# Patient Record
Sex: FEMALE | Race: WHITE | NOT HISPANIC OR LATINO | Employment: OTHER
[De-identification: names, ages, dates, MRNs, and addresses within clinical notes are randomized per-mention and may not be internally consistent; named-entity substitution may affect disease eponyms.]

---

## 2017-05-26 ENCOUNTER — RECORDS - HEALTHEAST (OUTPATIENT)
Dept: ADMINISTRATIVE | Facility: OTHER | Age: 67
End: 2017-05-26

## 2017-05-30 ENCOUNTER — COMMUNICATION - HEALTHEAST (OUTPATIENT)
Dept: SCHEDULING | Facility: CLINIC | Age: 67
End: 2017-05-30

## 2017-05-30 ENCOUNTER — AMBULATORY - HEALTHEAST (OUTPATIENT)
Dept: CARDIOLOGY | Facility: CLINIC | Age: 67
End: 2017-05-30

## 2017-05-30 DIAGNOSIS — I48.91 A-FIB (H): ICD-10-CM

## 2017-05-31 ENCOUNTER — HOSPITAL ENCOUNTER (OUTPATIENT)
Dept: CARDIOLOGY | Facility: CLINIC | Age: 67
Discharge: HOME | End: 2017-05-31
Attending: INTERNAL MEDICINE

## 2017-05-31 DIAGNOSIS — I48.91 A-FIB (H): ICD-10-CM

## 2017-06-06 ENCOUNTER — COMMUNICATION - HEALTHEAST (OUTPATIENT)
Dept: CARDIOLOGY | Facility: CLINIC | Age: 67
End: 2017-06-06

## 2017-06-06 DIAGNOSIS — I48.91 ATRIAL FIBRILLATION WITH RVR (H): ICD-10-CM

## 2017-06-06 DIAGNOSIS — I48.91 ATRIAL FIBRILLATION (H): ICD-10-CM

## 2017-06-08 ENCOUNTER — RECORDS - HEALTHEAST (OUTPATIENT)
Dept: ADMINISTRATIVE | Facility: OTHER | Age: 67
End: 2017-06-08

## 2017-06-16 ENCOUNTER — RECORDS - HEALTHEAST (OUTPATIENT)
Dept: LAB | Facility: CLINIC | Age: 67
End: 2017-06-16

## 2017-06-16 LAB
CHOLEST SERPL-MCNC: 205 MG/DL
FASTING STATUS PATIENT QL REPORTED: ABNORMAL
HDLC SERPL-MCNC: 54 MG/DL
LDLC SERPL CALC-MCNC: 134 MG/DL
TRIGL SERPL-MCNC: 86 MG/DL

## 2017-06-19 ENCOUNTER — OFFICE VISIT - HEALTHEAST (OUTPATIENT)
Dept: CARDIOLOGY | Facility: CLINIC | Age: 67
End: 2017-06-19

## 2017-06-19 DIAGNOSIS — I26.99 OTHER ACUTE PULMONARY EMBOLISM WITHOUT ACUTE COR PULMONALE (H): ICD-10-CM

## 2017-06-19 DIAGNOSIS — I48.19 PERSISTENT ATRIAL FIBRILLATION (H): ICD-10-CM

## 2017-06-19 DIAGNOSIS — I10 ESSENTIAL HYPERTENSION WITH GOAL BLOOD PRESSURE LESS THAN 140/90: ICD-10-CM

## 2017-06-19 DIAGNOSIS — I82.441 ACUTE DVT OF RIGHT TIBIAL VEIN (H): ICD-10-CM

## 2017-06-19 ASSESSMENT — MIFFLIN-ST. JEOR: SCORE: 1115.96

## 2017-06-20 ENCOUNTER — AMBULATORY - HEALTHEAST (OUTPATIENT)
Dept: CARDIOLOGY | Facility: CLINIC | Age: 67
End: 2017-06-20

## 2017-06-22 ENCOUNTER — AMBULATORY - HEALTHEAST (OUTPATIENT)
Dept: CARDIOLOGY | Facility: CLINIC | Age: 67
End: 2017-06-22

## 2017-06-26 ENCOUNTER — ANESTHESIA - HEALTHEAST (OUTPATIENT)
Dept: CARDIOLOGY | Facility: CLINIC | Age: 67
End: 2017-06-26

## 2017-06-28 ENCOUNTER — AMBULATORY - HEALTHEAST (OUTPATIENT)
Dept: CARDIOLOGY | Facility: CLINIC | Age: 67
End: 2017-06-28

## 2017-06-28 DIAGNOSIS — Z79.899 LONG TERM USE OF DRUG: ICD-10-CM

## 2017-06-29 ENCOUNTER — AMBULATORY - HEALTHEAST (OUTPATIENT)
Dept: CARDIOLOGY | Facility: CLINIC | Age: 67
End: 2017-06-29

## 2017-07-07 ENCOUNTER — HOSPITAL ENCOUNTER (OUTPATIENT)
Dept: MAMMOGRAPHY | Facility: CLINIC | Age: 67
Discharge: HOME | End: 2017-07-07
Attending: FAMILY MEDICINE

## 2017-07-07 ENCOUNTER — AMBULATORY - HEALTHEAST (OUTPATIENT)
Dept: CARDIOLOGY | Facility: CLINIC | Age: 67
End: 2017-07-07

## 2017-07-07 DIAGNOSIS — Z12.31 VISIT FOR SCREENING MAMMOGRAM: ICD-10-CM

## 2017-07-10 ENCOUNTER — OFFICE VISIT - HEALTHEAST (OUTPATIENT)
Dept: CARDIOLOGY | Facility: CLINIC | Age: 67
End: 2017-07-10

## 2017-07-10 ENCOUNTER — AMBULATORY - HEALTHEAST (OUTPATIENT)
Dept: CARDIOLOGY | Facility: CLINIC | Age: 67
End: 2017-07-10

## 2017-07-10 DIAGNOSIS — I48.19 PERSISTENT ATRIAL FIBRILLATION (H): ICD-10-CM

## 2017-07-10 DIAGNOSIS — I10 ESSENTIAL HYPERTENSION WITH GOAL BLOOD PRESSURE LESS THAN 140/90: ICD-10-CM

## 2017-07-10 ASSESSMENT — MIFFLIN-ST. JEOR: SCORE: 1093.28

## 2017-07-12 ENCOUNTER — AMBULATORY - HEALTHEAST (OUTPATIENT)
Dept: CARDIOLOGY | Facility: CLINIC | Age: 67
End: 2017-07-12

## 2017-07-21 ENCOUNTER — RECORDS - HEALTHEAST (OUTPATIENT)
Dept: ADMINISTRATIVE | Facility: OTHER | Age: 67
End: 2017-07-21

## 2017-07-31 ENCOUNTER — HOSPITAL ENCOUNTER (OUTPATIENT)
Dept: CARDIOLOGY | Facility: CLINIC | Age: 67
Discharge: HOME | End: 2017-07-31
Attending: INTERNAL MEDICINE | Admitting: INTERNAL MEDICINE

## 2017-07-31 ENCOUNTER — ANESTHESIA - HEALTHEAST (OUTPATIENT)
Dept: CARDIOLOGY | Facility: CLINIC | Age: 67
End: 2017-07-31

## 2017-07-31 DIAGNOSIS — I48.19 PERSISTENT ATRIAL FIBRILLATION (H): ICD-10-CM

## 2017-07-31 LAB
ALT SERPL W P-5'-P-CCNC: 20 U/L (ref 0–45)
ATRIAL RATE - MUSE: 51 BPM
DIASTOLIC BLOOD PRESSURE - MUSE: NORMAL MMHG
INTERPRETATION ECG - MUSE: NORMAL
P AXIS - MUSE: 68 DEGREES
PR INTERVAL - MUSE: 124 MS
QRS DURATION - MUSE: 80 MS
QT - MUSE: 510 MS
QTC - MUSE: 470 MS
R AXIS - MUSE: 89 DEGREES
SYSTOLIC BLOOD PRESSURE - MUSE: NORMAL MMHG
T AXIS - MUSE: 37 DEGREES
VENTRICULAR RATE- MUSE: 51 BPM

## 2017-07-31 ASSESSMENT — MIFFLIN-ST. JEOR: SCORE: 1079.68

## 2017-08-08 ENCOUNTER — AMBULATORY - HEALTHEAST (OUTPATIENT)
Dept: CARDIOLOGY | Facility: CLINIC | Age: 67
End: 2017-08-08

## 2017-08-08 DIAGNOSIS — I48.19 PERSISTENT ATRIAL FIBRILLATION (H): ICD-10-CM

## 2017-08-12 ENCOUNTER — COMMUNICATION - HEALTHEAST (OUTPATIENT)
Dept: CARDIOLOGY | Facility: CLINIC | Age: 67
End: 2017-08-12

## 2017-08-16 ENCOUNTER — AMBULATORY - HEALTHEAST (OUTPATIENT)
Dept: CARDIOLOGY | Facility: CLINIC | Age: 67
End: 2017-08-16

## 2017-08-16 ENCOUNTER — RECORDS - HEALTHEAST (OUTPATIENT)
Dept: ADMINISTRATIVE | Facility: OTHER | Age: 67
End: 2017-08-16

## 2017-08-18 ENCOUNTER — OFFICE VISIT - HEALTHEAST (OUTPATIENT)
Dept: CARDIOLOGY | Facility: CLINIC | Age: 67
End: 2017-08-18

## 2017-08-18 DIAGNOSIS — I26.99 OTHER ACUTE PULMONARY EMBOLISM WITHOUT ACUTE COR PULMONALE (H): ICD-10-CM

## 2017-08-18 DIAGNOSIS — I48.91 ATRIAL FIBRILLATION (H): ICD-10-CM

## 2017-08-18 LAB
ATRIAL RATE - MUSE: 57 BPM
DIASTOLIC BLOOD PRESSURE - MUSE: NORMAL MMHG
INTERPRETATION ECG - MUSE: NORMAL
P AXIS - MUSE: 67 DEGREES
PR INTERVAL - MUSE: 116 MS
QRS DURATION - MUSE: 78 MS
QT - MUSE: 468 MS
QTC - MUSE: 455 MS
R AXIS - MUSE: 79 DEGREES
SYSTOLIC BLOOD PRESSURE - MUSE: NORMAL MMHG
T AXIS - MUSE: 34 DEGREES
VENTRICULAR RATE- MUSE: 57 BPM

## 2017-08-18 ASSESSMENT — MIFFLIN-ST. JEOR: SCORE: 1093.28

## 2017-08-30 ENCOUNTER — HOSPITAL ENCOUNTER (OUTPATIENT)
Dept: CARDIOLOGY | Facility: CLINIC | Age: 67
Discharge: HOME | End: 2017-08-30
Attending: INTERNAL MEDICINE

## 2017-08-30 DIAGNOSIS — I48.91 ATRIAL FIBRILLATION (H): ICD-10-CM

## 2017-08-30 LAB
AORTIC ROOT: 2.9 CM
AORTIC VALVE MEAN VELOCITY: 64.9 CM/S
AV DIMENSIONLESS INDEX VTI: 1
AV MEAN GRADIENT: 2 MMHG
AV PEAK GRADIENT: 4.2 MMHG
AV VALVE AREA: 2.2 CM2
AV VELOCITY RATIO: 0.9
BSA FOR ECHO PROCEDURE: 1.65 M2
CV BLOOD PRESSURE: NORMAL MMHG
CV ECHO HEIGHT: 64 IN
CV ECHO WEIGHT: 133 LBS
DOP CALC AO PEAK VEL: 103 CM/S
DOP CALC AO VTI: 22.8 CM
DOP CALC LVOT AREA: 2.27 CM2
DOP CALC LVOT DIAMETER: 1.7 CM
DOP CALC LVOT PEAK VEL: 90.2 CM/S
DOP CALC LVOT STROKE VOLUME: 49.5 CM3
DOP CALCLVOT PEAK VEL VTI: 21.8 CM
EJECTION FRACTION: 61 % (ref 55–75)
FRACTIONAL SHORTENING: 31.9 % (ref 28–44)
INTERVENTRICULAR SEPTUM IN END DIASTOLE: 1.06 CM (ref 0.6–0.9)
IVS/PW RATIO: 0.8
LA AREA 1: 24.2 CM2
LA AREA 2: 23 CM2
LEFT ATRIUM LENGTH: 5.46 CM
LEFT ATRIUM SIZE: 4.4 CM
LEFT ATRIUM TO AORTIC ROOT RATIO: 1.52 NO UNITS
LEFT ATRIUM VOLUME INDEX: 52.5 ML/M2
LEFT ATRIUM VOLUME: 86.7 CM3
LEFT VENTRICLE CARDIAC INDEX: 1.8 L/MIN/M2
LEFT VENTRICLE CARDIAC OUTPUT: 2.9 L/MIN
LEFT VENTRICLE DIASTOLIC VOLUME INDEX: 35.8 CM3/M2 (ref 34–74)
LEFT VENTRICLE DIASTOLIC VOLUME: 59 CM3 (ref 46–106)
LEFT VENTRICLE HEART RATE: 59 BPM
LEFT VENTRICLE MASS INDEX: 93 G/M2
LEFT VENTRICLE SYSTOLIC VOLUME INDEX: 13.9 CM3/M2 (ref 11–31)
LEFT VENTRICLE SYSTOLIC VOLUME: 23 CM3 (ref 14–42)
LEFT VENTRICULAR INTERNAL DIMENSION IN DIASTOLE: 3.95 CM (ref 3.8–5.2)
LEFT VENTRICULAR INTERNAL DIMENSION IN SYSTOLE: 2.69 CM (ref 2.2–3.5)
LEFT VENTRICULAR MASS: 153.4 G
LEFT VENTRICULAR OUTFLOW TRACT MEAN GRADIENT: 2 MMHG
LEFT VENTRICULAR OUTFLOW TRACT MEAN VELOCITY: 58.6 CM/S
LEFT VENTRICULAR OUTFLOW TRACT PEAK GRADIENT: 3 MMHG
LEFT VENTRICULAR POSTERIOR WALL IN END DIASTOLE: 1.25 CM (ref 0.6–0.9)
LV STROKE VOLUME INDEX: 30 ML/M2
MITRAL VALVE E/A RATIO: 1.4
MV AVERAGE E/E' RATIO: 8.2 CM/S
MV DECELERATION TIME: 113 MS
MV E'TISSUE VEL-LAT: 8.38 CM/S
MV E'TISSUE VEL-MED: 7.99 CM/S
MV LATERAL E/E' RATIO: 8
MV MEDIAL E/E' RATIO: 8.4
MV PEAK A VELOCITY: 46.4 CM/S
MV PEAK E VELOCITY: 67.1 CM/S
NUC REST DIASTOLIC VOLUME INDEX: 2128 LBS
NUC REST SYSTOLIC VOLUME INDEX: 64 IN
RIGHT VENTRICULAR INTERNAL DIMENSION IN DYSTOLE: 2.3 CM
TRICUSPID VALVE ANULAR PLANE SYSTOLIC EXCURSION: 2.4 CM

## 2017-08-30 ASSESSMENT — MIFFLIN-ST. JEOR: SCORE: 1093.28

## 2017-09-05 ENCOUNTER — COMMUNICATION - HEALTHEAST (OUTPATIENT)
Dept: CARDIOLOGY | Facility: CLINIC | Age: 67
End: 2017-09-05

## 2017-09-05 DIAGNOSIS — I48.19 PERSISTENT ATRIAL FIBRILLATION (H): ICD-10-CM

## 2017-09-07 ENCOUNTER — AMBULATORY - HEALTHEAST (OUTPATIENT)
Dept: CARDIOLOGY | Facility: CLINIC | Age: 67
End: 2017-09-07

## 2017-09-15 ENCOUNTER — COMMUNICATION - HEALTHEAST (OUTPATIENT)
Dept: CARDIOLOGY | Facility: CLINIC | Age: 67
End: 2017-09-15

## 2017-09-27 ENCOUNTER — AMBULATORY - HEALTHEAST (OUTPATIENT)
Dept: CARDIOLOGY | Facility: CLINIC | Age: 67
End: 2017-09-27

## 2017-09-27 ENCOUNTER — OFFICE VISIT - HEALTHEAST (OUTPATIENT)
Dept: CARDIOLOGY | Facility: CLINIC | Age: 67
End: 2017-09-27

## 2017-09-27 DIAGNOSIS — I48.19 PERSISTENT ATRIAL FIBRILLATION (H): ICD-10-CM

## 2017-09-27 DIAGNOSIS — I48.91 ATRIAL FIBRILLATION (H): ICD-10-CM

## 2017-09-27 LAB
ALT SERPL W P-5'-P-CCNC: 22 U/L (ref 0–45)
AST SERPL W P-5'-P-CCNC: 30 U/L (ref 0–40)

## 2017-09-27 ASSESSMENT — MIFFLIN-ST. JEOR: SCORE: 1119.14

## 2017-09-29 ENCOUNTER — COMMUNICATION - HEALTHEAST (OUTPATIENT)
Dept: CARDIOLOGY | Facility: CLINIC | Age: 67
End: 2017-09-29

## 2017-10-02 ENCOUNTER — AMBULATORY - HEALTHEAST (OUTPATIENT)
Dept: CARDIOLOGY | Facility: CLINIC | Age: 67
End: 2017-10-02

## 2017-10-02 ENCOUNTER — SURGERY - HEALTHEAST (OUTPATIENT)
Dept: CARDIOLOGY | Facility: CLINIC | Age: 67
End: 2017-10-02

## 2017-10-02 DIAGNOSIS — I48.19 PERSISTENT ATRIAL FIBRILLATION (H): ICD-10-CM

## 2017-10-31 ENCOUNTER — AMBULATORY - HEALTHEAST (OUTPATIENT)
Dept: CARDIOLOGY | Facility: CLINIC | Age: 67
End: 2017-10-31

## 2017-12-04 ENCOUNTER — COMMUNICATION - HEALTHEAST (OUTPATIENT)
Dept: CARDIOLOGY | Facility: CLINIC | Age: 67
End: 2017-12-04

## 2017-12-05 ENCOUNTER — AMBULATORY - HEALTHEAST (OUTPATIENT)
Dept: CARDIOLOGY | Facility: CLINIC | Age: 67
End: 2017-12-05

## 2017-12-05 DIAGNOSIS — Z79.899 LONG TERM USE OF DRUG: ICD-10-CM

## 2017-12-15 ENCOUNTER — RECORDS - HEALTHEAST (OUTPATIENT)
Dept: ADMINISTRATIVE | Facility: OTHER | Age: 67
End: 2017-12-15

## 2018-01-02 ENCOUNTER — AMBULATORY - HEALTHEAST (OUTPATIENT)
Dept: CARDIOLOGY | Facility: CLINIC | Age: 68
End: 2018-01-02

## 2018-01-02 ENCOUNTER — RECORDS - HEALTHEAST (OUTPATIENT)
Dept: ADMINISTRATIVE | Facility: OTHER | Age: 68
End: 2018-01-02

## 2018-01-03 ENCOUNTER — OFFICE VISIT - HEALTHEAST (OUTPATIENT)
Dept: CARDIOLOGY | Facility: CLINIC | Age: 68
End: 2018-01-03

## 2018-01-03 DIAGNOSIS — I10 ESSENTIAL HYPERTENSION: ICD-10-CM

## 2018-01-03 DIAGNOSIS — I48.19 PERSISTENT ATRIAL FIBRILLATION (H): ICD-10-CM

## 2018-01-03 DIAGNOSIS — Z79.899 LONG TERM USE OF DRUG: ICD-10-CM

## 2018-01-03 LAB
ALT SERPL W P-5'-P-CCNC: 23 U/L (ref 0–45)
ANION GAP SERPL CALCULATED.3IONS-SCNC: 7 MMOL/L (ref 5–18)
BUN SERPL-MCNC: 23 MG/DL (ref 8–22)
CALCIUM SERPL-MCNC: 9.5 MG/DL (ref 8.5–10.5)
CHLORIDE BLD-SCNC: 102 MMOL/L (ref 98–107)
CO2 SERPL-SCNC: 29 MMOL/L (ref 22–31)
CREAT SERPL-MCNC: 0.95 MG/DL (ref 0.6–1.1)
ERYTHROCYTE [DISTWIDTH] IN BLOOD BY AUTOMATED COUNT: 12.7 % (ref 11–14.5)
GFR SERPL CREATININE-BSD FRML MDRD: 58 ML/MIN/1.73M2
GLUCOSE BLD-MCNC: 87 MG/DL (ref 70–125)
HCT VFR BLD AUTO: 46.5 % (ref 35–47)
HGB BLD-MCNC: 15.5 G/DL (ref 12–16)
MCH RBC QN AUTO: 31.6 PG (ref 27–34)
MCHC RBC AUTO-ENTMCNC: 33.3 G/DL (ref 32–36)
MCV RBC AUTO: 95 FL (ref 80–100)
PLATELET # BLD AUTO: 264 THOU/UL (ref 140–440)
PMV BLD AUTO: 9.6 FL (ref 8.5–12.5)
POTASSIUM BLD-SCNC: 4.5 MMOL/L (ref 3.5–5)
RBC # BLD AUTO: 4.91 MILL/UL (ref 3.8–5.4)
SODIUM SERPL-SCNC: 138 MMOL/L (ref 136–145)
TSH SERPL DL<=0.005 MIU/L-ACNC: 2.76 UIU/ML (ref 0.3–5)
WBC: 5.7 THOU/UL (ref 4–11)

## 2018-01-03 ASSESSMENT — MIFFLIN-ST. JEOR: SCORE: 1145.9

## 2018-01-04 LAB
ATRIAL RATE - MUSE: 59 BPM
DIASTOLIC BLOOD PRESSURE - MUSE: NORMAL MMHG
INTERPRETATION ECG - MUSE: NORMAL
P AXIS - MUSE: 72 DEGREES
PR INTERVAL - MUSE: 124 MS
QRS DURATION - MUSE: 78 MS
QT - MUSE: 464 MS
QTC - MUSE: 459 MS
R AXIS - MUSE: 66 DEGREES
SYSTOLIC BLOOD PRESSURE - MUSE: NORMAL MMHG
T AXIS - MUSE: 46 DEGREES
VENTRICULAR RATE- MUSE: 59 BPM

## 2018-01-08 ENCOUNTER — AMBULATORY - HEALTHEAST (OUTPATIENT)
Dept: CARDIOLOGY | Facility: CLINIC | Age: 68
End: 2018-01-08

## 2018-01-08 ENCOUNTER — ANESTHESIA - HEALTHEAST (OUTPATIENT)
Dept: CARDIOLOGY | Facility: CLINIC | Age: 68
End: 2018-01-08

## 2018-01-09 ENCOUNTER — SURGERY - HEALTHEAST (OUTPATIENT)
Dept: CARDIOLOGY | Facility: CLINIC | Age: 68
End: 2018-01-09

## 2018-01-09 ASSESSMENT — MIFFLIN-ST. JEOR: SCORE: 1131.39

## 2018-01-10 ASSESSMENT — MIFFLIN-ST. JEOR: SCORE: 1130.48

## 2018-01-11 ENCOUNTER — RECORDS - HEALTHEAST (OUTPATIENT)
Dept: ADMINISTRATIVE | Facility: OTHER | Age: 68
End: 2018-01-11

## 2018-01-12 ENCOUNTER — AMBULATORY - HEALTHEAST (OUTPATIENT)
Dept: CARDIOLOGY | Facility: CLINIC | Age: 68
End: 2018-01-12

## 2018-01-12 DIAGNOSIS — Z98.890 STATUS POST CIRCUMFERENTIAL ABLATION OF PULMONARY VEIN: ICD-10-CM

## 2018-01-12 ASSESSMENT — MIFFLIN-ST. JEOR: SCORE: 1143.18

## 2018-01-18 ENCOUNTER — AMBULATORY - HEALTHEAST (OUTPATIENT)
Dept: CARDIOLOGY | Facility: CLINIC | Age: 68
End: 2018-01-18

## 2018-01-18 DIAGNOSIS — I48.91 ATRIAL FIBRILLATION (H): ICD-10-CM

## 2018-02-07 ENCOUNTER — OFFICE VISIT - HEALTHEAST (OUTPATIENT)
Dept: CARDIOLOGY | Facility: CLINIC | Age: 68
End: 2018-02-07

## 2018-02-07 DIAGNOSIS — I10 ESSENTIAL HYPERTENSION: ICD-10-CM

## 2018-02-07 DIAGNOSIS — I48.19 PERSISTENT ATRIAL FIBRILLATION (H): ICD-10-CM

## 2018-02-07 ASSESSMENT — MIFFLIN-ST. JEOR: SCORE: 1152.25

## 2018-03-06 ENCOUNTER — HOSPITAL ENCOUNTER (OUTPATIENT)
Dept: CARDIOLOGY | Facility: CLINIC | Age: 68
Discharge: HOME | End: 2018-03-06
Attending: NURSE PRACTITIONER

## 2018-03-06 DIAGNOSIS — I48.19 PERSISTENT ATRIAL FIBRILLATION (H): ICD-10-CM

## 2018-03-21 ENCOUNTER — HOSPITAL ENCOUNTER (OUTPATIENT)
Dept: CT IMAGING | Facility: CLINIC | Age: 68
Discharge: HOME | End: 2018-03-21
Attending: INTERNAL MEDICINE

## 2018-03-21 DIAGNOSIS — I48.91 ATRIAL FIBRILLATION (H): ICD-10-CM

## 2018-03-21 LAB
BSA FOR ECHO PROCEDURE: 1.73 M2
CREAT BLD-MCNC: 1 MG/DL
POC GFR AMER AF HE - HISTORICAL: >60 ML/MIN/1.73M2
POC GFR NON AMER AF HE - HISTORICAL: 55 ML/MIN/1.73M2

## 2018-03-21 ASSESSMENT — MIFFLIN-ST. JEOR: SCORE: 1152.25

## 2018-03-27 ENCOUNTER — OFFICE VISIT - HEALTHEAST (OUTPATIENT)
Dept: CARDIOLOGY | Facility: CLINIC | Age: 68
End: 2018-03-27

## 2018-03-27 DIAGNOSIS — I48.19 PERSISTENT ATRIAL FIBRILLATION (H): ICD-10-CM

## 2018-03-27 DIAGNOSIS — I49.9 ARRHYTHMIA: ICD-10-CM

## 2018-03-27 ASSESSMENT — MIFFLIN-ST. JEOR: SCORE: 1160.42

## 2018-03-28 LAB
ATRIAL RATE - MUSE: 68 BPM
DIASTOLIC BLOOD PRESSURE - MUSE: NORMAL MMHG
INTERPRETATION ECG - MUSE: NORMAL
P AXIS - MUSE: 53 DEGREES
PR INTERVAL - MUSE: 116 MS
QRS DURATION - MUSE: 88 MS
QT - MUSE: 428 MS
QTC - MUSE: 455 MS
R AXIS - MUSE: 51 DEGREES
SYSTOLIC BLOOD PRESSURE - MUSE: NORMAL MMHG
T AXIS - MUSE: 38 DEGREES
VENTRICULAR RATE- MUSE: 68 BPM

## 2018-04-17 ENCOUNTER — RECORDS - HEALTHEAST (OUTPATIENT)
Dept: LAB | Facility: CLINIC | Age: 68
End: 2018-04-17

## 2018-04-17 LAB — IRON SERPL-MCNC: 86 UG/DL (ref 42–175)

## 2018-04-18 LAB — HCV AB SERPL QL IA: NEGATIVE

## 2018-06-05 ENCOUNTER — COMMUNICATION - HEALTHEAST (OUTPATIENT)
Dept: ADMINISTRATIVE | Facility: CLINIC | Age: 68
End: 2018-06-05

## 2018-07-11 ENCOUNTER — HOSPITAL ENCOUNTER (OUTPATIENT)
Dept: MAMMOGRAPHY | Facility: CLINIC | Age: 68
Discharge: HOME | End: 2018-07-11
Attending: FAMILY MEDICINE

## 2018-07-11 DIAGNOSIS — Z12.31 VISIT FOR SCREENING MAMMOGRAM: ICD-10-CM

## 2018-07-11 LAB — MAMMOGRAM: NORMAL

## 2018-07-25 ENCOUNTER — OFFICE VISIT - HEALTHEAST (OUTPATIENT)
Dept: CARDIOLOGY | Facility: CLINIC | Age: 68
End: 2018-07-25

## 2018-07-25 DIAGNOSIS — I27.82 OTHER CHRONIC PULMONARY EMBOLISM WITHOUT ACUTE COR PULMONALE (H): ICD-10-CM

## 2018-07-25 DIAGNOSIS — I48.0 PAROXYSMAL ATRIAL FIBRILLATION (H): ICD-10-CM

## 2018-07-25 ASSESSMENT — MIFFLIN-ST. JEOR: SCORE: 1134.11

## 2018-08-09 ENCOUNTER — HOSPITAL ENCOUNTER (OUTPATIENT)
Dept: CARDIOLOGY | Facility: CLINIC | Age: 68
Discharge: HOME | End: 2018-08-09
Attending: INTERNAL MEDICINE

## 2018-08-09 DIAGNOSIS — I48.0 PAROXYSMAL ATRIAL FIBRILLATION (H): ICD-10-CM

## 2018-08-09 ASSESSMENT — MIFFLIN-ST. JEOR: SCORE: 1134.11

## 2018-08-10 LAB
AORTIC ROOT: 3 CM
BSA FOR ECHO PROCEDURE: 1.7 M2
CV BLOOD PRESSURE: NORMAL MMHG
CV ECHO HEIGHT: 64 IN
CV ECHO WEIGHT: 142 LBS
DOP CALC LVOT AREA: 2.83 CM2
DOP CALC LVOT DIAMETER: 1.9 CM
DOP CALC LVOT PEAK VEL: 88.7 CM/S
DOP CALC LVOT STROKE VOLUME: 58.1 CM3
DOP CALCLVOT PEAK VEL VTI: 20.5 CM
EJECTION FRACTION: 61 % (ref 55–75)
FRACTIONAL SHORTENING: 34.8 % (ref 28–44)
INTERVENTRICULAR SEPTUM IN END DIASTOLE: 0.7 CM (ref 0.6–0.9)
IVS/PW RATIO: 1
LA AREA 1: 18.4 CM2
LA AREA 2: 16.1 CM2
LEFT ATRIUM LENGTH: 4.87 CM
LEFT ATRIUM SIZE: 4.1 CM
LEFT ATRIUM TO AORTIC ROOT RATIO: 1.37 NO UNITS
LEFT ATRIUM VOLUME INDEX: 30.4 ML/M2
LEFT ATRIUM VOLUME: 51.7 ML
LEFT VENTRICLE CARDIAC INDEX: 2.2 L/MIN/M2
LEFT VENTRICLE CARDIAC OUTPUT: 3.7 L/MIN
LEFT VENTRICLE DIASTOLIC VOLUME INDEX: 25.9 CM3/M2 (ref 34–74)
LEFT VENTRICLE DIASTOLIC VOLUME: 44 CM3 (ref 46–106)
LEFT VENTRICLE HEART RATE: 63 BPM
LEFT VENTRICLE MASS INDEX: 58.8 G/M2
LEFT VENTRICLE SYSTOLIC VOLUME INDEX: 10 CM3/M2 (ref 11–31)
LEFT VENTRICLE SYSTOLIC VOLUME: 17 CM3 (ref 14–42)
LEFT VENTRICULAR INTERNAL DIMENSION IN DIASTOLE: 4.65 CM (ref 3.8–5.2)
LEFT VENTRICULAR INTERNAL DIMENSION IN SYSTOLE: 3.03 CM (ref 2.2–3.5)
LEFT VENTRICULAR MASS: 100 G
LEFT VENTRICULAR OUTFLOW TRACT MEAN GRADIENT: 2 MMHG
LEFT VENTRICULAR OUTFLOW TRACT MEAN VELOCITY: 64.9 CM/S
LEFT VENTRICULAR OUTFLOW TRACT PEAK GRADIENT: 3 MMHG
LEFT VENTRICULAR POSTERIOR WALL IN END DIASTOLE: 0.69 CM (ref 0.6–0.9)
LV STROKE VOLUME INDEX: 34.2 ML/M2
MITRAL VALVE DECELERATION SLOPE: 4410 MM/S2
MITRAL VALVE E/A RATIO: 1.6
MITRAL VALVE PRESSURE HALF-TIME: 68 MS
MV AVERAGE E/E' RATIO: 10.8 CM/S
MV DECELERATION TIME: 180 MS
MV E'TISSUE VEL-LAT: 9.16 CM/S
MV E'TISSUE VEL-MED: 7.41 CM/S
MV LATERAL E/E' RATIO: 9.7
MV MEDIAL E/E' RATIO: 12.1
MV PEAK A VELOCITY: 55.3 CM/S
MV PEAK E VELOCITY: 89.3 CM/S
MV VALVE AREA PRESSURE 1/2 METHOD: 3.2 CM2
NUC REST DIASTOLIC VOLUME INDEX: 2272 LBS
NUC REST SYSTOLIC VOLUME INDEX: 64 IN
TRICUSPID VALVE ANULAR PLANE SYSTOLIC EXCURSION: 2.1 CM

## 2018-08-12 ENCOUNTER — COMMUNICATION - HEALTHEAST (OUTPATIENT)
Dept: CARDIOLOGY | Facility: CLINIC | Age: 68
End: 2018-08-12

## 2018-09-12 ENCOUNTER — OFFICE VISIT - HEALTHEAST (OUTPATIENT)
Dept: CARDIOLOGY | Facility: CLINIC | Age: 68
End: 2018-09-12

## 2018-09-12 DIAGNOSIS — I10 ESSENTIAL HYPERTENSION: ICD-10-CM

## 2018-09-12 DIAGNOSIS — I48.19 PERSISTENT ATRIAL FIBRILLATION (H): ICD-10-CM

## 2018-09-12 LAB
ATRIAL RATE - MUSE: 61 BPM
DIASTOLIC BLOOD PRESSURE - MUSE: NORMAL MMHG
INTERPRETATION ECG - MUSE: NORMAL
P AXIS - MUSE: 70 DEGREES
PR INTERVAL - MUSE: 106 MS
QRS DURATION - MUSE: 78 MS
QT - MUSE: 432 MS
QTC - MUSE: 434 MS
R AXIS - MUSE: 71 DEGREES
SYSTOLIC BLOOD PRESSURE - MUSE: NORMAL MMHG
T AXIS - MUSE: 45 DEGREES
VENTRICULAR RATE- MUSE: 61 BPM

## 2018-09-12 ASSESSMENT — MIFFLIN-ST. JEOR: SCORE: 1133.2

## 2018-12-19 ENCOUNTER — COMMUNICATION - HEALTHEAST (OUTPATIENT)
Dept: CARDIOLOGY | Facility: CLINIC | Age: 68
End: 2018-12-19

## 2018-12-19 DIAGNOSIS — I48.19 PERSISTENT ATRIAL FIBRILLATION (H): ICD-10-CM

## 2018-12-27 ENCOUNTER — OFFICE VISIT - HEALTHEAST (OUTPATIENT)
Dept: CARDIOLOGY | Facility: CLINIC | Age: 68
End: 2018-12-27

## 2018-12-27 DIAGNOSIS — I10 ESSENTIAL HYPERTENSION: ICD-10-CM

## 2018-12-27 DIAGNOSIS — I48.19 PERSISTENT ATRIAL FIBRILLATION (H): ICD-10-CM

## 2018-12-27 ASSESSMENT — MIFFLIN-ST. JEOR: SCORE: 1141.82

## 2019-02-05 ENCOUNTER — COMMUNICATION - HEALTHEAST (OUTPATIENT)
Dept: CARDIOLOGY | Facility: CLINIC | Age: 69
End: 2019-02-05

## 2019-02-05 DIAGNOSIS — I48.19 PERSISTENT ATRIAL FIBRILLATION (H): ICD-10-CM

## 2019-02-06 ENCOUNTER — COMMUNICATION - HEALTHEAST (OUTPATIENT)
Dept: CARDIOLOGY | Facility: CLINIC | Age: 69
End: 2019-02-06

## 2019-02-06 DIAGNOSIS — I48.19 PERSISTENT ATRIAL FIBRILLATION (H): ICD-10-CM

## 2019-04-19 ENCOUNTER — RECORDS - HEALTHEAST (OUTPATIENT)
Dept: ADMINISTRATIVE | Facility: OTHER | Age: 69
End: 2019-04-19

## 2019-04-23 ENCOUNTER — RECORDS - HEALTHEAST (OUTPATIENT)
Dept: LAB | Facility: CLINIC | Age: 69
End: 2019-04-23

## 2019-04-23 LAB
ANION GAP SERPL CALCULATED.3IONS-SCNC: 7 MMOL/L (ref 5–18)
BUN SERPL-MCNC: 13 MG/DL (ref 8–28)
CALCIUM SERPL-MCNC: 9.6 MG/DL (ref 8.5–10.5)
CHLORIDE BLD-SCNC: 106 MMOL/L (ref 98–107)
CHOLEST SERPL-MCNC: 250 MG/DL
CO2 SERPL-SCNC: 29 MMOL/L (ref 22–31)
CREAT SERPL-MCNC: 0.78 MG/DL (ref 0.6–1.1)
FASTING STATUS PATIENT QL REPORTED: YES
GFR SERPL CREATININE-BSD FRML MDRD: >60 ML/MIN/1.73M2
GLUCOSE BLD-MCNC: 72 MG/DL (ref 70–125)
HDLC SERPL-MCNC: 64 MG/DL
LDLC SERPL CALC-MCNC: 172 MG/DL
POTASSIUM BLD-SCNC: 4.1 MMOL/L (ref 3.5–5)
SODIUM SERPL-SCNC: 142 MMOL/L (ref 136–145)
TRIGL SERPL-MCNC: 68 MG/DL

## 2019-04-24 LAB — 25(OH)D3 SERPL-MCNC: 38.6 NG/ML (ref 30–80)

## 2019-04-25 ENCOUNTER — AMBULATORY - HEALTHEAST (OUTPATIENT)
Dept: CARDIOLOGY | Facility: CLINIC | Age: 69
End: 2019-04-25

## 2019-05-02 ENCOUNTER — RECORDS - HEALTHEAST (OUTPATIENT)
Dept: ADMINISTRATIVE | Facility: OTHER | Age: 69
End: 2019-05-02

## 2019-05-06 ENCOUNTER — OFFICE VISIT - HEALTHEAST (OUTPATIENT)
Dept: CARDIOLOGY | Facility: CLINIC | Age: 69
End: 2019-05-06

## 2019-05-06 DIAGNOSIS — E78.00 ELEVATED LDL CHOLESTEROL LEVEL: ICD-10-CM

## 2019-05-06 DIAGNOSIS — I48.19 PERSISTENT ATRIAL FIBRILLATION (H): ICD-10-CM

## 2019-05-06 ASSESSMENT — MIFFLIN-ST. JEOR: SCORE: 1102.36

## 2019-05-08 NOTE — TELEPHONE ENCOUNTER
FUTURE VISIT INFORMATION      FUTURE VISIT INFORMATION:    Date: 8/6/19     Time: 10:30am    Location: [facility] ENT  REFERRAL INFORMATION:    Referring provider:  Dr. Edi Cyr    Referring providers clinic:  [facility]    Reason for visit/diagnosis:  Evaluation for botox injection in larynx.     RECORDS REQUESTED FROM:       Clinic name Comments Records Status Imaging Status   [facility] - ENT 4/30/19 - Referral - Dr. Edi Cyr  4/30/19, 4/26/19 - Speech Therapy - Office Visit - ROSS Hathaway  10/13/17 - Office Visit / Speech Therapy Consultation - Dr. Kaushik Garcia, Jenny Urberg-Mendez, SLP Care Everywhere     Speech Therapy Speech Therapy-7/18/19-4/30/19 Care Everywhere                              5/8/19 3:04pm - Called pt and LVM inquiring about outside recs.  PP  7/18/19-1:45 pm- Pt called back and said she has not been seen anywhere else. Can be reached at [phone].  PB

## 2019-05-31 ENCOUNTER — RECORDS - HEALTHEAST (OUTPATIENT)
Dept: ADMINISTRATIVE | Facility: OTHER | Age: 69
End: 2019-05-31

## 2019-06-18 ENCOUNTER — DOCUMENTATION ONLY (OUTPATIENT)
Dept: CARE COORDINATION | Facility: CLINIC | Age: 69
End: 2019-06-18

## 2019-08-04 ENCOUNTER — COMMUNICATION - HEALTHEAST (OUTPATIENT)
Dept: CARDIOLOGY | Facility: CLINIC | Age: 69
End: 2019-08-04

## 2019-08-04 DIAGNOSIS — I48.19 PERSISTENT ATRIAL FIBRILLATION (H): ICD-10-CM

## 2019-08-06 ENCOUNTER — OFFICE VISIT (OUTPATIENT)
Dept: OTOLARYNGOLOGY | Facility: CLINIC | Age: 69
End: 2019-08-06
Payer: MEDICARE

## 2019-08-06 ENCOUNTER — PRE VISIT (OUTPATIENT)
Dept: OTOLARYNGOLOGY | Facility: CLINIC | Age: 69
End: 2019-08-06

## 2019-08-06 VITALS — WEIGHT: 135 LBS | BODY MASS INDEX: 23.05 KG/M2 | HEIGHT: 64 IN

## 2019-08-06 DIAGNOSIS — R49.0 DYSPHONIA: Primary | ICD-10-CM

## 2019-08-06 DIAGNOSIS — J38.3 OTHER DISEASES OF VOCAL CORDS: ICD-10-CM

## 2019-08-06 DIAGNOSIS — R25.1 TREMOR: ICD-10-CM

## 2019-08-06 RX ORDER — METOPROLOL SUCCINATE 25 MG/1
TABLET, EXTENDED RELEASE ORAL
COMMUNITY
Start: 2019-08-05

## 2019-08-06 RX ORDER — CLONAZEPAM 0.5 MG/1
TABLET ORAL
COMMUNITY
Start: 2019-04-16 | End: 2021-02-15

## 2019-08-06 RX ORDER — APIXABAN 5 MG/1
5 TABLET, FILM COATED ORAL 2 TIMES DAILY
COMMUNITY
Start: 2019-07-17

## 2019-08-06 ASSESSMENT — MIFFLIN-ST. JEOR: SCORE: 1112.36

## 2019-08-06 NOTE — PROGRESS NOTES
[facility] VOICE [facility]INIC  Jose D Pugh Jr., M.D., F.A.C.S.  Lili Fraga M.D., M.P.H.  Radha Barragan, Ph.D., CCC/SLP  Aida Matthew M.M. (voice), M.A., CCC/SLP  Butch Saavedra M.M. (voice), M.A., CCC/SLP    Evaluation report    Clinician: Aida Matthew M.M. (voice), M.A., CCC/SLP  Seen in conjunction with: Dr. Pugh  Referring physician:  [place]  Patient: Florida Nava  Date of Visit: 8/6/2019    HISTORY  Chief complaint: [place] Nava is a 69 year old presenting today for evaluation of dysphonia.  Salient history: She has a history significant for benign essential tremor.  In 2017,  She was seen by Dr. Garcia for dysphonia in the context of a tremor and voice disturbance that has been present for approximately 40 years.  In 1996 she was evaluated by Dr. Pugh at [facility] [place] [place].  His reports note that her tremor was much more mild in 1996, and he felt that Botox would not be an effective treatment in order to improve her voice quality at that time.  She has since pursued courses of speech therapy with Mavis Oviedo (April-August 2019), and Jenny Rodríguez.  However, she returns for evaluation today to determine if she may be a candidate for a trial course of Botox treatment.    CURRENT SYMPTOMS INCLUDE  VOICE    She describes her voice as having voice breaks, and shaky/unsteady voice quality.    She reports that her tremor began in her 20s, and reports that her mother developed a tremor at an older age.    THROAT ISSUES    Denies issues with cough, throat clearing, globus sensation.    SWALLOWING    Denies any history of swallowing issues.    RESPIRATION    Denies any breathing issues.    OTHER PERTINENT HISTORY    Please also refer to Dr. Pugh's dictation.     No past medical history on file.  No past surgical history on file.    OBJECTIVE  PATIENT REPORTED MEASURES    Effort to talk: 7 / 10 (0-10 in which 10 represents maximal effort)    Effort to sing: n/a / 10 (0-10 in which 10 represents maximal  "effort)    Voice quality: 2 / 10 (0-10 in which 10 represents best possible voice)  Patient Supplied Answers To VHI Questionnaire  Voice Handicap Index (VHI-10) 8/5/2019   My voice makes it difficult for people to hear me 2   People have difficulty understanding me in a noisy room 3   My voice difficulties restrict my personal and social life.  3   I feel left out of conversations because of my voice 2   My voice problem causes me to lose income 0   I feel as though I have to strain to produce voice 2   The clarity of my voice is unpredictable 3   My voice problem upsets me 4   My voice makes me feel handicapped 4   People ask, \"What's wrong with your voice?\" 2   VHI-10 25       Patient Supplied Answers To CSI Questionnaire  No flowsheet data found.    Patient Supplied Answers To EAT Questionnaire  Eating Assessment Tool (EAT-10) 8/5/2019   My swallowing problem has caused me to lose weight 0   My swallowing problem interferes with my ability to go out for meals 0   Swallowing liquids takes extra effort 0   Swallowing solids takes extra effort 0   Swallowing pills takes extra effort 0   Swallowing is painful 0   The pleasure of eating is affected by my swallowing 0   When I swallow food sticks in my throat 0   I cough when I eat 0   Swallowing is stressful 0   EAT-10 0       PERCEPTUAL EVALUATION (CPT 75313)  POSTURE / TENSION:     upper body    BREATHING:     appears within normal limits and adequate     LARYNGEAL PALPATION: n/a    VOICE:    Roughness: Mild intermittent    Breathiness: minimal    Strain: WNL    3 to 5 Hz tremor     Intermittent glottal virk    loudness    Conversational speech:  Mild to moderately reduced    Projected speech:  WNL    Pitch:    Conversational speech:  WNL    Pitch glide: does not demonstrate any SLN weakness    Resonance:    Conversational speech:  laryngeal pharyngeal resonance    Singing vs. Speech:  n/a    CAPE-V Overall Severity:  55/100    SPEECH    Mild lisp noted especially " "with /s/    COUGH/THROAT CLEARING:    Not observed    LARYNGEAL FUNCTION STUDIES (CPT 56503)  Fundamental frequency Metrics     /a/ mean F0 = 176 Hz (SD = 14.13 Hz)     /i/ mean F0 = 168 Hz (SD = 20.57 Hz)     [place] Mean f0 = 168 Hz (SD 24.59 Hz)     [place]:         Min F0 = 149 Hz        Max F0 = 665 Hz        Range = 516 Hz        Notes = limited upper range    Cepstral Measures     CPPS /a/ = 20.75 dB     CPPS /i/ = 23.17 dB     CPPS \"all voiced\" = 19.61 dB     AVQI (v.3.01) = 3.19    Additional Measures     Harmonic to Noise Ratio /a/ = 19.19 dB     Harmonic to Noise Ratio: [place] passage = 15.03 dB     Jitter (local) /a/ = 0.890 %     Shimmer (local) /a/ = 2.634 %    Aerodynamic Measures     Protocol / Parameter Result Normative Mean (SD)   Vital Capacity     Expiratory Volume 1.53 Liters 2.24 (0.6) Liters   Comfortable Sustained Phonation     Mean SPL 74.66 dB 79.89 (5.47) dB   Mean Pitch 204.84 Hz 185.27 (25.93) Hz   Peak Expiratory Airflow 0.718 Lit/Sec 0.17 (0.09) Lit/Sec   Mean Expiratory Airflow 0.248 Lit/Sec 0.1 (0.06) Lit/Sec   Voicing Efficiency     Peak Air Pressure 7.58 cm H2O 9.7 (5.12) cm H2O   Mean Peak Air Pressure 6.56 cm H2O 7.95 (4.28) cm H2O   Peak Expiratory Airflow 0.429 Lit/Sec 0.25 (0.19) Lit/Sec   Mean Airflow During Voicing 0.187 Lit/Sec 0.13 (0.07) Lit/Sec   Running Speech: All Voiced Stimulus     Mean SPL 62.1 dB    Mean Pitch 165.82 Hz    Peak Expiratory Airflow 0.553 Lit/Sec    Mean Expiratory Airflow 0.197 Lit/Sec    Mean Airflow During Voicing 0.164 Lit/Sec    Running Speech: [place] Pass[place]     Mean SPL 61.36 dB    SPL Range 45.18 dB    Mean Pitch 175.94 Hz    Pitch Range 217.72 Hz    Peak Expiratory Airflow 1.147 Lit/Sec    Mean Expiratory Airflow 0.16 Lit/Sec    Expiratory Volume 3.55 Liters    Mean Airflow During Voicing 0.152 Lit/Sec    Peak Inspiratory Airflow -1.992 Lit/Sec    Inspiratory Volume -3.3 Liters        LARYNGEAL EXAMINATION  Procedure: Flexible " endoscopy with chip-tip technology without stroboscopy, right nostril; topical anesthesia with 3% Lidocaine and 0.25% phenylephrine was applied.   Performed by: Dr. Pugh   The laryngeal and pharyngeal structures were evaluated for gross appearance, mobility, function, and focal lesions / abnormalities of the associated mucosa.    All findings were within normal limits with the exception of the following salient features:     Mild collection of dry secretions throughout the pharynx    Moderate supraglottic hyperfunction during connected speech tasks    3 to 5 Hz tremor involving the vocal folds and a horizontal motion, especially observed during sustained phonation    THERAPY PROBES: mild improvement was elicited with use of forward resonant stimuli and use of inhalation phonation       Abducted view of the vocal folds    The laryngeal exam was reviewed with Ms. Nava, and I provided pertinent explanations, as well as written and oral information.    ASSESSMENT / PLAN  IMPRESSIONS: Florida Nava is a 69-year-old female, presenting today with R49.0 (Dysphonia) in the context of R25.1 (Tremor), as evidenced by evaluation, the results of the laryngeal function studies, as well as the laryngeal exam.  It was determined today that Ms. Nices tremor has progressed in severity, and Dr. Pugh has offered her a trial course of Botox.  He plans to provide a total of 0.5 units into each thyroarytenoid muscle, for a total of 1 unit of botulinum a toxin.    STIMULABILITY: mild improvement was elicited with use of forward resonant stimuli and use of inhalation phonation    RECOMMENDATIONS:     A course of speech therapy is recommended to optimize vocal technique, improve voice quality and optimize surgical results.    We recommended that she continue speech therapy with Ms. Mavis Oviedo.  However, Dr. Pugh would like for her to follow-up for one appointment with me approximately 4 weeks after her Botox treatment,  to assess her progress.  We also agreed to continue today with presurgical therapy.    DURATION / FREQUENCY: 2 speech therapy appointments in our clinic.  She will continue therapy with her regular speech-language pathologist. A total of 6-8 sessions may be necessary.    GOALS:  Patient goal:   1. To improve and maintain a healthy voice quality  2. To understand the problem and fix it as much as possible  3. To have a normal and acceptable voice quality    Short-term goal(s): Within the first 4 sessions, Ms. Nava:  1. learn about post-operative voice use   2.  Learn about tracking her progress following after receiving Botox treatment.  3.  Learn commons signs and symptoms of receiving an adequate dose of Botox treatment.    Long-term goal(s): In 6 months, Ms. Nava will:  1. Report a week of typical vocal activities, in which dysphonia that does not exceed a level of 3 out of 10, 80% of the time   2. Report resolution of dysphonia, and use of optimal voice quality to meet personal, social, and professional needs, 80% of the time during a typical week of vocal activities  Certification period:   Certification date from: 8/06/19  Certification date to: 11/4/19    This treatment plan was developed with the patient who agreed with the recommendations.  _______________________________________________________________________  THERAPY NOTE (CPT 74006)  Date of Service: 8/6/2019    SUBJECTIVE / OBJECTIVE:  Please refer to my evaluation report from today's encounter for full details regarding subjective data, patient reported measures, and diagnostic findings.    THERAPEUTIC ACTIVITIES  Counseling and Education    Asked many questions about the nature of her symptoms, and I answered all of these thoroughly.  o I provided education regarding tremor and resources to help manage and cope with her voice issues.  I highly recommended that she focus on all therapeutic activities that increase flow phonation, and increased  muscle tension could lead to post-treatment problems with voice and breathing.  o Ms. Nava learned about Botox treatment, and what to expect following her initial treatment.  o I highly recommended that she keep the daily journal to track her symptoms and progress.  I further noted that she should expect to experience common symptoms, such as difficulty swallowing liquids, a breathy voice quality, a weakend cough.      ASSESSMENT/PLAN  PROGRESS TOWARD LONG TERM GOALS:   Minimal at this point, as this is first session, but good learning today    IMPRESSIONS: R49.0 (Dysphonia) in the context of R25.1 (Tremor). Ms. Nava demonstrated good understanding of the perioperative care provided today.    PLAN: I will see Ms. Nava in 4-6 weeks following her Botox treatment to assess her progress    TOTAL SERVICE TIME: 60 minutes  EVALUATION OF VOICE AND RESONANCE (02787)  LARYNGEAL FUNCTION STUDIES (81668)  NO CHARGE FACILITY FEE (05782)    Aida Matthew M.M. (voice) M.A., CCC/SLP  Speech-Language Pathologist  [facility] Trained Vocologist  [facility]  [phone]  [email or web address]  Prounouns: she/her

## 2019-08-06 NOTE — LETTER
8/6/2019       RE: Florida Nava  [address] [place] [address]     Dear Colleague,    Thank you for referring your patient, [place] Nava, to the [facility] EAR NOSE AND THROAT at [facility]. Please see a copy of my visit note below.      HISTORY OF PRESENT ILLNESS: Florida Nava is a 69 year old female with a history of dysphonia.  She was seen by Dr. Garcia in 2017 for dysphonia she has had a tremor and voice disturbance for at least 40 years.  She was seen at [facility] by myself  in 1996.  At that time the tremor was much milder and it was felt that botulinum toxin injections would not improve her voice substantially to proceed without a trial of speech therapy first .   Recommendation was for holding off on Botox at that time.  In 1977 she noted she is having more problems on volume and projection.  She does not feel that her voice is breathy and did not have any heartburn.  It was felt at that time the symptoms were more related to tremor and less related to laryngeal dystonia.  Voice therapy was recommended.  She did proceed with therapy in April 2019.  This was primarily due to the distance that she had to drive.  She has a strong history of a vocal tremor on the paternal side of her family.    Currently she complains of poor vocal quality with voice breaks and a shaky and unsteady voice.  She has undergone speech therapy at [facility].  She notes that her mother did have similar voice problems but her mother developed it a much older age than she did.     Last 2 Scores for Patient-Answered VHI Questionnaire  VHI Total Score 8/5/2019   VHI Total Score 25       Last 2 Scores for Patient-Answered CSI Questionnaire  No flowsheet data found.      Last 2 Scores for Patient-Answered EAT Questionnaire  EAT Total Score 8/5/2019   EAT Total Score 0         Active Problems  Reconcile with Patient's Chart    Problem Noted Date   Dysphonia 06/20/2019    Postmenopausal status 2016   Basal cell carcinoma of skin 2016   Bunion 2016   Diverticulosis of large intestine 2016   Static tremor 2016   Overview:     Hand tremors;   Neurological Consult: Dr. Funes     Hyperlipidemia 2016   Overview:     mild; treated with life style intervention     Osteopenia 2016   Overview:     ?      Swelling of limb 2016   Overview:     knee high compression hose     Asymptomatic varicose veins 2016   Family history of colon cancer 2016   Hallux rigidus 2013   Hammertoe 2013         Surgical History      Surgery Date Site/Laterality Comments   FOOT SURGERY          DILATION AND CURETTAGE OF UTERUS          MOHS SURGERY          CARDIOVERSION 2017   sinus x 10 min, then back to afib     EP ABLATION PVI 2018 Left Procedure: EP Ablation PVI; Surgeon: Monica Miller MD; Location: [facility]; Service:          Medical History      Medical History Date Comments   Skin cancer       Atrial fibrillation (H)       Familial tremor       DVT (deep vein thrombosis) in pregnancy (H) 2017 rt leg from atrial fib   PE (pulmonary thromboembolism) (H) 2017 dvt rt leg and PE rt lung     Social History      Tobacco Use Types Packs/Day Years Used Date   Never Smoker           Smokeless Tobacco: Never Used           Alcohol Use Drinks/Week std. drinks/Week Comments   Yes 0 Standard drinks or equivalent   0.0      Family History      Medical History Relation Name Comments   Atrial fibrillation Brother       Pacemaker Brother       Prostate cancer Brother       No Medical Problems Father       Hypertension Mother       Colon cancer Sister       No Medical Problems Sister         Relation Name Status Comments   Brother   Alive     Brother    suicide   Brother   Alive     Father    (Age 90)     Mother    (Age 96)     Sister        Sister   Alive          SOCIAL  HISTORY:   Social History     Tobacco Use     Smoking status: Not on file   Substance Use Topics     Alcohol use: Not on file       REVIEW OF SYSTEMS: Ten point review of systems was performed and is negative except for: No flowsheet data found.     ALLERGIES: Patient has no allergy information on record.    MEDICATIONS:   No current outpatient medications on file.         PHYSICAL EXAMINATION:  She  is awake, alert and in no apparent distress.    Her tympanic membranes are clear and intact bilaterally. External auditory canals are clear.  Nasal exam shows a mild septal deviation without obstruction.  Examination of the oral cavity shows no suspicious lesions.  There is symmetric movement of the tongue and soft palate.    The oropharynx is clear.  With phonation there is a mild 3 to 5 Hz tremor.  Her neck is supple without significant adenopathy.  Pulse is regular.  Upper airway is clear.  Cranial nerves II-XII are grossly intact.   Minimal to no head tremor is seen minimal hand tremor is seen.    PROCEDURE: A flexible laryngoscopy  was performed.  Informed consent was obtained and a time out was performed. 3% lidocaine and 0.25% phenylephrine was sprayed into the nasal cavity and allowed 3 to 5 minutes for effect. The scope was passed through the right sided nostril. Examination showed the vocal folds to be mobile and meet in the midline.  No nodules, polyps or ulcerations are seen.  There is minimal to no  inflammation or erythema of the supraglottic or glottic larynx.  With phonation there is moderate contraction of the supraglottic larynx.  There is a regular 3 to 5 Hz tremor involving the vocal folds and a horizontal fashion.  With lower louder volumes there is pharyngeal component to the tremor as well.  The hypopharynx is otherwise clear as is the subglottis.     8/6/2019 Exam        IMPRESSION/PLAN: Benign essential tremor of the vocal folds.  This is quite symptomatic for her and is failed to respond to  therapy and other medications.  She is interested in pursuing botulinum toxin injections.  I advised her that this will reduce the tremor only as it decreases her voice but given the extent of her tremor she may find this beneficial.  We will set her up for next botulinum toxin injection clinic and begin with a dose of 0.5 units approximately toxin into each thyroid arytenoid muscle.    Again, thank you for allowing me to participate in the care of your patient.      Sincerely,    Jose D Pugh MD

## 2019-08-06 NOTE — PROGRESS NOTES
HISTORY OF PRESENT ILLNESS: [place] Nava is a 69 year old female with a history of dysphonia.  She was seen by Dr. Garcia in 2017 for dysphonia she has had a tremor and voice disturbance for at least 40 years.  She was seen at [facility] by myself  in 1996.  At that time the tremor was much milder and it was felt that botulinum toxin injections would not improve her voice substantially to proceed without a trial of speech therapy first .   Recommendation was for holding off on Botox at that time.  In 1977 she noted she is having more problems on volume and projection.  She does not feel that her voice is breathy and did not have any heartburn.  It was felt at that time the symptoms were more related to tremor and less related to laryngeal dystonia.  Voice therapy was recommended.  She did proceed with therapy in April 2019.  This was primarily due to the distance that she had to drive.  She has a strong history of a vocal tremor on the paternal side of her family.    Currently she complains of poor vocal quality with voice breaks and a shaky and unsteady voice.  She has undergone speech therapy at [facility].  She notes that her mother did have similar voice problems but her mother developed it a much older age than she did.     Last 2 Scores for Patient-Answered VHI Questionnaire  VHI Total Score 8/5/2019   VHI Total Score 25       Last 2 Scores for Patient-Answered CSI Questionnaire  No flowsheet data found.      Last 2 Scores for Patient-Answered EAT Questionnaire  EAT Total Score 8/5/2019   EAT Total Score 0         Active Problems  Reconcile with Patient's Chart    Problem Noted Date   Dysphonia 06/20/2019   Postmenopausal status 05/25/2016   Basal cell carcinoma of skin 05/25/2016   Bunion 05/25/2016   Diverticulosis of large intestine 05/25/2016   Static tremor 05/25/2016   Overview:     Hand tremors;   Neurological Consult: Dr. Funes     Hyperlipidemia 05/25/2016   Overview:      mild; treated with life style intervention     Osteopenia 2016   Overview:     ?      Swelling of limb 2016   Overview:     knee high compression hose     Asymptomatic varicose veins 2016   Family history of colon cancer 2016   Hallux rigidus 2013   Hammertoe 2013         Surgical History      Surgery Date Site/Laterality Comments   FOOT SURGERY          DILATION AND CURETTAGE OF UTERUS          MOHS SURGERY          CARDIOVERSION 2017   sinus x 10 min, then back to afib     EP ABLATION PVI 2018 Left Procedure: EP Ablation PVI; Surgeon: Monica Miller MD; Location: [facility] Lab; Service:          Medical History      Medical History Date Comments   Skin cancer       Atrial fibrillation (H)       Familial tremor       DVT (deep vein thrombosis) in pregnancy (H) 2017 rt leg from atrial fib   PE (pulmonary thromboembolism) (H) 2017 dvt rt leg and PE rt lung     Social History      Tobacco Use Types Packs/Day Years Used Date   Never Smoker           Smokeless Tobacco: Never Used           Alcohol Use Drinks/Week std. drinks/Week Comments   Yes 0 Standard drinks or equivalent   0.0      Family History      Medical History Relation Name Comments   Atrial fibrillation Brother       Pacemaker Brother       Prostate cancer Brother       No Medical Problems Father       Hypertension Mother       Colon cancer Sister       No Medical Problems Sister         Relation Name Status Comments   Brother   Alive     Brother    suicide   Brother   Alive     Father    (Age 90)     Mother    (Age 96)     Sister        Sister   Alive          SOCIAL HISTORY:   Social History     Tobacco Use     Smoking status: Not on file   Substance Use Topics     Alcohol use: Not on file       REVIEW OF SYSTEMS: Ten point review of systems was performed and is negative except for: No flowsheet data found.     ALLERGIES: Patient has no allergy  information on record.    MEDICATIONS:   No current outpatient medications on file.         PHYSICAL EXAMINATION:  She  is awake, alert and in no apparent distress.    Her tympanic membranes are clear and intact bilaterally. External auditory canals are clear.  Nasal exam shows a mild septal deviation without obstruction.  Examination of the oral cavity shows no suspicious lesions.  There is symmetric movement of the tongue and soft palate.    The oropharynx is clear.  With phonation there is a mild 3 to 5 Hz tremor.  Her neck is supple without significant adenopathy.  Pulse is regular.  Upper airway is clear.  Cranial nerves II-XII are grossly intact.   Minimal to no head tremor is seen minimal hand tremor is seen.    PROCEDURE: A flexible laryngoscopy  was performed.  Informed consent was obtained and a time out was performed. 3% lidocaine and 0.25% phenylephrine was sprayed into the nasal cavity and allowed 3 to 5 minutes for effect. The scope was passed through the right sided nostril. Examination showed the vocal folds to be mobile and meet in the midline.  No nodules, polyps or ulcerations are seen.  There is minimal to no  inflammation or erythema of the supraglottic or glottic larynx.  With phonation there is moderate contraction of the supraglottic larynx.  There is a regular 3 to 5 Hz tremor involving the vocal folds and a horizontal fashion.  With lower louder volumes there is pharyngeal component to the tremor as well.  The hypopharynx is otherwise clear as is the subglottis.     8/6/2019 Exam        IMPRESSION/PLAN: Benign essential tremor of the vocal folds.  This is quite symptomatic for her and is failed to respond to therapy and other medications.  She is interested in pursuing botulinum toxin injections.  I advised her that this will reduce the tremor only as it decreases her voice but given the extent of her tremor she may find this beneficial.  We will set her up for next botulinum toxin  injection clinic and begin with a dose of 0.5 units approximately toxin into each thyroid arytenoid muscle.

## 2019-08-06 NOTE — LETTER
8/6/2019       RE: Florida Nava  [address] [place] [address]     Dear Colleague,    Thank you for referring your patient, [place] Nava, to the [facility]. Please see a copy of my visit note below.    [facility]  Jose D Pugh Jr., M.D., F.A.C.S.  Lili Fraga M.D., M.P.H.  Radha Barragan, Ph.D., CCC/SLP  Aida Matthew M.M. (voice), M.A., CCC/SLP  Butch Saavedra M.M. (voice), M.A., CCC/SLP    Evaluation report    Clinician: Aida Matthew M.M. (voice), M.A., CCC/SLP  Seen in conjunction with: Dr. Pugh  Referring physician:  Keaton  Patient: [place] Nava  Date of Visit: 8/6/2019    HISTORY  Chief complaint: [place] Nava is a 69 year old presenting today for evaluation of dysphonia.  Salient history: She has a history significant for benign essential tremor.  In 2017,  She was seen by Dr. Garcia for dysphonia in the context of a tremor and voice disturbance that has been present for approximately 40 years.  In 1996 she was evaluated by Dr. Pugh at [facility].  His reports note that her tremor was much more mild in 1996, and he felt that Botox would not be an effective treatment in order to improve her voice quality at that time.  She has since pursued courses of speech therapy with Mavis Oviedo (April-August 2019), and Jenny Rodríguez.  However, she returns for evaluation today to determine if she may be a candidate for a trial course of Botox treatment.    CURRENT SYMPTOMS INCLUDE  VOICE    She describes her voice as having voice breaks, and shaky/unsteady voice quality.    She reports that her tremor began in her 20s, and reports that her mother developed a tremor at an older age.    THROAT ISSUES    Denies issues with cough, throat clearing, globus sensation.    SWALLOWING    Denies any history of swallowing issues.    RESPIRATION    Denies any breathing issues.    OTHER PERTINENT HISTORY    Please also refer to   "Goding's dictation.     No past medical history on file.  No past surgical history on file.    OBJECTIVE  PATIENT REPORTED MEASURES    Effort to talk: 7 / 10 (0-10 in which 10 represents maximal effort)    Effort to sing: n/a / 10 (0-10 in which 10 represents maximal effort)    Voice quality: 2 / 10 (0-10 in which 10 represents best possible voice)  Patient Supplied Answers To VHI Questionnaire  Voice Handicap Index (VHI-10) 8/5/2019   My voice makes it difficult for people to hear me 2   People have difficulty understanding me in a noisy room 3   My voice difficulties restrict my personal and social life.  3   I feel left out of conversations because of my voice 2   My voice problem causes me to lose income 0   I feel as though I have to strain to produce voice 2   The clarity of my voice is unpredictable 3   My voice problem upsets me 4   My voice makes me feel handicapped 4   People ask, \"What's wrong with your voice?\" 2   VHI-10 25       Patient Supplied Answers To CSI Questionnaire  No flowsheet data found.    Patient Supplied Answers To EAT Questionnaire  Eating Assessment Tool (EAT-10) 8/5/2019   My swallowing problem has caused me to lose weight 0   My swallowing problem interferes with my ability to go out for meals 0   Swallowing liquids takes extra effort 0   Swallowing solids takes extra effort 0   Swallowing pills takes extra effort 0   Swallowing is painful 0   The pleasure of eating is affected by my swallowing 0   When I swallow food sticks in my throat 0   I cough when I eat 0   Swallowing is stressful 0   EAT-10 0       PERCEPTUAL EVALUATION (CPT 47368)  POSTURE / TENSION:     upper body    BREATHING:     appears within normal limits and adequate     LARYNGEAL PALPATION: n/a    VOICE:    Roughness: Mild intermittent    Breathiness: minimal    Strain: WNL    3 to 5 Hz tremor     Intermittent glottal virk    loudness    Conversational speech:  Mild to moderately reduced    Projected speech:  " "WNL    Pitch:    Conversational speech:  WNL    Pitch glide: does not demonstrate any SLN weakness    Resonance:    Conversational speech:  laryngeal pharyngeal resonance    Singing vs. Speech:  n/a    CAPE-V Overall Severity:  55/100    SPEECH    Mild lisp noted especially with /s/    COUGH/THROAT CLEARING:    Not observed    LARYNGEAL FUNCTION STUDIES (CPT 11958)  Fundamental frequency Metrics     /a/ mean F0 = 176 Hz (SD = 14.13 Hz)     /i/ mean F0 = 168 Hz (SD = 20.57 Hz)     [place] Mean f0 = 168 Hz (SD 24.59 Hz)     [place]:         Min F0 = 149 Hz        Max F0 = 665 Hz        Range = 516 Hz        Notes = limited upper range    Cepstral Measures     CPPS /a/ = 20.75 dB     CPPS /i/ = 23.17 dB     CPPS \"all voiced\" = 19.61 dB     AVQI (v.3.01) = 3.19    Additional Measures     Harmonic to Noise Ratio /a/ = 19.19 dB     Harmonic to Noise Ratio: [place] = 15.03 dB     Jitter (local) /a/ = 0.890 %     Shimmer (local) /a/ = 2.634 %    Aerodynamic Measures     Protocol / Parameter Result Normative Mean (SD)   Vital Capacity     Expiratory Volume 1.53 Liters 2.24 (0.6) Liters   Comfortable Sustained Phonation     Mean SPL 74.66 dB 79.89 (5.47) dB   Mean Pitch 204.84 Hz 185.27 (25.93) Hz   Peak Expiratory Airflow 0.718 Lit/Sec 0.17 (0.09) Lit/Sec   Mean Expiratory Airflow 0.248 Lit/Sec 0.1 (0.06) Lit/Sec   Voicing Efficiency     Peak Air Pressure 7.58 cm H2O 9.7 (5.12) cm H2O   Mean Peak Air Pressure 6.56 cm H2O 7.95 (4.28) cm H2O   Peak Expiratory Airflow 0.429 Lit/Sec 0.25 (0.19) Lit/Sec   Mean Airflow During Voicing 0.187 Lit/Sec 0.13 (0.07) Lit/Sec   Running Speech: All Voiced Stimulus     Mean SPL 62.1 dB    Mean Pitch 165.82 Hz    Peak Expiratory Airflow 0.553 Lit/Sec    Mean Expiratory Airflow 0.197 Lit/Sec    Mean Airflow During Voicing 0.164 Lit/Sec    Running Speech: [place]     Mean SPL 61.36 dB    SPL Range 45.18 dB    Mean Pitch 175.94 Hz    Pitch Range 217.72 Hz    Peak " Expiratory Airflow 1.147 Lit/Sec    Mean Expiratory Airflow 0.16 Lit/Sec    Expiratory Volume 3.55 Liters    Mean Airflow During Voicing 0.152 Lit/Sec    Peak Inspiratory Airflow -1.992 Lit/Sec    Inspiratory Volume -3.3 Liters        LARYNGEAL EXAMINATION  Procedure: Flexible endoscopy with chip-tip technology without stroboscopy, right nostril; topical anesthesia with 3% Lidocaine and 0.25% phenylephrine was applied.   Performed by: Dr. Pugh   The laryngeal and pharyngeal structures were evaluated for gross appearance, mobility, function, and focal lesions / abnormalities of the associated mucosa.    All findings were within normal limits with the exception of the following salient features:     Mild collection of dry secretions throughout the pharynx    Moderate supraglottic hyperfunction during connected speech tasks    3 to 5 Hz tremor involving the vocal folds and a horizontal motion, especially observed during sustained phonation    THERAPY PROBES: mild improvement was elicited with use of forward resonant stimuli and use of inhalation phonation       Abducted view of the vocal folds    The laryngeal exam was reviewed with Ms. Nava, and I provided pertinent explanations, as well as written and oral information.    ASSESSMENT / PLAN  IMPRESSIONS: [place] Nava is a 69-year-old female, presenting today with R49.0 (Dysphonia) in the context of R25.1 (Tremor), as evidenced by evaluation, the results of the laryngeal function studies, as well as the laryngeal exam.  It was determined today that Ms. Nices tremor has progressed in severity, and Dr. Pugh has offered her a trial course of Botox.  He plans to provide a total of 0.5 units into each thyroarytenoid muscle, for a total of 1 unit of botulinum a toxin.    STIMULABILITY: mild improvement was elicited with use of forward resonant stimuli and use of inhalation phonation    RECOMMENDATIONS:     A course of speech therapy is recommended to optimize  vocal technique, improve voice quality and optimize surgical results.    We recommended that she continue speech therapy with Ms. Mavis Oviedo.  However, Dr. Pugh would like for her to follow-up for one appointment with me approximately 4 weeks after her Botox treatment, to assess her progress.  We also agreed to continue today with presurgical therapy.    DURATION / FREQUENCY: 2 speech therapy appointments in our clinic.  She will continue therapy with her regular speech-language pathologist. A total of 6-8 sessions may be necessary.    GOALS:  Patient goal:   1. To improve and maintain a healthy voice quality  2. To understand the problem and fix it as much as possible  3. To have a normal and acceptable voice quality    Short-term goal(s): Within the first 4 sessions, Ms. Nava:  1. learn about post-operative voice use   2.  Learn about tracking her progress following after receiving Botox treatment.  3.  Learn commons signs and symptoms of receiving an adequate dose of Botox treatment.    Long-term goal(s): In 6 months, Ms. Nava will:  1. Report a week of typical vocal activities, in which dysphonia that does not exceed a level of 3 out of 10, 80% of the time   2. Report resolution of dysphonia, and use of optimal voice quality to meet personal, social, and professional needs, 80% of the time during a typical week of vocal activities  Certification period:   Certification date from: 8/06/19  Certification date to: 11/4/19    This treatment plan was developed with the patient who agreed with the recommendations.  _______________________________________________________________________  THERAPY NOTE (CPT 04173)  Date of Service: 8/6/2019    SUBJECTIVE / OBJECTIVE:  Please refer to my evaluation report from today's encounter for full details regarding subjective data, patient reported measures, and diagnostic findings.    THERAPEUTIC ACTIVITIES  Counseling and Education    Asked many questions about the  nature of her symptoms, and I answered all of these thoroughly.  o I provided education regarding tremor and resources to help manage and cope with her voice issues.  I highly recommended that she focus on all therapeutic activities that increase flow phonation, and increased muscle tension could lead to post-treatment problems with voice and breathing.  o Ms. Nava learned about Botox treatment, and what to expect following her initial treatment.  o I highly recommended that she keep the daily journal to track her symptoms and progress.  I further noted that she should expect to experience common symptoms, such as difficulty swallowing liquids, a breathy voice quality, a weakend cough.      ASSESSMENT/PLAN  PROGRESS TOWARD LONG TERM GOALS:   Minimal at this point, as this is first session, but good learning today    IMPRESSIONS: R49.0 (Dysphonia) in the context of R25.1 (Tremor). Ms. Nava demonstrated good understanding of the perioperative care provided today.    PLAN: I will see Ms. Nava in 4-6 weeks following her Botox treatment to assess her progress    TOTAL SERVICE TIME: 60 minutes  EVALUATION OF VOICE AND RESONANCE (50371)  LARYNGEAL FUNCTION STUDIES (91428)  NO CHARGE FACILITY FEE (14158)    Aida Matthew M.M. (voice) MYENNY, CCC/SLP  Speech-Language Pathologist  [facility] Trained Vocologist  [facility]  [phone]  [email or web address]  Prounouns: she/her                                                                                                     Outpatient Speech Language Therapy Evaluation  PLAN OF TREATMENT FOR OUTPATIENT REHABILITATION  (COMPLETE FOR INITIAL CLAIMS ONLY)  Patient's Last Name, First Name, M.I.  YOB: 1950  [place] Nava                           Provider's Name  Aida Matthew Medical Record No.  [id number]                               Onset Date:  8/06/19   Start of Care Date: 8/06/19     Type: Speech Language Therapy Medical  Diagnosis: No primary diagnosis found.                        Therapy Diagnosis:  No primary diagnosis found.   Visits from SOC:  1   _________________________________________________________________________________  Plan of Treatment:   Speech therapy    DURATION/FREQUENCY OF TREATMENT  Six weekly, one-hour sessions, with two monthly one-hour follow-up sessions    PROGNOSIS  Good prognosis for voice improvement with speech therapy and regular practice of therapeutic activities.    BARRIERS TO LEARNING/TEACHING AND LEARNING NEEDS  None/Unremarkable    GOALS:  Patient goal:   4. To improve and maintain a healthy voice quality  5. To understand the problem and fix it as much as possible  6. To have a normal and acceptable voice quality    Short-term goal(s): Within the first 4 sessions, Ms. Nava:  2. learn about post-operative voice use   2.  Learn about tracking her progress following after receiving Botox treatment.  3.  Learn commons signs and symptoms of receiving an adequate dose of Botox treatment.    Long-term goal(s): In 6 months, Ms. Nava will:  3. Report a week of typical vocal activities, in which dysphonia that does not exceed a level of 3 out of 10, 80% of the time   Report resolution of dysphonia, and use of optimal voice quality to meet personal, social, and professional needs, 80% of the time during a typical week of vocal activities  _________________________________________________________________________________    I CERTIFY THE NEED FOR THESE SERVICES FURNISHED UNDER        THIS PLAN OF TREATMENT AND WHILE UNDER MY CARE     (Physician attestation of this document indicates review and certification of the therapy plan).     Certification date from: 8/06/19  Certification date to: 11/4/19    Referring Provider: Jose D Arvizuing       Again, thank you for allowing me to participate in the care of your patient.      Sincerely,    Aida Matthew, SLP

## 2019-08-12 NOTE — PROGRESS NOTES
Outpatient Speech Language Therapy Evaluation  PLAN OF TREATMENT FOR OUTPATIENT REHABILITATION  (COMPLETE FOR INITIAL CLAIMS ONLY)  Patient's Last Name, First Name, M.I.  YOB: 1950  [place] Nava                           Provider's Name  Aida Matthew Medical Record No.  [id number]                               Onset Date:  8/06/19   Start of Care Date: 8/06/19     Type: Speech Language Therapy Medical Diagnosis: No primary diagnosis found.                        Therapy Diagnosis:  No primary diagnosis found.   Visits from SOC:  1   _________________________________________________________________________________  Plan of Treatment:   Speech therapy    DURATION/FREQUENCY OF TREATMENT  Six weekly, one-hour sessions, with two monthly one-hour follow-up sessions    PROGNOSIS  Good prognosis for voice improvement with speech therapy and regular practice of therapeutic activities.    BARRIERS TO LEARNING/TEACHING AND LEARNING NEEDS  None/Unremarkable    GOALS:  Patient goal:   1. To improve and maintain a healthy voice quality  2. To understand the problem and fix it as much as possible  3. To have a normal and acceptable voice quality    Short-term goal(s): Within the first 4 sessions, Ms. Nava:  1. learn about post-operative voice use   2.  Learn about tracking her progress following after receiving Botox treatment.  3.  Learn commons signs and symptoms of receiving an adequate dose of Botox treatment.    Long-term goal(s): In 6 months, Ms. Nava will:  1. Report a week of typical vocal activities, in which dysphonia that does not exceed a level of 3 out of 10, 80% of the time   Report resolution of dysphonia, and use of optimal voice quality to meet personal, social, and professional needs, 80% of the time during a typical week of vocal  activities  _________________________________________________________________________________    I CERTIFY THE NEED FOR THESE SERVICES FURNISHED UNDER        THIS PLAN OF TREATMENT AND WHILE UNDER MY CARE     (Physician attestation of this document indicates review and certification of the therapy plan).     Certification date from: 8/06/19  Certification date to: 11/4/19    Referring Provider: Jose D Pugh

## 2019-08-27 ENCOUNTER — OFFICE VISIT (OUTPATIENT)
Dept: OTOLARYNGOLOGY | Facility: CLINIC | Age: 69
End: 2019-08-27
Payer: MEDICARE

## 2019-08-27 DIAGNOSIS — J38.3 OTHER DISEASES OF VOCAL CORDS: Primary | ICD-10-CM

## 2019-08-27 NOTE — NURSING NOTE
Invasive Procedure Safety Checklist  Procedure:  botox    Responsible person(s):  Complete sections as appropriate and electronically sign and date below.    Staff/Provider  Consent documentation on chart:  YES  H&P is not applicable (when straight local anesthesia is used).    Procedure Team  Completed by comparing informed consent documentation, information on the patient record and/or the marked surgical site, and discussion with the patient/guardian.     Verified:  (Select all that apply)  Patient identification (two indicators)  Procedure to be performed  Procedure site and /or laterality and/or level  Consent  Procedure site:  Site can not be marked due to location.  Provider Daniels - Site/Laterality/Level:  No Level or Structure  Staff/Provider:  No images    Procedure Team:  *Pause for the Cause* verbal and active participation of team members- verify:  Patient name:  YES  Procedure to be performed:  YES  Site, laterality and level, noting patient position:  YES    Above steps completed as applicable (Electronic Signature, Title, Date):    JUANA Han    Note:  Any incidents of wrong patient, wrong procedure, or wrong site are reported using the Occurrence Process already in place.  The occurrence form is required to be completed immediately with this type of event.

## 2019-08-27 NOTE — LETTER
8/27/2019       RE: [place] Nava  [address] [place] [address]     Dear Colleague,    Thank you for referring your patient, [place] Nava, to the [facility] EAR NOSE AND THROAT at [facility]. Please see a copy of my visit note below.      [place] Nava is a 69 year old female with a history of adductor laryngeal dystonia and laryngeal tremor.   This is her first injection.   Our plan is to give 0.5 units of Botulinum A toxin into  each thyroarytenoid muscle today.      PROCEDURE: After obtaining consent, the patient was placed in the supine position. Ground and reference electrodes were applied. The anterior neck was cleaned with an alcohol swab.  Using a 27-gauge, unipolar electromyography needle, the larynx was entered through the cricothyroid space.  00.5 units of botulinum A toxin was injected into each thyroarytenoid muscle.  There was a Strong EMG response to phonation on the left side and a Strong EMG response to phonation on the right side.  The total amount of botulinum A toxin delivered today was 1.0 units. An additional 5 units of botulinum A toxin was necessarily wasted in preparation for the injection. she tolerated the procedure well and left the clinic after a short observation period.         The EMG was necessary specifically in this case to identify areas of muscle overactivity as well as to access the thyroarytenoid muscle which is beneath the thyroid cartilage and is not otherwise directly accessible without EMG guidance.    PLAN: I will have her  follow up on a PRN basis.  She will be following up with our speech-language pathologist to assess results of botulinum toxin injection therapy.    Again, thank you for allowing me to participate in the care of your patient.      Sincerely,    Jose D Pugh MD

## 2019-08-27 NOTE — PROGRESS NOTES
[place] Nava is a 69 year old female with a history of adductor laryngeal dystonia and laryngeal tremor.   This is her first injection.   Our plan is to give 0.5 units of Botulinum A toxin into  each thyroarytenoid muscle today.      PROCEDURE: After obtaining consent, the patient was placed in the supine position. Ground and reference electrodes were applied. The anterior neck was cleaned with an alcohol swab.  Using a 27-gauge, unipolar electromyography needle, the larynx was entered through the cricothyroid space.  00.5 units of botulinum A toxin was injected into each thyroarytenoid muscle.  There was a Strong EMG response to phonation on the left side and a Strong EMG response to phonation on the right side.  The total amount of botulinum A toxin delivered today was 1.0 units. An additional 5 units of botulinum A toxin was necessarily wasted in preparation for the injection. she tolerated the procedure well and left the clinic after a short observation period.         The EMG was necessary specifically in this case to identify areas of muscle overactivity as well as to access the thyroarytenoid muscle which is beneath the thyroid cartilage and is not otherwise directly accessible without EMG guidance.    PLAN: I will have her  follow up on a PRN basis.  She will be following up with our speech-language pathologist to assess results of botulinum toxin injection therapy.

## 2019-09-03 ENCOUNTER — TELEPHONE (OUTPATIENT)
Dept: OTOLARYNGOLOGY | Facility: CLINIC | Age: 69
End: 2019-09-03

## 2019-09-03 NOTE — TELEPHONE ENCOUNTER
[place] called with concerns over the breathy quality of her voice today.  I reassured her that her symptoms were normal, and that she could expect to experience a breathy voice quality for approximately another week.  She also noted a reduction in her cough and occasional mild dysphagia, which I reported are normal symptoms with the Botox treatment.  I emailed her a another copy of our handout regarding tremor, which discusses the Botox treatment.  She will be seeing her  regular speech-language pathologist, Mavis Oviedo on September 13.  In the meantime I encouraged her to journal her symptoms and progress daily, and if possible, to record her voice daily.  She will give me a call in 1 week to touch base on her progress.  I also encouraged her to give me a call around 3 months from her treatment to discuss this initial course of Botox.     - Aida Matthew M.M. (voice) MDongA., CCC/SLP  Speech-Language Pathologist  [facility]  [phone]

## 2019-09-10 ENCOUNTER — TELEPHONE (OUTPATIENT)
Dept: OTOLARYNGOLOGY | Facility: CLINIC | Age: 69
End: 2019-09-10

## 2019-09-10 NOTE — TELEPHONE ENCOUNTER
LVM letting pt know 10/4 appt was cancelled due to provider template/schedule change. Left call center number for rescheduling and sent reschedule letter. (2nd attempt to reschedule)

## 2019-09-11 ENCOUNTER — TELEPHONE (OUTPATIENT)
Dept: OTOLARYNGOLOGY | Facility: CLINIC | Age: 69
End: 2019-09-11

## 2019-09-11 NOTE — TELEPHONE ENCOUNTER
LVM with patient letting her know I got her scheduled for 9/17 appt with Aida Matthew.  Left call center number in case that ends up not working and she needs to reschedule.

## 2019-10-03 ENCOUNTER — OFFICE VISIT (OUTPATIENT)
Dept: OTOLARYNGOLOGY | Facility: CLINIC | Age: 69
End: 2019-10-03
Payer: MEDICARE

## 2019-10-03 DIAGNOSIS — R25.1 TREMOR: ICD-10-CM

## 2019-10-03 DIAGNOSIS — R49.0 DYSPHONIA: Primary | ICD-10-CM

## 2019-10-03 DIAGNOSIS — R47.9 SPEECH DISTURBANCE: ICD-10-CM

## 2019-10-03 NOTE — LETTER
10/3/2019       RE: [place] Nava  [address] [place] [address]     Dear Colleague,    Thank you for referring your patient, [place] Nava, to the [facility] at [facility]. Please see a copy of my visit note below.    [facility]  Jose D Pugh Jr., M.D., F.A.C.S.  Lili Fraga M.D., M.P.H.  Radha Barragan, Ph.D., CCC/SLP  Aida Matthew M.M. (voice), M.A., CCC/SLP  Butch Saavedra M.M. (voice), M.A., CCC/SLP    [facility]  VOICE/SPEECH/BREATHING THERAPY  CLINICAL FOLLOW-UP AND LARYNGEAL EXAMINATION REPORT    Clinician: Aida Matthew M.M. (voice), M.A., CCC/SLP  Referring physician:  Keaton  Patient: [place] Nava  Date of Visit: 10/3/2019    HISTORY  PATIENT INFORMATION  [place] Nava was seen for follow-up evaluation and treatment today.  She received a trial treatment of Botox on 8/27/19.  At that time, Dr. Pugh provided a total of #1.0 units of Botox.    PROGRESS SINCE LAST VISIT  Ms. Nava reports:    No trouble with swallowing or breathing, but she had difficulty getting words out.    Breathiness lasted for almost 5 weeks; worst for the first    She also reports that there were times where she was unable to make any sound come out.    Still poor/ variable voice quality at times last week.     Her Botox treatment occurred on 8/27/19; #1 unit of Botox was received (0.5 into each thyroarytenoid muscle)    PROGRESS ON LONG-TERM GOALS: variable progress.      OBJECTIVE FINDINGS  PERCEPTUAL EVALUATION (CPT 72889)  POSTURE / TENSION:     neck and shoulders    BREATHING:     appears within normal limits and adequate     LARYNGEAL PALPATION: n/a    VOICE:    Roughness: Minimal    Breathiness: Mild to moderate    Strain: WNL    Loudness    Conversational speech:  Mildly reduced    Projected speech:  WNL    Pitch:    Conversational speech:  WNL    Pitch glide: WFL    Resonance:    Conversational speech:  laryngeal pharyngeal  "resonance    Singing vs. Speech:  N/a    CAPE-V Overall Severity:  25/100    Speech remains affected by a lisp, and today there were moments of phonatory breaks that I questioned to be associated with a possible tremor in the base of her tongue.    COUGH/THROAT CLEARING:    Not observed    LARYNGEAL FUNCTION STUDIES (CPT 34718)  Fundamental frequency Metrics     /a/ mean F0 = 176 Hz (SD = 8.58 Hz)     /i/ mean F0 = 177 Hz (SD = 4.71 Hz)     [place] Pass[place] Mean f0 = 178 Hz (SD 20.15 Hz)     Glide:         Min F0 = 122 Hz        Max F0 = 615 Hz        Range = 492 Hz        Notes = limited upper and lower range    Cepstral Measures     CPPS /a/ = 21.04 dB     CPPS /i/ = 19.54 dB     CPPS \"all voiced\" = 17.86 dB     AVQI (v.3.01) = 3.27    Additional Measures     Harmonic to Noise Ratio /a/ = 19.06 dB     Harmonic to Noise Ratio: [place] = 15.59 dB     Jitter (local) /a/ = 0.753 %     Shimmer (local) /a/ = 2.907 %    Aerodynamic Measures     Protocol / Parameter Result Normative Mean (SD)   Vital Capacity     Expiratory Volume 1.35 Liters 2.24 (0.6) Liters   Comfortable Sustained Phonation     Mean SPL 71.42 dB 79.89 (5.47) dB   Mean Pitch 183.62 Hz 185.27 (25.93) Hz   Peak Expiratory Airflow 0.745 Lit/Sec 0.17 (0.09) Lit/Sec   Mean Expiratory Airflow 0.328 Lit/Sec 0.1 (0.06) Lit/Sec   Voicing Efficiency     Peak Air Pressure -1 cm H2O 9.7 (5.12) cm H2O   Mean Peak Air Pressure -1 cm H2O 7.95 (4.28) cm H2O   Peak Expiratory Airflow 0.427 Lit/Sec 0.25 (0.19) Lit/Sec   Mean Airflow During Voicing 0.223 Lit/Sec 0.13 (0.07) Lit/Sec   Running Speech: All Voiced Stimulus     Mean SPL 60.67 dB    Mean Pitch 194.41 Hz    Peak Expiratory Airflow 0.719 Lit/Sec    Mean Expiratory Airflow 0.245 Lit/Sec    Mean Airflow During Voicing 0.253 Lit/Sec    Running Speech: [place] Passage     Mean SPL 61.19 dB    SPL Range 43.73 dB    Mean Pitch 185.55 Hz    Pitch Range 234.74 Hz    Peak Expiratory Airflow 0.954 Lit/Sec  "   Mean Expiratory Airflow 0.193 Lit/Sec    Expiratory Volume 4.26 Liters    Mean Airflow During Voicing 0.215 Lit/Sec    Peak Inspiratory Airflow -2.06 Lit/Sec    Inspiratory Volume -3.54 Liters        LARYNGEAL EXAMINATION  Not warranted today    IMPRESSIONS/ RECOMMENDATIONS/ PLAN  IMPRESSIONS: [place] Nava is a 69 year old female, presenting today for follow-up with R49.0 (Dysphonia) and R25.1 (Tremor) in the context of a speech disturbance involving a lisp.  Today, she demonstrated a strong connection between her articulation and ability to optimally phonate.  In addition, she continues to have difficulties with her voice quality, and has experienced limited progress during her initial trial of Botox treatment.  We discussed her progress, and agreed that she has many questions that are difficult for me to assist with, considering my current limited involvement in her treatment.  If Botox were to be pursued again, I believe it would be helpful to work together to better address her progress and needs, especially since there is an additional speech component to her symptoms.  We discussed that she would be very welcome to return to [facility] afterwards, but again, agreed that I have not been able to provide as complete a service to her post-treatment questions, concerns and needs.  Ms. Nava was open to this idea of a course of therapy here, and mentioned that she had an appointmment with Ms. Oviedo later today.  I recommended that she contact Ms. Oviedo immediately, as it is very likely that her insurance would not cover a similar service provided on the same day.  I will let her decide how she would ultimately like to proceed with her care.    RECOMMENDATIONS:     An ongoing course of speech therapy is recommended to optimize vocal technique and improve voice quality.  It is currently recommended to pursue a course of treatment here, while pursuing her next trial of Botox , if she chooses to try the  Botox treatment again with Dr. Pugh. I believe this would allow for better intervention and response to the nuances of Ms. Nava's voice and speech concerns.    She demonstrates a Good prognosis for improvement given adherence to therapeutic recommendations.     Positive indicators: high level of comittment previously positive response to behavioral therapy    Negative indicators: limited current progress with initial Botox treatment; Botox tends to respond better to spasmodic dysphonia, than tremor.  Present lisp adds a level of complexity to Ms. Nava's symtoms.    DURATION / FREQUENCY: 2 one-hour sessions prior to next Botox treatment, and 2-3 appointments following her next treatment.    GOALS:  Patient goal:   1. To improve and maintain a healthy voice quality  2. To understand the problem and fix it as much as possible  3. To have a normal and acceptable voice quality    Short-term goal(s): Within the first 4 sessions, Ms. Nava:  1. will utilize silent inhalation with good low-respiratory engagement 75% of the time during therapy tasks with minimal clinician support  2. will accurately identify target vs. habitual voice quality during therapy tasks in 4 out of 5 trials with no clinician support  3. will demonstrate the ability to alternate between target and habitual voice quality given clinician cue 75% of the time during therapy tasks    Long-term goal(s): In 6 months, Ms. Nava will:  1. Report resolution of symptoms, and use of optimal voice quality and comfort to meet personal, social, and professional needs, 90% of the time during a typical week of vocal activities    Certification period:   Certification date from: 10/03/19  Certification date to: 1/1/20    This treatment plan was developed with the patient who agreed with the recommendations.    TOTAL SERVICE TIME: 60 minutes  EVALUATION OF VOICE AND RESONANCE (66909)  LARYNGEAL FUNCTION STUDIES (21125)  NO CHARGE FACILITY FEE  (14759)    Aida Matthew M.M. (voice), M.A., CCC/SLP  Speech-Language Pathologist  Certificate of Vocology  [facility]  [phone]  [email or web address]  Prounouns: she/her                                                                                                         Outpatient Speech Language Therapy Evaluation  PLAN OF TREATMENT FOR OUTPATIENT REHABILITATION  (COMPLETE FOR INITIAL CLAIMS ONLY)  Patient's Last Name, First Name, M.I.  YOB: 1950  [place] Nava                           Provider's Name  Aida Matthew, SLP Medical Record No.  [id number]                               Onset Date:  10/03/19   Start of Care Date: 10/03/19     Type: Speech Language Therapy Medical Diagnosis: No primary diagnosis found.                        Therapy Diagnosis:  No primary diagnosis found.   Visits from SOC:  1   _________________________________________________________________________________  Plan of Treatment:   Speech therapy    DURATION/FREQUENCY OF TREATMENT  Six weekly, one-hour sessions, with two monthly one-hour follow-up sessions    PROGNOSIS  Good prognosis for voice improvement with speech therapy and regular practice of therapeutic activities.    BARRIERS TO LEARNING/TEACHING AND LEARNING NEEDS  None/Unremarkable    GOALS:  Patient goal:   4. To improve and maintain a healthy voice quality  5. To understand the problem and fix it as much as possible  6. To have a normal and acceptable voice quality    Short-term goal(s): Within the first 4 sessions, Ms. Nava:  4. will utilize silent inhalation with good low-respiratory engagement 75% of the time during therapy tasks with minimal clinician support  5. will accurately identify target vs. habitual voice quality during therapy tasks in 4 out of 5 trials with no clinician support  6. will demonstrate the ability to alternate between target and habitual voice quality given clinician cue 75% of the time during therapy  tasks    Long-term goal(s): In 6 months, Ms. Nava will:  2. Report resolution of symptoms, and use of optimal voice quality and comfort to meet personal, social, and professional needs, 90% of the time during a typical week of vocal activities  _________________________________________________________________________________    I CERTIFY THE NEED FOR THESE SERVICES FURNISHED UNDER        THIS PLAN OF TREATMENT AND WHILE UNDER MY CARE     (Physician attestation of this document indicates review and certification of the therapy plan).     Certification date from: 10/03/19  Certification date to: 1/1/20    Referring Provider: Jose D Arvizuing       Again, thank you for allowing me to participate in the care of your patient.      Sincerely,    Aida Matthew, SLP

## 2019-10-03 NOTE — PROGRESS NOTES
[facility] VOICE [facility]  Jose D Pugh Jr., M.D., F.A.C.S.  Lili Fraga M.D., M.P.H.  Radha Barragan, Ph.D., CCC/SLP  Aida Matthew M.M. (voice), M.A., CCC/SLP  Butch Saavedra M.M. (voice), M.A., [facility]/SLP    [facility] VOICE [facility]  VOICE/SPEECH/BREATHING THERAPY  CLINICAL FOLLOW-UP AND LARYNGEAL EXAMINATION REPORT    Clinician: Aida Matthew M.M. (voice), M.A., CCC/SLP  Referring physician:  Keaton  Patient: [place] Nava  Date of Visit: 10/3/2019    HISTORY  PATIENT INFORMATION  [place] Nava was seen for follow-up evaluation and treatment today.  She received a trial treatment of Botox on 8/27/19.  At that time, Dr. Pugh provided a total of #1.0 units of Botox.    PROGRESS SINCE LAST VISIT  Ms. Nava reports:    No trouble with swallowing or breathing, but she had difficulty getting words out.    Breathiness lasted for almost 5 weeks; worst for the first    She also reports that there were times where she was unable to make any sound come out.    Still poor/ variable voice quality at times last week.     Her Botox treatment occurred on 8/27/19; #1 unit of Botox was received (0.5 into each thyroarytenoid muscle)    PROGRESS ON LONG-TERM GOALS: variable progress.      OBJECTIVE FINDINGS  PERCEPTUAL EVALUATION (CPT 19320)  POSTURE / TENSION:     neck and shoulders    BREATHING:     appears within normal limits and adequate     LARYNGEAL PALPATION: n/a    VOICE:    Roughness: Minimal    Breathiness: Mild to moderate    Strain: WNL    Loudness    Conversational speech:  Mildly reduced    Projected speech:  WNL    Pitch:    Conversational speech:  WNL    Pitch glide: WFL    Resonance:    Conversational speech:  laryngeal pharyngeal resonance    Singing vs. Speech:  N/a    CAPE-V Overall Severity:  25/100    Speech remains affected by a lisp, and today there were moments of phonatory breaks that I questioned to be associated with a possible tremor in the base of her tongue.    COUGH/THROAT CLEARING:    Not  "observed    LARYNGEAL FUNCTION STUDIES (CPT 28919)  Fundamental frequency Metrics     /a/ mean F0 = 176 Hz (SD = 8.58 Hz)     /i/ mean F0 = 177 Hz (SD = 4.71 Hz)     [place] Mean f0 = 178 Hz (SD 20.15 Hz)     Glide:         Min F0 = 122 Hz        Max F0 = 615 Hz        Range = 492 Hz        Notes = limited upper and lower range    Cepstral Measures     CPPS /a/ = 21.04 dB     CPPS /i/ = 19.54 dB     CPPS \"all voiced\" = 17.86 dB     AVQI (v.3.01) = 3.27    Additional Measures     Harmonic to Noise Ratio /a/ = 19.06 dB     Harmonic to Noise Ratio: [place] = 15.59 dB     Jitter (local) /a/ = 0.753 %     Shimmer (local) /a/ = 2.907 %    Aerodynamic Measures     Protocol / Parameter Result Normative Mean (SD)   Vital Capacity     Expiratory Volume 1.35 Liters 2.24 (0.6) Liters   Comfortable Sustained Phonation     Mean SPL 71.42 dB 79.89 (5.47) dB   Mean Pitch 183.62 Hz 185.27 (25.93) Hz   Peak Expiratory Airflow 0.745 Lit/Sec 0.17 (0.09) Lit/Sec   Mean Expiratory Airflow 0.328 Lit/Sec 0.1 (0.06) Lit/Sec   Voicing Efficiency     Peak Air Pressure -1 cm H2O 9.7 (5.12) cm H2O   Mean Peak Air Pressure -1 cm H2O 7.95 (4.28) cm H2O   Peak Expiratory Airflow 0.427 Lit/Sec 0.25 (0.19) Lit/Sec   Mean Airflow During Voicing 0.223 Lit/Sec 0.13 (0.07) Lit/Sec   Running Speech: All Voiced Stimulus     Mean SPL 60.67 dB    Mean Pitch 194.41 Hz    Peak Expiratory Airflow 0.719 Lit/Sec    Mean Expiratory Airflow 0.245 Lit/Sec    Mean Airflow During Voicing 0.253 Lit/Sec    Running Speech: [place] Passage     Mean SPL 61.19 dB    SPL Range 43.73 dB    Mean Pitch 185.55 Hz    Pitch Range 234.74 Hz    Peak Expiratory Airflow 0.954 Lit/Sec    Mean Expiratory Airflow 0.193 Lit/Sec    Expiratory Volume 4.26 Liters    Mean Airflow During Voicing 0.215 Lit/Sec    Peak Inspiratory Airflow -2.06 Lit/Sec    Inspiratory Volume -3.54 Liters        LARYNGEAL EXAMINATION  Not warranted today    IMPRESSIONS/ RECOMMENDATIONS/ " PLAN  IMPRESSIONS: [place] Nava is a 69 year old female, presenting today for follow-up with R49.0 (Dysphonia) and R25.1 (Tremor) in the context of a speech disturbance involving a lisp.  Today, she demonstrated a strong connection between her articulation and ability to optimally phonate.  In addition, she continues to have difficulties with her voice quality, and has experienced limited progress during her initial trial of Botox treatment.  We discussed her progress, and agreed that she has many questions that are difficult for me to assist with, considering my current limited involvement in her treatment.  If Botox were to be pursued again, I believe it would be helpful to work together to better address her progress and needs, especially since there is an additional speech component to her symptoms.  We discussed that she would be very welcome to return to [facility] afterwards, but again, agreed that I have not been able to provide as complete a service to her post-treatment questions, concerns and needs.  Ms. Nava was open to this idea of a course of therapy here, and mentioned that she had an appointmment with Ms. Oviedo later today.  I recommended that she contact Ms. Oviedo immediately, as it is very likely that her insurance would not cover a similar service provided on the same day.  I will let her decide how she would ultimately like to proceed with her care.    RECOMMENDATIONS:     An ongoing course of speech therapy is recommended to optimize vocal technique and improve voice quality.  It is currently recommended to pursue a course of treatment here, while pursuing her next trial of Botox , if she chooses to try the Botox treatment again with Dr. Pugh. I believe this would allow for better intervention and response to the nuances of Ms. Nava's voice and speech concerns.    She demonstrates a Good prognosis for improvement given adherence to therapeutic recommendations.     Positive  indicators: high level of comittment previously positive response to behavioral therapy    Negative indicators: limited current progress with initial Botox treatment; Botox tends to respond better to spasmodic dysphonia, than tremor.  Present lisp adds a level of complexity to Ms. Nava's symtoms.    DURATION / FREQUENCY: 2 one-hour sessions prior to next Botox treatment, and 2-3 appointments following her next treatment.    GOALS:  Patient goal:   1. To improve and maintain a healthy voice quality  2. To understand the problem and fix it as much as possible  3. To have a normal and acceptable voice quality    Short-term goal(s): Within the first 4 sessions, Ms. Nava:  1. will utilize silent inhalation with good low-respiratory engagement 75% of the time during therapy tasks with minimal clinician support  2. will accurately identify target vs. habitual voice quality during therapy tasks in 4 out of 5 trials with no clinician support  3. will demonstrate the ability to alternate between target and habitual voice quality given clinician cue 75% of the time during therapy tasks    Long-term goal(s): In 6 months, Ms. Nava will:  1. Report resolution of symptoms, and use of optimal voice quality and comfort to meet personal, social, and professional needs, 90% of the time during a typical week of vocal activities    Certification period:   Certification date from: 10/03/19  Certification date to: 1/1/20    This treatment plan was developed with the patient who agreed with the recommendations.    TOTAL SERVICE TIME: 60 minutes  EVALUATION OF VOICE AND RESONANCE (10460)  LARYNGEAL FUNCTION STUDIES (19165)  NO CHARGE FACILITY FEE (91698)    Aida Matthew M.M. (voice), M.A., CCC/SLP  Speech-Language Pathologist  Certificate of Vocology  [facility]  [phone]  [email or web address]  Prounouns: she/her

## 2019-10-11 NOTE — PROGRESS NOTES
Outpatient Speech Language Therapy Evaluation  PLAN OF TREATMENT FOR OUTPATIENT REHABILITATION  (COMPLETE FOR INITIAL CLAIMS ONLY)  Patient's Last Name, First Name, M.I.  YOB: 1950  [place] Nava                           Provider's Name  Aida Matthew, SLP Medical Record No.  [id number]                               Onset Date:  10/03/19   Start of Care Date: 10/03/19     Type: Speech Language Therapy Medical Diagnosis: No primary diagnosis found.                        Therapy Diagnosis:  No primary diagnosis found.   Visits from SOC:  1   _________________________________________________________________________________  Plan of Treatment:   Speech therapy    DURATION/FREQUENCY OF TREATMENT  Six weekly, one-hour sessions, with two monthly one-hour follow-up sessions    PROGNOSIS  Good prognosis for voice improvement with speech therapy and regular practice of therapeutic activities.    BARRIERS TO LEARNING/TEACHING AND LEARNING NEEDS  None/Unremarkable    GOALS:  Patient goal:   1. To improve and maintain a healthy voice quality  2. To understand the problem and fix it as much as possible  3. To have a normal and acceptable voice quality    Short-term goal(s): Within the first 4 sessions, Ms. Nava:  1. will utilize silent inhalation with good low-respiratory engagement 75% of the time during therapy tasks with minimal clinician support  2. will accurately identify target vs. habitual voice quality during therapy tasks in 4 out of 5 trials with no clinician support  3. will demonstrate the ability to alternate between target and habitual voice quality given clinician cue 75% of the time during therapy tasks    Long-term goal(s): In 6 months, Ms. Nava will:  1. Report resolution of symptoms, and use of optimal voice quality and comfort to meet personal, social, and professional needs, 90% of the time during a  typical week of vocal activities  _________________________________________________________________________________    I CERTIFY THE NEED FOR THESE SERVICES FURNISHED UNDER        THIS PLAN OF TREATMENT AND WHILE UNDER MY CARE     (Physician attestation of this document indicates review and certification of the therapy plan).     Certification date from: 10/03/19  Certification date to: 1/1/20    Referring Provider: Jose D Pugh

## 2019-10-25 ENCOUNTER — COMMUNICATION - HEALTHEAST (OUTPATIENT)
Dept: CARDIOLOGY | Facility: CLINIC | Age: 69
End: 2019-10-25

## 2019-10-28 ENCOUNTER — AMBULATORY - HEALTHEAST (OUTPATIENT)
Dept: CARDIOLOGY | Facility: CLINIC | Age: 69
End: 2019-10-28

## 2019-10-28 DIAGNOSIS — I48.21 PERMANENT ATRIAL FIBRILLATION (H): ICD-10-CM

## 2019-10-28 LAB
ATRIAL RATE - MUSE: 60 BPM
DIASTOLIC BLOOD PRESSURE - MUSE: NORMAL
INTERPRETATION ECG - MUSE: NORMAL
P AXIS - MUSE: 81 DEGREES
PR INTERVAL - MUSE: 124 MS
QRS DURATION - MUSE: 80 MS
QT - MUSE: 432 MS
QTC - MUSE: 432 MS
R AXIS - MUSE: 93 DEGREES
SYSTOLIC BLOOD PRESSURE - MUSE: NORMAL
T AXIS - MUSE: 37 DEGREES
VENTRICULAR RATE- MUSE: 60 BPM

## 2019-10-28 ASSESSMENT — MIFFLIN-ST. JEOR: SCORE: 1125.49

## 2019-10-31 ENCOUNTER — AMBULATORY - HEALTHEAST (OUTPATIENT)
Dept: CARDIOLOGY | Facility: CLINIC | Age: 69
End: 2019-10-31

## 2019-10-31 ENCOUNTER — OFFICE VISIT - HEALTHEAST (OUTPATIENT)
Dept: CARDIOLOGY | Facility: CLINIC | Age: 69
End: 2019-10-31

## 2019-10-31 DIAGNOSIS — Z82.49 FAMILY HISTORY OF ABDOMINAL AORTIC ANEURYSM (AAA): ICD-10-CM

## 2019-10-31 DIAGNOSIS — E78.5 DYSLIPIDEMIA: ICD-10-CM

## 2019-10-31 ASSESSMENT — MIFFLIN-ST. JEOR: SCORE: 1120.5

## 2019-12-11 ENCOUNTER — HOSPITAL ENCOUNTER (OUTPATIENT)
Dept: ULTRASOUND IMAGING | Facility: CLINIC | Age: 69
Discharge: HOME | End: 2019-12-11
Attending: INTERNAL MEDICINE

## 2019-12-11 ENCOUNTER — HOSPITAL ENCOUNTER (OUTPATIENT)
Dept: CT IMAGING | Facility: CLINIC | Age: 69
Discharge: HOME | End: 2019-12-11
Attending: INTERNAL MEDICINE

## 2019-12-11 DIAGNOSIS — E78.5 DYSLIPIDEMIA: ICD-10-CM

## 2019-12-11 DIAGNOSIS — Z82.49 FAMILY HISTORY OF ABDOMINAL AORTIC ANEURYSM (AAA): ICD-10-CM

## 2019-12-11 LAB
CV CALCIUM SCORE AGATSTON LM: 0
CV CALCIUM SCORING AGATSON LAD: 0
CV CALCIUM SCORING AGATSTON CX: 0
CV CALCIUM SCORING AGATSTON RCA: 0
CV CALCIUM SCORING AGATSTON TOTAL: 0

## 2019-12-13 ENCOUNTER — COMMUNICATION - HEALTHEAST (OUTPATIENT)
Dept: CARDIOLOGY | Facility: CLINIC | Age: 69
End: 2019-12-13

## 2019-12-15 ENCOUNTER — COMMUNICATION - HEALTHEAST (OUTPATIENT)
Dept: CARDIOLOGY | Facility: CLINIC | Age: 69
End: 2019-12-15

## 2020-01-27 ENCOUNTER — DOCUMENTATION ONLY (OUTPATIENT)
Dept: CARE COORDINATION | Facility: CLINIC | Age: 70
End: 2020-01-27

## 2020-01-28 ENCOUNTER — OFFICE VISIT (OUTPATIENT)
Dept: OTOLARYNGOLOGY | Facility: CLINIC | Age: 70
End: 2020-01-28
Payer: MEDICARE

## 2020-01-28 VITALS
BODY MASS INDEX: 22.47 KG/M2 | DIASTOLIC BLOOD PRESSURE: 83 MMHG | HEART RATE: 84 BPM | HEIGHT: 65 IN | SYSTOLIC BLOOD PRESSURE: 129 MMHG

## 2020-01-28 DIAGNOSIS — J38.3 OTHER DISEASES OF VOCAL CORDS: Primary | ICD-10-CM

## 2020-01-28 DIAGNOSIS — J38.5 LARYNGEAL SPASM: ICD-10-CM

## 2020-01-28 ASSESSMENT — PAIN SCALES - GENERAL: PAINLEVEL: NO PAIN (0)

## 2020-01-28 NOTE — LETTER
1/28/2020     RE: Florida Nava  [address] [place] [address]     Dear Colleague,    Thank you for referring your patient, [place] Nava, to the [facility] EAR NOSE [facility] THROAT at [facility]. Please see a copy of my visit note below.    [place] Nava is a 69 year old female with a history of adductor laryngeal dystonia and laryngeal spasm.  she was last injected on 8/27/2019. she had a good response to the last injection. The last dose given was 0.5 units into each thyroarytenoid muscle.  After the injection  she had a breathy dysphonia for 3 weeks.There was no Dysphagia or Dyspnea.  The response lasted for 5 months.   Our plan is to give 0.25 units of Botulinum A toxin into  each thyroarytenoid muscle today.      PROCEDURE: After obtaining consent, the patient was placed in the supine position. Ground and reference electrodes were applied. The anterior neck was cleaned with an alcohol swab.  Using a 27-gauge, unipolar electromyography needle, the larynx was entered through the cricothyroid space.  0.25 units of botulinum A toxin was injected into each thyroarytenoid muscle.  There was a Strong EMG response to phonation on the left side and a Strong EMG response to phonation on the right side.  The total amount of botulinum A toxin delivered today was 0.5 units. An additional 5 units of botulinum A toxin was necessarily wasted in preparation for the injection. she tolerated the procedure well and left the clinic after a short observation period.       The EMG was necessary specifically in this case to identify areas of muscle overactivity as well as to access the thyroarytenoid muscle which is beneath the thyroid cartilage and is not otherwise directly accessible without EMG guidance.    PLAN: I will have her  follow up on a PRN basis.    Again, thank you for allowing me to participate in the care of your patient.      Sincerely,    Jose D Pugh MD

## 2020-01-28 NOTE — PROGRESS NOTES
[place] Nava is a 69 year old female with a history of adductor laryngeal dystonia and laryngeal spasm.  she was last injected on 8/27/2019. she had a good response to the last injection. The last dose given was 0.5 units into each thyroarytenoid muscle.  After the injection  she had a breathy dysphonia for 3 weeks.There was no Dysphagia or Dyspnea.  The response lasted for 5 months.   Our plan is to give 0.25 units of Botulinum A toxin into  each thyroarytenoid muscle today.      PROCEDURE: After obtaining consent, the patient was placed in the supine position. Ground and reference electrodes were applied. The anterior neck was cleaned with an alcohol swab.  Using a 27-gauge, unipolar electromyography needle, the larynx was entered through the cricothyroid space.  0.25 units of botulinum A toxin was injected into each thyroarytenoid muscle.  There was a Strong EMG response to phonation on the left side and a Strong EMG response to phonation on the right side.  The total amount of botulinum A toxin delivered today was 0.5 units. An additional 5 units of botulinum A toxin was necessarily wasted in preparation for the injection. she tolerated the procedure well and left the clinic after a short observation period.         The EMG was necessary specifically in this case to identify areas of muscle overactivity as well as to access the thyroarytenoid muscle which is beneath the thyroid cartilage and is not otherwise directly accessible without EMG guidance.    PLAN: I will have her  follow up on a PRN basis.

## 2020-02-18 ENCOUNTER — RECORDS - HEALTHEAST (OUTPATIENT)
Dept: ADMINISTRATIVE | Facility: OTHER | Age: 70
End: 2020-02-18

## 2020-03-11 ENCOUNTER — HEALTH MAINTENANCE LETTER (OUTPATIENT)
Age: 70
End: 2020-03-11

## 2020-04-10 ENCOUNTER — COMMUNICATION - HEALTHEAST (OUTPATIENT)
Dept: CARDIOLOGY | Facility: CLINIC | Age: 70
End: 2020-04-10

## 2020-04-29 ENCOUNTER — RECORDS - HEALTHEAST (OUTPATIENT)
Dept: LAB | Facility: CLINIC | Age: 70
End: 2020-04-29

## 2020-04-29 LAB
ANION GAP SERPL CALCULATED.3IONS-SCNC: 10 MMOL/L (ref 5–18)
BUN SERPL-MCNC: 16 MG/DL (ref 8–28)
CALCIUM SERPL-MCNC: 9.5 MG/DL (ref 8.5–10.5)
CHLORIDE BLD-SCNC: 104 MMOL/L (ref 98–107)
CHOLEST SERPL-MCNC: 257 MG/DL
CO2 SERPL-SCNC: 27 MMOL/L (ref 22–31)
CREAT SERPL-MCNC: 0.84 MG/DL (ref 0.6–1.1)
FASTING STATUS PATIENT QL REPORTED: YES
GFR SERPL CREATININE-BSD FRML MDRD: >60 ML/MIN/1.73M2
GLUCOSE BLD-MCNC: 90 MG/DL (ref 70–125)
HDLC SERPL-MCNC: 71 MG/DL
LDLC SERPL CALC-MCNC: 167 MG/DL
POTASSIUM BLD-SCNC: 4.1 MMOL/L (ref 3.5–5)
SODIUM SERPL-SCNC: 141 MMOL/L (ref 136–145)
TRIGL SERPL-MCNC: 93 MG/DL

## 2020-05-01 ENCOUNTER — AMBULATORY - HEALTHEAST (OUTPATIENT)
Dept: CARDIOLOGY | Facility: CLINIC | Age: 70
End: 2020-05-01

## 2020-05-06 ENCOUNTER — COMMUNICATION - HEALTHEAST (OUTPATIENT)
Dept: CARDIOLOGY | Facility: CLINIC | Age: 70
End: 2020-05-06

## 2020-05-20 ENCOUNTER — RECORDS - HEALTHEAST (OUTPATIENT)
Dept: ADMINISTRATIVE | Facility: OTHER | Age: 70
End: 2020-05-20

## 2020-06-09 ENCOUNTER — TELEPHONE (OUTPATIENT)
Dept: NEUROLOGY | Facility: CLINIC | Age: 70
End: 2020-06-09

## 2020-07-13 ENCOUNTER — HOSPITAL ENCOUNTER (OUTPATIENT)
Dept: MAMMOGRAPHY | Facility: CLINIC | Age: 70
Discharge: HOME | End: 2020-07-13
Attending: FAMILY MEDICINE

## 2020-07-13 DIAGNOSIS — Z12.31 VISIT FOR SCREENING MAMMOGRAM: ICD-10-CM

## 2020-07-16 ENCOUNTER — COMMUNICATION - HEALTHEAST (OUTPATIENT)
Dept: CARDIOLOGY | Facility: CLINIC | Age: 70
End: 2020-07-16

## 2020-07-28 ENCOUNTER — COMMUNICATION - HEALTHEAST (OUTPATIENT)
Dept: CARDIOLOGY | Facility: CLINIC | Age: 70
End: 2020-07-28

## 2020-07-29 ENCOUNTER — OFFICE VISIT - HEALTHEAST (OUTPATIENT)
Dept: CARDIOLOGY | Facility: CLINIC | Age: 70
End: 2020-07-29

## 2020-07-29 DIAGNOSIS — I48.0 PAROXYSMAL ATRIAL FIBRILLATION (H): ICD-10-CM

## 2020-07-29 DIAGNOSIS — E78.00 ELEVATED LDL CHOLESTEROL LEVEL: ICD-10-CM

## 2020-07-29 DIAGNOSIS — I48.19 PERSISTENT ATRIAL FIBRILLATION (H): ICD-10-CM

## 2020-07-30 LAB
ATRIAL RATE - MUSE: 77 BPM
DIASTOLIC BLOOD PRESSURE - MUSE: NORMAL
INTERPRETATION ECG - MUSE: NORMAL
P AXIS - MUSE: 71 DEGREES
PR INTERVAL - MUSE: 102 MS
QRS DURATION - MUSE: 80 MS
QT - MUSE: 366 MS
QTC - MUSE: 414 MS
R AXIS - MUSE: 64 DEGREES
SYSTOLIC BLOOD PRESSURE - MUSE: NORMAL
T AXIS - MUSE: 27 DEGREES
VENTRICULAR RATE- MUSE: 77 BPM

## 2020-08-06 ENCOUNTER — COMMUNICATION - HEALTHEAST (OUTPATIENT)
Dept: CARDIOLOGY | Facility: CLINIC | Age: 70
End: 2020-08-06

## 2020-08-06 DIAGNOSIS — I48.19 PERSISTENT ATRIAL FIBRILLATION (H): ICD-10-CM

## 2021-01-03 ENCOUNTER — HEALTH MAINTENANCE LETTER (OUTPATIENT)
Age: 71
End: 2021-01-03

## 2021-02-15 DIAGNOSIS — R25.9 ABNORMAL INVOLUNTARY MOVEMENT: Primary | ICD-10-CM

## 2021-02-15 RX ORDER — CLONAZEPAM 0.5 MG/1
TABLET ORAL
Qty: 270 TABLET | Refills: 0 | Status: SHIPPED | OUTPATIENT
Start: 2021-02-15 | End: 2021-06-07

## 2021-02-15 NOTE — TELEPHONE ENCOUNTER
Refill request for clonazepam.  Pt is scheduled for upcoming appt on 3/11/21.  Medication T'd for review and signature  Sarina Masters LPN on 2/15/2021 at 11:10 AM

## 2021-02-15 NOTE — TELEPHONE ENCOUNTER
Pt called stating that Jack is waiting on a refill request for Clonazepam. She will run out soon. Fax # [phone]. Call pt if questions [phone]

## 2021-02-19 ENCOUNTER — RECORDS - HEALTHEAST (OUTPATIENT)
Dept: ADMINISTRATIVE | Facility: OTHER | Age: 71
End: 2021-02-19

## 2021-02-25 ENCOUNTER — RECORDS - HEALTHEAST (OUTPATIENT)
Dept: ADMINISTRATIVE | Facility: OTHER | Age: 71
End: 2021-02-25

## 2021-03-01 ENCOUNTER — OFFICE VISIT - HEALTHEAST (OUTPATIENT)
Dept: CARDIOLOGY | Facility: CLINIC | Age: 71
End: 2021-03-01

## 2021-03-01 DIAGNOSIS — I48.19 PERSISTENT ATRIAL FIBRILLATION (H): ICD-10-CM

## 2021-03-01 ASSESSMENT — MIFFLIN-ST. JEOR: SCORE: 1106.89

## 2021-03-11 ENCOUNTER — RECORDS - HEALTHEAST (OUTPATIENT)
Dept: SCHEDULING | Facility: CLINIC | Age: 71
End: 2021-03-11

## 2021-03-11 ENCOUNTER — OFFICE VISIT (OUTPATIENT)
Dept: NEUROLOGY | Facility: CLINIC | Age: 71
End: 2021-03-11
Payer: COMMERCIAL

## 2021-03-11 ENCOUNTER — RECORDS - HEALTHEAST (OUTPATIENT)
Dept: ADMINISTRATIVE | Facility: OTHER | Age: 71
End: 2021-03-11

## 2021-03-11 VITALS
SYSTOLIC BLOOD PRESSURE: 120 MMHG | HEART RATE: 68 BPM | BODY MASS INDEX: 23.01 KG/M2 | HEIGHT: 64 IN | DIASTOLIC BLOOD PRESSURE: 76 MMHG | WEIGHT: 134.8 LBS

## 2021-03-11 DIAGNOSIS — G25.0 ESSENTIAL TREMOR: ICD-10-CM

## 2021-03-11 DIAGNOSIS — R25.9 ABNORMAL INVOLUNTARY MOVEMENT: ICD-10-CM

## 2021-03-11 DIAGNOSIS — G25.0 ESSENTIAL TREMOR: Primary | ICD-10-CM

## 2021-03-11 PROCEDURE — 99213 OFFICE O/P EST LOW 20 MIN: CPT | Mod: TEL | Performed by: PSYCHIATRY & NEUROLOGY

## 2021-03-11 RX ORDER — CHLORAL HYDRATE 500 MG
1 CAPSULE ORAL DAILY
COMMUNITY

## 2021-03-11 RX ORDER — MULTIVITAMIN,THERAPEUTIC
1 TABLET ORAL DAILY
COMMUNITY
End: 2022-05-11

## 2021-03-11 RX ORDER — CALCIUM CARBONATE 300MG(750)
400 TABLET,CHEWABLE ORAL DAILY
COMMUNITY

## 2021-03-11 ASSESSMENT — MIFFLIN-ST. JEOR: SCORE: 1106.45

## 2021-03-11 NOTE — NURSING NOTE
Room 7  Chief Complaint   Patient presents with     Tremors     Follow up.     Carrie Montilla RN on 3/11/2021 at 9:23 AM

## 2021-03-11 NOTE — PATIENT INSTRUCTIONS
Would see Dr Pugh  Regarding botox for voice and see if improves.   Will continue same dose of clonazepam. Have put in order for MRI regarding increased tremor on left.   Follow up in 1 year.

## 2021-03-11 NOTE — LETTER
3/11/2021         RE: Florida Nava  [address] [place] [address]        Dear Colleague,    Thank you for referring your patient, [place] Nava, to the [facility] NEUROLOGY [facility]. Please see a copy of my visit note below.      Assessment & Plan     Essential tremor-her tremor in general appears to be the same in her arms that is slightly worse than the left as compared to the right.  I would consider using the same amount of clonazepam.  We did discuss doing an MRI scan of the brain to make sure that there is no new process going on that could be causing some of the tremor to worsen, though typically tremor will get worse as 1 gets older.  She will think about that possibility.  In terms of her voice tremor, she is not sure whether the botulinum toxin therapy had helped or not.  I think it would be reasonable to retry Botox again, with having her do a verbal reading before having the injection, with and then monthly after the injection to see if there is a clear benefit that she can record for herself.  We will otherwise plan follow-up in 1 years time.    Review of external notes as documented elsewhere in note  25 minutes spent on the date of the encounter doing chart review, history and exam, documentation and further activities as noted above           Return in about 1 year (around 3/11/2022).    Jovanny Lewis MD, MD  [facility] NEUROLOGY [facility]    Ami Bartholomew is a 70 year old who presents for the following health issues essential tremor  HPI       I saw [place] Nava in the office today in follow-up of essential tremor.  When I had seen her a little over a year ago, she did have another treatment with botulinum toxin with Dr. Pugh at the [facility].  I did read his notes about what he had found a year ago.  She is not sure if there was significant improvement or not in her voice with the use of botulinum toxin.  She does note that there  "is a slight worsening of her hand tremor at this time on the left side versus the right.  She has not had any double vision or loss of vision.  No abrupt change in voice.  She has not noticed any focal weakness or numbness problems.  She continues with clonazepam 0.5 mg in the morning and 1 mg at night.  She has not had any new medical problems occur.  She reports that she is stable from the standpoint of her atrial fibrillation.    Review of Systems   See above.      Objective    /76   Pulse 68   Ht 1.626 m (5' 4\")   Wt 61.1 kg (134 lb 12.8 oz)   BMI 23.14 kg/m    Body mass index is 23.14 kg/m .  Physical Exam   Alert oriented x3.  Speech-does have a tremor to her voice which appears unchanged as compared to a year ago.  Extraocular movements are full.  Visual fields are full.  Face moves symmetrically.  Tongue midline.  Motor strength appeared 5/5.  Tone was normal.  Good opening and closing of hands.  Does have a positional tremor which is greater on the left as compared to the right.  Babinski downgoing.  Reflexes were symmetric.  Gait was normal.      Again, thank you for allowing me to participate in the care of your patient.        Sincerely,        Jovanny Lewis MD, MD    "

## 2021-03-11 NOTE — PROGRESS NOTES
Assessment & Plan     Essential tremor-her tremor in general appears to be the same in her arms that is slightly worse than the left as compared to the right.  I would consider using the same amount of clonazepam.  We did discuss doing an MRI scan of the brain to make sure that there is no new process going on that could be causing some of the tremor to worsen, though typically tremor will get worse as 1 gets older.  She will think about that possibility.  In terms of her voice tremor, she is not sure whether the botulinum toxin therapy had helped or not.  I think it would be reasonable to retry Botox again, with having her do a verbal reading before having the injection, with and then monthly after the injection to see if there is a clear benefit that she can record for herself.  We will otherwise plan follow-up in 1 years time.    Review of external notes as documented elsewhere in note  25 minutes spent on the date of the encounter doing chart review, history and exam, documentation and further activities as noted above           Return in about 1 year (around 3/11/2022).    Jovanny Lewis MD, MD  [facility] NEUROLOGY CLINIC [place]    Ami Bartholomew is a 70 year old who presents for the following health issues essential tremor  HPI       I saw [place] Nava in the office today in follow-up of essential tremor.  When I had seen her a little over a year ago, she did have another treatment with botulinum toxin with Dr. Pugh at the [facility].  I did read his notes about what he had found a year ago.  She is not sure if there was significant improvement or not in her voice with the use of botulinum toxin.  She does note that there is a slight worsening of her hand tremor at this time on the left side versus the right.  She has not had any double vision or loss of vision.  No abrupt change in voice.  She has not noticed any focal weakness or numbness problems.  She continues with  "clonazepam 0.5 mg in the morning and 1 mg at night.  She has not had any new medical problems occur.  She reports that she is stable from the standpoint of her atrial fibrillation.    Review of Systems   See above.      Objective    /76   Pulse 68   Ht 1.626 m (5' 4\")   Wt 61.1 kg (134 lb 12.8 oz)   BMI 23.14 kg/m    Body mass index is 23.14 kg/m .  Physical Exam   Alert oriented x3.  Speech-does have a tremor to her voice which appears unchanged as compared to a year ago.  Extraocular movements are full.  Visual fields are full.  Face moves symmetrically.  Tongue midline.  Motor strength appeared 5/5.  Tone was normal.  Good opening and closing of hands.  Does have a positional tremor which is greater on the left as compared to the right.  Babinski downgoing.  Reflexes were symmetric.  Gait was normal.  "

## 2021-04-07 ENCOUNTER — COMMUNICATION - HEALTHEAST (OUTPATIENT)
Dept: CARDIOLOGY | Facility: CLINIC | Age: 71
End: 2021-04-07

## 2021-04-13 ENCOUNTER — DOCUMENTATION ONLY (OUTPATIENT)
Dept: CARE COORDINATION | Facility: CLINIC | Age: 71
End: 2021-04-13

## 2021-04-25 ENCOUNTER — HEALTH MAINTENANCE LETTER (OUTPATIENT)
Age: 71
End: 2021-04-25

## 2021-05-03 DIAGNOSIS — J38.5 LARYNGEAL SPASM: Primary | ICD-10-CM

## 2021-05-25 ENCOUNTER — RECORDS - HEALTHEAST (OUTPATIENT)
Dept: ADMINISTRATIVE | Facility: CLINIC | Age: 71
End: 2021-05-25

## 2021-05-26 ENCOUNTER — RECORDS - HEALTHEAST (OUTPATIENT)
Dept: ADMINISTRATIVE | Facility: CLINIC | Age: 71
End: 2021-05-26

## 2021-05-27 ENCOUNTER — RECORDS - HEALTHEAST (OUTPATIENT)
Dept: ADMINISTRATIVE | Facility: CLINIC | Age: 71
End: 2021-05-27

## 2021-05-28 ENCOUNTER — RECORDS - HEALTHEAST (OUTPATIENT)
Dept: ADMINISTRATIVE | Facility: CLINIC | Age: 71
End: 2021-05-28

## 2021-05-28 NOTE — PROGRESS NOTES
[facility] Progress Note    Assessment:  1.  Atrial fibrillation with successful ablation January 2018.  She has not had any specific symptoms.  She has been on Eliquis with a chads 2 Vascor of 2-3.  We spent some time today reviewing anticoagulant strategies versus the watchman device.  At this point her preference is not to consider watchman device.  She is going to visit with hematology to get some further input as to whether the DVT and subsequent PE was considered to be provoked or unprovoked in 2017.  We did talk about the risk of bleeding versus the risk of stroke and reviewed the chads 2 Vascor.    2.  Elevated LDL cholesterol.  We spoke about this at some length.  We talked about risk benefit ratio.  We talked about consideration of coronary calcium scoring which I did review with her.  We talked about the downsides to taking statin medications.  At this point she is not in favor of being on a statin will consider the possibility of having a coronary calcium score.  I did calculate her 10-year risk which is elevated.      Plan: As outlined above with follow-up in 6 months.    1. Persistent atrial fibrillation (H)     2. Elevated LDL cholesterol level           An After Visit Summary was printed and given to the patient.    Subjective:    [place] Nava is a 68 y.o. female who returned for a planned  follow up visit.  She reports feeling well.  She specifically denies palpitation, shortness of breath, dizziness or chest discomfort.  She has a history of DVT and pulmonary embolism with atrial fibrillation presenting May 2017.  She had been placed on Eliquis.  The DVT occurred after some relatively brief travel and she in fact will be seeing hematology tomorrow to get additional insight.  She did have recurrence of atrial fibrillation and subsequently underwent ablation in January 2018.  She did come off amiodarone and had describes some increased palpitations which she states are improved.   In the interim she has not been aware of any significant palpitations.  She was seen in the atrial fibrillation clinic in December 2018.    We reviewed the anticoagulant options as well as watchman device.  I did give her a pamphlet as a relates to the watchman device.  In addition I reviewed her recent laboratory studies where she does have an elevated LDL cholesterol of 172 and with a good HDL.  She has been of the opinion in the past and again today that she would not consider statins.  We did discuss the possibility of having a coronary calcium score to further define her potential risk.    Review of Systems:   General: WNL  Eyes: WNL  Ears/Nose/Throat: WNL  Lungs: WNL  Heart: WNL  Stomach: WNL  Bladder: WNL  Muscle/Joints: WNL  Skin: WNL  Nervous System: WNL  Mental Health: WNL     Blood: WNL      Problem List:    Patient Active Problem List   Diagnosis     Varicose Veins     (Lower) Leg Localized Swelling Bilateral     Diverticulosis     Osteopenia     Hyperlipidemia     Familial (Benign Essential) Tremor     Essential hypertension     Bunion     Basal cell carcinoma of skin     Family history of colon cancer     Hallux rigidus     Hammer toe     Pulmonary embolism (H)     Persistent atrial fibrillation (H)     Elevated LDL cholesterol level       Social History     Socioeconomic History     Marital status: Single     Spouse name: Not on file     Number of children: Not on file     Years of education: Not on file     Highest education level: Not on file   Occupational History     Occupation: retired     Comment:    Social Needs     Financial resource strain: Not on file     Food insecurity:     Worry: Not on file     Inability: Not on file     Transportation needs:     Medical: Not on file     Non-medical: Not on file   Tobacco Use     Smoking status: Never Smoker     Smokeless tobacco: Never Used   Substance and Sexual Activity     Alcohol use: Yes     Alcohol/week: 0.6 oz     Types: 1 Glasses  of wine per week     Comment: occasional     Drug use: No     Sexual activity: Not on file     Comment: single    Lifestyle     Physical activity:     Days per week: Not on file     Minutes per session: Not on file     Stress: Not on file   Relationships     Social connections:     Talks on phone: Not on file     Gets together: Not on file     Attends Yarsani service: Not on file     Active member of club or organization: Not on file     Attends meetings of clubs or organizations: Not on file     Relationship status: Not on file     Intimate partner violence:     Fear of current or ex partner: Not on file     Emotionally abused: Not on file     Physically abused: Not on file     Forced sexual activity: Not on file   Other Topics Concern     Not on file   Social History Narrative     Not on file       Family History   Problem Relation Age of Onset     Hypertension Mother      Colon cancer Sister      No Medical Problems Father      Atrial fibrillation Brother      Pacemaker Brother      No Medical Problems Sister      Prostate cancer Brother        Current Outpatient Medications   Medication Sig Dispense Refill     APIXABAN (ELIQUIS ORAL) Take 5 mg by mouth 2 (two) times a day.        calcium carbonate (CALCIUM 500 ORAL) Take 1,000 mg by mouth daily.       clonazePAM (KLONOPIN) 0.5 MG tablet Take 0.5 mg by mouth 2 (two) times a day as needed.        ergocalciferol (VITAMIN D2) 50,000 unit capsule Take 50,000 Units by mouth once a week.       Lactobacillus rhamnosus GG (CULTURELLE) 10-15 Billion cell capsule Take 1 capsule by mouth 2 (two) times a day with meals.        magnesium glycinate 100 mg Tab Take 200 mg by mouth at bedtime. Leg cramps        melatonin 5 mg Tab tablet Take 2.5 mg by mouth at bedtime as needed (sleep).              metoprolol succinate (TOPROL XL) 25 MG Take 1 tablet (25 mg total) by mouth daily. 90 tablet 1     multivitamin therapeutic (THERAGRAN) tablet Take 1 tablet by mouth daily.         "OMEGA-3/DHA/EPA/FISH OIL (FISH OIL-OMEGA-3 FATTY ACIDS) 300-1,000 mg capsule Take 1 g by mouth daily.        TURMERIC ROOT EXTRACT ORAL Take 1 capsule by mouth 2 (two) times a week.       No current facility-administered medications for this visit.      Facility-Administered Medications Ordered in Other Visits   Medication Dose Route Frequency Provider Last Rate Last Dose     sodium chloride 0.9%    Continuous PRN Kimberley Alcaraz CRNA           Objective:     /82 (Patient Site: Right Arm, Patient Position: Sitting, Cuff Size: Adult Regular)   Pulse 84   Resp 16   Ht 5' 4\" (1.626 m)   Wt 135 lb (61.2 kg)   BMI 23.17 kg/m    135 lb (61.2 kg)       Physical Exam:    GENERAL APPEARANCE: alert, no apparent distress  HEENT: no scleral icterus or xanthelasma  NECK: jugular venous pressure within normal limits  CHEST: symmetric, the lungs are clear to auscultation  CARDIOVASCULAR: regular rhythm without murmur, click, or gallop; no carotid bruits  Abdomen: No Organomegaly, masses, bruits, or tenderness. Bowels sounds are present      EXTREMITIES: no cyanosis, clubbing or edema    Cardiac Testing:  Reading physician: Bear Wheeler MD Ordering physician: Yung Smyth MD Study date: 18   Performing Date Performing Department   Aug 9, 2018  CARDIAC TESTING [[id number]]   Patient Information     Patient Name  [place] Nava MRN  [id number] Sex  Female              Age  1950 (68 y.o.)   Indications     Dx: Paroxysmal atrial fibrillation (H) [I48.0 (ICD-10-CM)]   Summary       When compared to the previous study dated 2017, no significant change.    Left ventricle ejection fraction is normal. The calculated left ventricular ejection fraction is 61%.    Moderate left atrial enlargement    Normal right ventricular size and systolic function.    No hemodynamically significant valvular heart abnormalities     Impression:    Abnormal Holter monitor tracing by virtue of the presence of " "sustained atrial fibrillation accounting for almost half of the 24 hour recording.    Ventricular response during atrial fibrillation is elevated    The patient did not report symptoms therefore the atrial fibrillation would appear to be \"silent\".    No sustained ventricular tachyarrhythmia.    No profound bradycardia or pauses        Comment: The recording was for 23 hours and 59 minute.  The recording quality was adequate      Lab Results:    Lab Results   Component Value Date     04/23/2019    K 4.1 04/23/2019     04/23/2019    CO2 29 04/23/2019    BUN 13 04/23/2019    CREATININE 0.78 04/23/2019    CALCIUM 9.6 04/23/2019     Lab Results   Component Value Date    CHOL 250 (H) 04/23/2019    TRIG 68 04/23/2019    HDL 64 04/23/2019     BNP (pg/mL)   Date Value   05/26/2017 662 (H)     Creatinine (mg/dL)   Date Value   04/23/2019 0.78   01/03/2018 0.95   10/30/2017 1.07   07/14/2017 1.28 (H)     LDL Calculated (mg/dL)   Date Value   04/23/2019 172 (H)   06/16/2017 134 (H)   03/17/2014 174 (H)     Lab Results   Component Value Date    WBC 5.7 01/03/2018    HGB 15.5 01/03/2018    HCT 46.5 01/03/2018    MCV 95 01/03/2018     01/03/2018               This note has been dictated using voice recognition software. Any grammatical or context distortions are unintentional and inherent to the software.      Yung Smyth M.D., F.A.C.C.  [facility]  [phone]                "

## 2021-05-28 NOTE — PATIENT INSTRUCTIONS - HE
Please call my nurse Mandie with any heart related questions.We talked about a coronary calcium ct score.Please call with any questions.Her number is [phone].We also talked about the Watchman device.

## 2021-05-29 ENCOUNTER — RECORDS - HEALTHEAST (OUTPATIENT)
Dept: ADMINISTRATIVE | Facility: CLINIC | Age: 71
End: 2021-05-29

## 2021-05-30 ENCOUNTER — RECORDS - HEALTHEAST (OUTPATIENT)
Dept: ADMINISTRATIVE | Facility: CLINIC | Age: 71
End: 2021-05-30

## 2021-05-31 VITALS — WEIGHT: 130 LBS | HEIGHT: 64 IN | BODY MASS INDEX: 22.2 KG/M2

## 2021-05-31 VITALS — BODY MASS INDEX: 23.56 KG/M2 | WEIGHT: 138 LBS | HEIGHT: 64 IN

## 2021-05-31 VITALS — HEIGHT: 64 IN | BODY MASS INDEX: 22.71 KG/M2 | WEIGHT: 133 LBS

## 2021-05-31 VITALS — BODY MASS INDEX: 24.11 KG/M2 | WEIGHT: 141.2 LBS | HEIGHT: 64 IN

## 2021-05-31 VITALS — BODY MASS INDEX: 24.69 KG/M2 | WEIGHT: 144.6 LBS | HEIGHT: 64 IN

## 2021-05-31 VITALS — WEIGHT: 138.7 LBS | HEIGHT: 64 IN | BODY MASS INDEX: 23.68 KG/M2

## 2021-05-31 VITALS — WEIGHT: 144 LBS | HEIGHT: 64 IN | BODY MASS INDEX: 24.59 KG/M2

## 2021-05-31 VITALS — HEIGHT: 64 IN | WEIGHT: 133 LBS | BODY MASS INDEX: 22.71 KG/M2

## 2021-05-31 VITALS — BODY MASS INDEX: 24.92 KG/M2 | WEIGHT: 146 LBS | HEIGHT: 64 IN

## 2021-06-01 VITALS — HEIGHT: 64 IN | WEIGHT: 147.8 LBS | BODY MASS INDEX: 25.23 KG/M2

## 2021-06-01 VITALS — HEIGHT: 64 IN | BODY MASS INDEX: 24.24 KG/M2 | WEIGHT: 142 LBS

## 2021-06-01 VITALS — BODY MASS INDEX: 24.24 KG/M2 | HEIGHT: 64 IN | WEIGHT: 142 LBS

## 2021-06-01 VITALS — BODY MASS INDEX: 24.21 KG/M2 | WEIGHT: 141.8 LBS | HEIGHT: 64 IN

## 2021-06-01 VITALS — HEIGHT: 64 IN | WEIGHT: 146 LBS | BODY MASS INDEX: 24.92 KG/M2

## 2021-06-02 ENCOUNTER — RECORDS - HEALTHEAST (OUTPATIENT)
Dept: EMERGENCY MEDICINE | Facility: CLINIC | Age: 71
End: 2021-06-02

## 2021-06-02 VITALS — BODY MASS INDEX: 24.53 KG/M2 | HEIGHT: 64 IN | WEIGHT: 143.7 LBS

## 2021-06-02 VITALS — HEIGHT: 64 IN | BODY MASS INDEX: 23.05 KG/M2 | WEIGHT: 135 LBS

## 2021-06-02 NOTE — TELEPHONE ENCOUNTER
Zestar Study Consent Visit    Study description: ECG and PPG Study: Zestar Study      [place] Nava a 69 y.o. female , was contacted by today to discuss participation in the Zestar study.     The patient called the Clinical Trials Office to inquire about study participation.  The consent form was reviewed with the patient.     The review of the study included:    Study purpose     Conflict of interest    Device description      Study visits    Risks of participation    Benefits (if any)    Alternatives    Voluntary participation    Confidentiality     Compensation/costs of participation    Study stipends    Injury and legal rights    The subject was queried in regards to her willingness to continue and come in for scheduled appointment. her questions were answered to her satisfaction.   The patient has given her preliminary agreement to volunteer to participate in the above noted study.   Her medical history and medications were reviewed with her to determine her potential eligibility to participate in the trial.      Plan: [place] Nava will come to [facility] on 10.28.2019 to continue consent process. If she continues to agrees to participate, the study visit will be done on the same day.  she was instructed to eat as usual before coming for study visit and take medications as usual too. she was encouraged to wear comfortable clothes and shoes to the study appointment.       Mirna Echavarria RN

## 2021-06-02 NOTE — PATIENT INSTRUCTIONS - HE
Nice to visit today.We are going to plan a calcium score and I will be in touch with results.My nurse is Mandie and her number is [phone].We are going to plan a abdominal ultrasound because of your family history of aorta aneurysm.

## 2021-06-03 VITALS
BODY MASS INDEX: 23.92 KG/M2 | RESPIRATION RATE: 20 BRPM | HEIGHT: 64 IN | SYSTOLIC BLOOD PRESSURE: 117 MMHG | DIASTOLIC BLOOD PRESSURE: 82 MMHG | TEMPERATURE: 97.3 F | HEART RATE: 61 BPM | WEIGHT: 140.1 LBS

## 2021-06-03 VITALS
SYSTOLIC BLOOD PRESSURE: 118 MMHG | WEIGHT: 139 LBS | HEART RATE: 60 BPM | BODY MASS INDEX: 23.73 KG/M2 | HEIGHT: 64 IN | RESPIRATION RATE: 16 BRPM | DIASTOLIC BLOOD PRESSURE: 72 MMHG

## 2021-06-04 VITALS
DIASTOLIC BLOOD PRESSURE: 66 MMHG | BODY MASS INDEX: 23.63 KG/M2 | HEART RATE: 84 BPM | SYSTOLIC BLOOD PRESSURE: 110 MMHG | WEIGHT: 142 LBS | OXYGEN SATURATION: 97 %

## 2021-06-05 VITALS
SYSTOLIC BLOOD PRESSURE: 126 MMHG | HEART RATE: 76 BPM | DIASTOLIC BLOOD PRESSURE: 80 MMHG | BODY MASS INDEX: 23.22 KG/M2 | WEIGHT: 136 LBS | RESPIRATION RATE: 16 BRPM | HEIGHT: 64 IN

## 2021-06-07 DIAGNOSIS — R25.9 ABNORMAL INVOLUNTARY MOVEMENT: ICD-10-CM

## 2021-06-07 RX ORDER — CLONAZEPAM 0.5 MG/1
TABLET ORAL
Qty: 270 TABLET | Refills: 0 | Status: SHIPPED | OUTPATIENT
Start: 2021-06-07

## 2021-06-07 NOTE — TELEPHONE ENCOUNTER
----- Message from Sonya Basurto sent at 4/10/2020 10:57 AM CDT -----    Caller: Patient    Primary cardiologist: Dr. Smyth    Detailed reason for call: Patient stated that she was wondering if she could continue strenuous activity and exercise during her AFIB or should she stop? Please advise    Best phone number: [phone]  Best time to contact: any  Ok to leave a detailed message? yes  Device? no

## 2021-06-07 NOTE — TELEPHONE ENCOUNTER
[place] is a patient of Dr. Lewis's whose last visit was 3- and has appointment scheduled with you 8-9-2021.  [place] is requesting refill of Clonazepam.  Last prescription was sent by Dr. Lewis 2-.  Will you authorize refill?  Medication T'd for review and signature  Carrie Montilla RN on 6/7/2021 at 1:59 PM

## 2021-06-07 NOTE — TELEPHONE ENCOUNTER
Pt called for a refill of Clonazepam. I suggested that she make a Aug appointment to see Dr. Lalo Main, and then we would send in a 90 day supply to [facility]. No need to call pt

## 2021-06-07 NOTE — TELEPHONE ENCOUNTER
----- Message from Yung Smyth MD sent at 5/5/2020 10:36 PM CDT -----  Labs reviewed, cholesterol numbers are not optimal but coronary calcium is zero she has in the past not wanted to be on statins, should be scheduled for follow up mdg

## 2021-06-09 NOTE — TELEPHONE ENCOUNTER
to call [phone], or [phone] to discuss.  -[facility]    ----- Message from Gypsy Puente sent at 7/16/2020 10:05 AM CDT -----  General phone call:  PATIENT STATES MOUSTAPHA ON ORDER FROM [facility]. SHE IS HOPING TO GET THIS BY Saturday, BUT IF SHE DOESN'T, IS IT OK IF SHE MISSES A COUPLE OF DAYS?    PLEASE CALL  Caller: PATIENT  Primary cardiologist: DR HOROWITZ  Detailed reason for call: SEE ABOVE  New or active symptoms? NO  Best phone number: [phone]  Best time to contact: SHE WILL BE OUT FOR A BIT, BUT OK TO LEAVE A VOICE MAIL  Ok to leave a detailed message? YES   Device? NO    Additional Info:

## 2021-06-10 NOTE — PATIENT INSTRUCTIONS - HE
It was nice to visit with you today.  We talked about some palpitations you had previously been experiencing and if this starts to become a more significant occurrence of please update me.  I think it would be helpful to have you keep a calendar of these events including the length of time and your symptoms.  We talked about your elevated cholesterol numbers but CT scan of your heart showing no evidence for premature hardened plaque.  My nurses name is Mandie and her number is [phone].

## 2021-06-10 NOTE — PROGRESS NOTES
Per D/C line Dr. Smyth - patient to have holter, f/u in Afib clinic in 7-10 days and f/u in 4-6 weeks with [facility].     Order placed.  notified to contact patient to sched.

## 2021-06-10 NOTE — TELEPHONE ENCOUNTER
Wellness Screening Tool  Symptom Screening:  Do you have one of the following NEW symptoms:    Fever (subjective or >100.0)?  No    A new cough?  No    Shortness of breath?  No     Chills? No     New loss of taste or smell? No     Generalized body aches? No     New persistent headache? No     New sore throat? No     Nausea, vomiting, or diarrhea?  No    Within the past 3 weeks, have you been exposed to someone with a known positive illness below:    COVID-19 (known or suspected)?  No    Chicken pox?  No    Mealses?  No    Pertussis?  No    Patient notified of visitor policy- They may have one person accompany them to their appointment, but they will need to wear a mask and will be screened upon arrival for symptoms: Yes  Pt informed to wear a mask: Yes  Pt notified if they develop any symptoms listed above, prior to their appointment, they are to call the clinic directly at [phone] for further instructions.  Yes  Patient's appointment status: Patient will be seen in clinic as scheduled on 7/29

## 2021-06-11 NOTE — PROGRESS NOTES
1950  [phone] (home)  There is no such number on file (mobile).    +++Important patient information for [facility]/Cath Lab staff : PT IS ON ELIQUIS AND NEW SOTALOL START 6/24/2017+++    [facility] EP Cath Lab Procedure Order   Cardioversion:    Cardioversion    PT IS ON ELIQUIS AND NO MISSED DOSES    Diagnosis:  AF  Anticipated Case Duration:  Standard  Scheduling Needs/Timeframe:  PT IS SET FOR MONDAY 6/26/2017 AT 12 00    Current Device: None None  Device Company/Device Rep Needed for Procedure: None    Anesthesia:  General-CV Only  Research Protocol:  No    [facility] EP Cath Lab Prep   Ordering Provider: Chriss Noel NP  Ordering Date: 6/19/2017  Orders Status: Intial order placed and Order set placed  EP NC Contact: Mariana Junior LPN    H&P:  Compled by CHRISS NOEL CNP on 6/19/2017  PCP: Carolann Okeefe MD, [phone]    Pre-op Labs: N/A for procedure    Medical Records Pertinent for Procedure:  N/A    Patient Education:    PT WILL BE DROPPED OFF AND PICKED UP AFTER PROCEDURE  PT INSTRUCTED TO HOLD ANY VITAMINS, MINERALS CALCIUM, IRON OR SUPPLEMENTS THE MORNING OF CV  PT IS INSTRUCTED TO STOP HER METOPROLOL ON SAT.  6/24/2017  PT INSTRUCTED TO START HER SOTALOL 120 MG 1 Q 12 HRS ON SAT. 6/24/2017  PT IS ON ELIQUIS AND NO MISSED DOSES,CLEARED WITH CHRISS NOEL CNP  PT HAS A FOLLOW WITH DR HOROWITZ 7/12 AND IS TO KEEP THAT APPT      Teach with Patient: Completed via phone on 6/20/2017    Risks Reviewed:     Cardioversion    >90% acute success rate, <10% failure to convert or   reverts shortly after cardioversion.    <1% embolic event of (CVA, pulmonary embolism, or   other site).    75% risk for superficial burn.  Risks associated with general anesthesia will be addressed by the Anesthesiology Department    Consent: Will be obtained in [facility] day of procedure    Pre-Procedure Instructions that were Reviewed with Patient:  NPO after midnight, Notified patient of time and date of procedure by CV , Transportation  arrangements needed s/p procedure, Post-procedure follow up process and Sedation plan/orders    Pre-Procedure Medication Instructions:  Instructions given to pt regarding anticoagulants: Eliquis- instructed to continue anticoagulation uninterrupted through their procedure  Instructions given to pt regarding antiarrhythmic medication: Sotalol; Pt instructed to continue medication prior to procedure  Instructions for medication, other than anticoagulants/antiarrhythmics listed above, given to pt: to take all morning medications with small sips of water, with the exception of OTC supplements and MVI    No Known Allergies    Current Outpatient Prescriptions:      APIXABAN (ELIQUIS ORAL), Take 5 mg by mouth 2 (two) times a day. , Disp: , Rfl:      cholecalciferol, vitamin D3, (VITAMIN D3) 2,000 unit cap, Take 2,000 Units by mouth daily as needed., Disp: , Rfl:      clonazePAM (KLONOPIN) 0.5 MG tablet, Take 0.5 mg by mouth 2 (two) times a day. 7:30 am + 2:30 pm, for tremor., Disp: , Rfl:      Lactobacillus rhamnosus GG (CULTURELLE) 10-15 Billion cell capsule, Take 1 capsule by mouth daily as needed., Disp: , Rfl:      magnesium glycinate 100 mg Tab, Take 300 mg by mouth at bedtime. Leg cramps, Disp: , Rfl:      melatonin-pyridoxine HCl, B6, 3-1 mg Tab, Take 0.5-1 tablets by mouth at bedtime as needed., Disp: , Rfl:      multivitamin therapeutic (THERAGRAN) tablet, Take 1 tablet by mouth daily as needed., Disp: , Rfl:      OMEGA-3/DHA/EPA/FISH OIL (FISH OIL-OMEGA-3 FATTY ACIDS) 300-1,000 mg capsule, Take 1 g by mouth daily as needed. , Disp: , Rfl:      sotalol (BETAPACE) 120 MG tablet, Take 1 tablet (120 mg total) by mouth 2 (two) times a day., Disp: 60 tablet, Rfl: 3     UNABLE TO FIND, Med Name: Curcumin, Disp: , Rfl:

## 2021-06-11 NOTE — ANESTHESIA PREPROCEDURE EVALUATION
Anesthesia Evaluation      Patient summary reviewed   No history of anesthetic complications     Airway   Mallampati: II  Neck ROM: full   Pulmonary - normal exam    breath sounds clear to auscultation  (-) shortness of breath, recent URI, sleep apnea                         Cardiovascular   Exercise tolerance: > or = 4 METS  (+) hypertension, dysrhythmias, , hypercholesterolemia,     (-) angina  ECG reviewed  Rhythm: irregular        Neuro/Psych    (+) neuromuscular disease,      Endo/Other    (-) no diabetes     GI/Hepatic/Renal       Other findings: Hx DVT PE      Dental    (+) caps and chipped                       Anesthesia Plan  Planned anesthetic: general mask    ASA 2   Induction: intravenous   Anesthetic plan and risks discussed with: patient    Post-op plan: routine recovery

## 2021-06-11 NOTE — ANESTHESIA POSTPROCEDURE EVALUATION
Patient: [place] N[place]  * No procedures listed *  Anesthesia type: general    Patient location: Telemetry/Step Down Unit  Last vitals:   Vitals:    06/26/17 1400   BP: 104/71   Pulse: (!) 104   Resp: 16   Temp:    SpO2:      Post vital signs: stable  Level of consciousness: awake and responds to simple questions  Post-anesthesia pain: pain controlled  Post-anesthesia nausea and vomiting: no  Pulmonary: unassisted, return to baseline  Cardiovascular: stable and blood pressure at baseline  Hydration: adequate  Anesthetic events: no    QCDR Measures:  ASA# 11 - Ruthy-op Cardiac Arrest: ASA11B - Patient did NOT experience unanticipated cardiac arrest  ASA# 12 - Ruthy-op Mortality Rate: ASA12B - Patient did NOT die  ASA# 13 - PACU Re-Intubation Rate: ASA13B - Patient did NOT require a new airway mgmt  ASA# 10 - Composite Anes Safety: ASA10A - No serious adverse event  ASA# 38 - New Corneal Injury: ASA38A - No new exposure keratitis or corneal abrasion in PACU    Additional Notes:

## 2021-06-11 NOTE — ANESTHESIA CARE TRANSFER NOTE
Last vitals:   Vitals:    06/26/17 1204   Pulse: (!) 133   Resp: 24   Temp: 36.4  C (97.5  F)   SpO2: 100%     Patient's level of consciousness is drowsy  Spontaneous respirations: yes  Maintains airway independently: yes  Dentition unchanged: yes  Oropharynx: oropharynx clear of all foreign objects    QCDR Measures:  ASA# 20 - Surgical Safety Checklist: ASA20A - Safety Checks Done  PQRS# 430 - Adult PONV Prevention: 4558F - Pt received => 2 anti-emetic agents (different classes) preop & intraop  ASA# 8 - Peds PONV Prevention: NA - Not pediatric patient, not GA or 2 or more risk factors NOT present  PQRS# 424 - Ruthy-op Temp Management: NA - MAC anesthesia or case < 60 minutes  PQRS# 426 - PACU Transfer Protocol: - Transfer of care checklist used  ASA# 14 - Acute Post-op Pain: ASA14B - Patient did NOT experience pain >= 7 out of 10

## 2021-06-11 NOTE — PROGRESS NOTES
1950  [phone] (home)  [phone] (mobile)    +++Important patient information for [facility] [place] [facility]/Cath Lab staff : PT IS ON ELIQUIS, AMIODARONE+++    [facility] EP Cath Lab Procedure Order   Cardioversion:    Cardioversion     PT IS ON ELIQUIS AND NO MISSED DOSES    Diagnosis:  AF  Anticipated Case Duration:  Standard  Scheduling Needs/Timeframe:  PT IS SET FOR FRIDAY 7/31/2017 AT 11 30    Current Device: None None  Device Company/Device Rep Needed for Procedure: None    Anesthesia:  General-CV Only  Research Protocol:  No    [facility] EP Cath Lab Prep   Ordering Provider: Chriss Noel NP  Ordering Date: 7/11/2017  Orders Status: Intial order placed and Order set placed  EP NC Contact: Mariana Junior LPN    H&P:  Compled by CHRISS NOEL CNP on 7/10/2017  PCP: Carolann Okeefe MD, [phone]    Pre-op Labs: N/A for procedure    Medical Records Pertinent for Procedure:  N/A    Patient Education:    PT WILL BE DROPPED OFF AND  FOR PROCEDURE  PT IS ON ELIQUIS AND NO MISSED DOSES  PT IS ON AMIODARONE  PT INSTRUCTED TO HOLD ANY VITAMINS, MINERALS, CALCIUM, IRON OR SUPPLEMENTS THE MORNING OF CV  PT HAS BEEN INSTRUCTED TO DECREASE HER METOPROLOL TARTRATE 100 MG TO 1/2 TAB BID STARTING ON WED 7/19/2017  PT HAS BEEN INSTRUCTED TO DECREASE HER AMIODARONE 200 MG TO 1 QD STARTING ON 7/28/2017   PT HAS A FOLLOW UP APPT WITH DR HOROWITZ 8/18     Teach with Patient: Completed via phone on 7/12/2017    Risks Reviewed:     Cardioversion    >90% acute success rate, <10% failure to convert or   reverts shortly after cardioversion.    <1% embolic event of (CVA, pulmonary embolism, or   other site).    75% risk for superficial burn.  Risks associated with general anesthesia will be addressed by the Anesthesiology Department    Consent: Will be obtained in [facility] day of procedure    Pre-Procedure Instructions that were Reviewed with Patient:  NPO after midnight, Notified patient of time and date of procedure by CV , Transportation  arrangements needed s/p procedure, Post-procedure follow up process and Sedation plan/orders    Pre-Procedure Medication Instructions:  Instructions given to pt regarding anticoagulants: Eliquis- instructed to continue anticoagulation uninterrupted through their procedure  Instructions given to pt regarding antiarrhythmic medication: Amiodarone; Pt instructed to continue medication prior to procedure  Instructions for medication, other than anticoagulants/antiarrhythmics listed above, given to pt: to take all morning medications with small sips of water, with the exception of OTC supplements and MVI    No Known Allergies    Current Outpatient Prescriptions:      amiodarone (PACERONE) 200 MG tablet, Take 1 tablet (200 mg total) by mouth 2 (two) times a day. X 4 weeks, then decrease to 200 mg daily, Disp: 60 tablet, Rfl: 2     APIXABAN (ELIQUIS ORAL), Take 5 mg by mouth 2 (two) times a day. , Disp: , Rfl:      clonazePAM (KLONOPIN) 0.5 MG tablet, Take 0.5 mg by mouth 2 (two) times a day. 7:30 am + 2:30 pm, for tremor., Disp: , Rfl:      ferrous sulfate 325 (65 FE) MG tablet, Take 1 tablet by mouth daily with breakfast., Disp: , Rfl:      Lactobacillus rhamnosus GG (CULTURELLE) 10-15 Billion cell capsule, Take 1 capsule by mouth daily as needed., Disp: , Rfl:      magnesium glycinate 100 mg Tab, Take 300 mg by mouth at bedtime. Leg cramps, Disp: , Rfl:      melatonin-pyridoxine HCl, B6, 3-1 mg Tab, Take 0.5-1 tablets by mouth at bedtime as needed., Disp: , Rfl:      metoprolol tartrate (LOPRESSOR) 100 MG tablet, Take 1 tablet (100 mg total) by mouth 2 (two) times a day., Disp: 60 tablet, Rfl: 6     multivitamin therapeutic (THERAGRAN) tablet, Take 1 tablet by mouth daily as needed., Disp: , Rfl:      OMEGA-3/DHA/EPA/FISH OIL (FISH OIL-OMEGA-3 FATTY ACIDS) 300-1,000 mg capsule, Take 1 g by mouth daily as needed. , Disp: , Rfl:      UNABLE TO FIND, Med Name: Curcumin, Disp: , Rfl:

## 2021-06-11 NOTE — PROGRESS NOTES
[facility] HEART [facility]   Arrhythmia Clinic    Assessment/Plan:  Diagnoses and all orders for this visit:    Persistent atrial fibrillation and failed cardioversion on sotalol and briefly bradycardic.  Sotalol stopped and started on amiodarone 200 mg p.o. twice daily and to continue for 4 weeks prior to repeat cardioversion.  Restarted back on metoprolol tartrate 100 mg p.o. twice daily.  She is not on amiodarone quite 2 weeks yet and see no rate control with amiodarone yet.  To decrease metoprolol tartrate to 50 mg p.o. twice daily starting July 19.  July 28 she is eligible for cardioversion.  [id number] score of 3 and PE and and superficial DVT with diagnosis of A. fib.  She denies any missed doses of Eliquis since discharge from the hospital.  She denies any side effects on amiodarone.  She had a TSH in May so lab appointment was canceled today.  I discussed cardioversion and prep for this.  She had one several weeks ago so not a lot of questions today.  Orders placed today for cardioversion as well.  She tells me that Dr. Kohli from [place] [facility] told her that she should never have had cardioversion and didn't advise another cardioversion.    He alluded to PE risks associated with this procedure.  They are not in [facility] so ordered his consult which she signed for.  I will call her when I get those results back.  This of course was very disconcerting to her.  She thinks she is symptomatic with atrial fib and would like to get back in sinus rhythm if able.  She is complaining of fatigue and shortness of breath with activity.  I discussed long half-life of amiodarone and need to wait until July 28 when she has been on amiodarone a month before attempting cardioversion again.  She may need further decrease in metoprolol dose at time of cardioversion but will wait and see.    Will follow up with Dr. Smyth in 5-6 weeks.  This should be several weeks after cardioversion.  I discussed with [place] that if she fails  cardioversion with this load of amiodarone I recommend that she have amiodarone stopped and be switched to rate control.  Would put her back on metoprolol tartrate 100 mg p.o. twice daily and add diltiazem 180 mg p.o. daily and then do 24-hour Holter on a normal day for her.  Can see from previous Holter that metoprolol tartrate 100 mg twice daily is not enough for  rate control in A. fib for her.  Discussed atrial fib likely related to tricuspid regurg and needs to follow with Dr. Smyth long-term with valvular heart disease.    Essential hypertension with goal blood pressure less than 140/90 and well controlled.    Other orders  -     ferrous sulfate 325 (65 FE) MG tablet; Take 1 tablet by mouth daily with breakfast.    40 minutes were spent in face-to-face counseling regarding above diagnoses and options for treatment.  Multiple questions were addressed.      ______________________________________________________________________    Subjective:    I had the opportunity to see [place] Nava at the [facility]. [place] Nava is a 67 y.o. female and here for EP follow-up regarding persistent atrial fib which was diagnosed with hospitalization with bilateral pulmonary embolus, DVT that was nonobstructive in right leg and and persistent A. fib with RVR in May 26-29, 2017.  I started her on sotalol 120 mg p.o. every 12 hours 2 days prior to cardioversion.  She had brief conversion to sinus rhythm and was bradycardic and then reverted back into persistent atrial fib.  Sotalol was stopped and she was started on amiodarone.  [place] Nava has no previous cardiac history prior to hospitalization in May 2017.  She has no prior history of blood clots or  clotting disorders in family history.  She states she had no lab work with hematology consult.  To discuss hematology consult more with Dr. Okeefe on follow-up there.  She complains of fatigue and some shortness of breath with activity.  She is feeling  some better overall.  She tells me that she was very much concerned after hematology consult because of thing she heard there.  See above for more details.  Since I do not have a copy of the consult in [place] [facility] difficult to address all of her concerns.  Follow-up with Dr. Smyth which was in mid July has been canceled.  Will reschedule with him.  She has iron deficiency anemia and was told to stay on iron supplement.  This visit will serve as history and physical for cardioversion.  See problem list for more details.  Medical, past medical, surgical and social history reviewed.  Meds and allergies reviewed.       ______________________________________________________________________    Problem List:  Patient Active Problem List   Diagnosis     Varicose Veins     (Lower) Leg Localized Swelling Bilateral     Diverticulosis     Osteopenia     Asymptomatic Postmenopausal Status     Leukopenia     Hyperlipidemia     Familial (Benign Essential) Tremor     Essential hypertension     Bunion     Basal cell carcinoma of skin     Family history of colon cancer     Hallux rigidus     Hammer toe     Pulmonary embolism     Persistent atrial fibrillation     Microcytic anemia     Acute DVT of right tibial vein     Medical History:  Past Medical History:   Diagnosis Date     Atrial fibrillation      DVT (deep vein thrombosis) in pregnancy 05/2017    rt leg from atrial fib     Familial tremor      PE (pulmonary thromboembolism) 05/2017    dvt rt leg and PE rt lung     Skin cancer      Surgical History:  Past Surgical History:   Procedure Laterality Date     CARDIOVERSION  06/26/2017    sinus x 10 min, then back to afib     DILATION AND CURETTAGE OF UTERUS       FOOT SURGERY       MOHS SURGERY       Social History:  Social History   Substance Use Topics     Smoking status: Never Smoker     Smokeless tobacco: None     Alcohol use 0.6 oz/week     1 Glasses of wine per week      Comment: occasional        Review of Systems: Review of Systems:  "  General: WNL  Eyes: WNL  Ears/Nose/Throat: WNL  Lungs: WNL  Heart: Shortness of Breath with activity, Leg Swelling  Stomach: WNL  Bladder: WNL  Muscle/Joints: WNL  Skin: WNL  Nervous System: WNL  Mental Health: WNL     Blood: WNL      Family History:  Family History   Problem Relation Age of Onset     Hypertension Mother      Colon cancer Sister      No Medical Problems Father      Atrial fibrillation Brother      Pacemaker Brother      No Medical Problems Sister          Allergies:  No Known Allergies  Medications:  Current Outpatient Prescriptions   Medication Sig Dispense Refill     amiodarone (PACERONE) 200 MG tablet Take 1 tablet (200 mg total) by mouth 2 (two) times a day. X 4 weeks, then decrease to 200 mg daily 60 tablet 2     APIXABAN (ELIQUIS ORAL) Take 5 mg by mouth 2 (two) times a day.        clonazePAM (KLONOPIN) 0.5 MG tablet Take 0.5 mg by mouth 2 (two) times a day. 7:30 am + 2:30 pm, for tremor.       ferrous sulfate 325 (65 FE) MG tablet Take 1 tablet by mouth daily with breakfast.       Lactobacillus rhamnosus GG (CULTURELLE) 10-15 Billion cell capsule Take 1 capsule by mouth daily as needed.       magnesium glycinate 100 mg Tab Take 300 mg by mouth at bedtime. Leg cramps       metoprolol tartrate (LOPRESSOR) 100 MG tablet Take 1 tablet (100 mg total) by mouth 2 (two) times a day. 60 tablet 6     multivitamin therapeutic (THERAGRAN) tablet Take 1 tablet by mouth daily as needed.       OMEGA-3/DHA/EPA/FISH OIL (FISH OIL-OMEGA-3 FATTY ACIDS) 300-1,000 mg capsule Take 1 g by mouth daily as needed.        UNABLE TO FIND Med Name: Curcumin       melatonin-pyridoxine HCl, B6, 3-1 mg Tab Take 0.5-1 tablets by mouth at bedtime as needed.       No current facility-administered medications for this visit.        Objective:   Vital signs:  /68 (Patient Site: Left Arm, Patient Position: Sitting, Cuff Size: Adult Regular)  Pulse 84  Resp 16  Ht 5' 4\" (1.626 m)  Wt 133 lb (60.3 kg)  BMI 22.83 " kg/m2      Physical Exam:    GENERAL APPEARANCE: Alert, cooperative and in no acute distress.  HEENT: No scleral icterus. No Xanthelasma. Oral mucuos membranes pink and moist.  NECK: JVP Nl.   CHEST: clear to auscultation  CARDIOVASCULAR: S1, S2 without murmur ,clicks or rubs. Irregular, irregular and 90's.  Radial and posterior tibial pulses are intact and symetric.   EXTREMITIES: No cyanosis, clubbing or edema.    Results personally reviewed:  Results for orders placed during the hospital encounter of 05/26/17   Echo Complete [ECH10] 05/27/2017    Narrative   Atrial fibrillation with mild increased ventricular response    Left ventricle ejection fraction is mildly decreased. Left ventricular   ejection fraction estimated at 45-50%. Mild global hypokinesis suggested    Moderate biatrial enlargement    Elevated central venous pressure suggested with dilated IVC    Right ventricular systolic function appears mildly reduced    Moderate tricuspid insufficiency without Doppler evidence to suggest   significant pulmonary hypertension        June 2, 2017 24-hour Holter shows persistent A. fib throughout Holter and average heart rate 110 with heart rate range 59- 1 85.  No significant pauses or bradycardia.  Moderately frequent PVCs of total of 802 in 24 hour.  Note medications were not adjusted up today because she is loading on amiodarone.        Results for orders placed or performed during the hospital encounter of 05/26/17   ECG 12 lead MUSE   Result Value Ref Range    SYSTOLIC BLOOD PRESSURE  mmHg    DIASTOLIC BLOOD PRESSURE  mmHg    VENTRICULAR RATE 110 BPM    ATRIAL RATE 115 BPM    P-R INTERVAL  ms    QRS DURATION 70 ms    Q-T INTERVAL 364 ms    QTC CALCULATION (BEZET) 492 ms    P Axis  degrees    R AXIS 84 degrees    T AXIS 19 degrees    MUSE DIAGNOSIS       Atrial fibrillation with rapid ventricular response  Low voltage QRS  Abnormal ECG  No previous ECGs available  Confirmed by ARIELLE HOROWITZ MD LOC: ([id number])  on 5/28/2017 3:40:39 PM         TSH:   Lab Results   Component Value Date    TSH 0.86 05/26/2017     BNP:   Lab Results   Component Value Date     (H) 05/26/2017     BMP:  Lab Results   Component Value Date    CREATININE 0.82 05/29/2017    BUN 17 05/29/2017     05/29/2017    K 4.0 05/29/2017     (H) 05/29/2017    CO2 24 05/29/2017       This note has been dictated using voice recognition software. Any grammatical or context distortions are unintentional and inherent to the software.    MELANIA VICK RN, [facility]  [phone]

## 2021-06-11 NOTE — PROGRESS NOTES
Pt's CV failed 6/26/2017 sotalol discontinued and pt to start amiodarone 6/29/2017  Pt called to review amio protocol  Baseline labs done, will need TSH  Per Jan no PFT'S or Chest may be short term  Reviewed sunscreen usage  Upon calling pt she  mentioned she was seen by a hematologist yesterday and was told her liver test in 5/2017 were elevated  Pt concerned and is questioning if she should start the amiodarone.  reviewed with Jan and pt is to start amio and follow up as planned with Chriss 7/10 and liver test ALT is ok   Pt understood and felt better about labs  Pt has my direct number for contact, and agrees to plan

## 2021-06-12 NOTE — PROGRESS NOTES
Patient admitted this morning for elective cardioversion  Rhythm check reveals sinus bradycardia  Obtained order for ECG; sinus bradycardia confirmed  Vital signs are stable  Patient is resting comfortably and waiting further instructions

## 2021-06-12 NOTE — PROCEDURES
Procedures    Cardioversion, canceled.  Patient arrived in sinus rhythm.    [place] Nava is a very pleasant 67-year-old woman who was newly diagnosed with persistent atrial fibrillation with rapid ventricular response at the time of hospitalization for pulmonary emboli and nonobstructive DVT in the right tibial vein.  She was started on Eliquis 5 mg twice daily and reports no missed doses or bleeding issues.  It appears that progressive symptoms of fatigue and dyspnea on exertion are most likely due to arrhythmia.  She was started on low-dose sotalol and underwent ultimately unsuccessful cardioversion on 6/26/2017 due to early recurrence after approximately 10 minutes.  Of note, she initially converted to sinus bradycardia with rates in the 40s-50s before returning back to atrial fibrillation.  She is now been fully loaded on amiodarone with a decrease in metoprolol tartrate 25 mg twice daily.  She reports feeling significantly better since the decrease in metoprolol.  She arrives today in sinus rhythm the upper 40s and lower 50s.  ECG was personally reviewed, shows sinus bradycardia 51 bpm with PACs, QT/QTc interval measures 510/470 ms. discontinue metoprolol, continue amiodarone 200 mg daily.  Continue Eliquis 5 mg twice daily for stroke prophylaxis.  Medications were reviewed and discussed with [place].  Labs for amiodarone monitoring (ALT and TSH) were drawn, results pending.    Follow-up with Dr. Smyth on 8/18/2017 as previously scheduled.

## 2021-06-12 NOTE — ANESTHESIA PREPROCEDURE EVALUATION
Anesthesia Evaluation      Patient summary reviewed     Airway    Pulmonary                           Cardiovascular   (+) hypertension, dysrhythmias, , hypercholesterolemia,      Neuro/Psych    (+) neuromuscular disease,      Endo/Other       GI/Hepatic/Renal    (+)   chronic renal disease CRI,      Other findings: Had cardioversion 6/26/17, record reviewed. H/o a-fib with RVR.  H/o DVT and PE. Hg 12.4, K 4.4, Cr 1.28, GFR 41, elevated LFTs and low proteins.  Echo 5/27/17:  Atrial fibrillation with mild increased ventricular response  Left ventricle ejection fraction is mildly decreased. Left ventricular ejection fraction estimated at 45-50%. Mild global hypokinesis suggested  Moderate biatrial enlargement  Elevated central venous pressure suggested with dilated IVC  Right ventricular systolic function appears mildly reduced  Moderate tricuspid insufficiency without Doppler evidence to suggest significant pulmonary hypertension      Dental                         Anesthesia Plan

## 2021-06-12 NOTE — PROGRESS NOTES
[facility] Progress Note    Assessment:  1.  Atrial fibrillation with fast ventricular response.  I met her in consultation May 2017 when she was admitted with deep venous thrombosis and pulmonary embolism and found to be in atrial fibrillation.  Based on symptoms it sounds as if she may have been in atrial fibrillation for at least a month prior to the evaluation in hospital.  She has been started on amiodarone and is back in sinus rhythm.  She is feeling very well.  We discussed a follow-up echocardiogram and Holter monitor to determine heart rate in sinus rhythm.  We discussed the potential downsides of amiodarone.  I would like her to visit with 1 of my EP colleagues to discuss options and alternatives to amiodarone in the mid term.  She had elevated liver function tests July 14, 2017 but subsequent liver function tests have been normal on July 20 1 July 31st.. We talked about anticoagulation given her chads vas score of 3.  She does have moderate dilatation of the atrium.  We might consider the possibility of stress testing pending the results of the above tests.    2.  DVT, pulmonary embolism.  Will look forward to Dr. Okeefe and Dr. Solano input in this regard.  Currently she would require Eliquis for atrial fibrillation.  In addition she was noted to be iron deficient and subsequent iron levels are elevated and she had questions in this regard which I asked her to review further with her primary care physician.        Plan:  Echoardiogram            Holter monitor            Consultation with Dr. Gonzalez or Dr. Alis Wells            Consider stress test pending the results of the above tests             Defer to primary care clinic regarding further workup or evaluation of DVT/pulmonary embolism and iron deficiency.             Transient elevation of liver function tests of uncertain etiology no liver function tests have normalized will need close monitoring of liver function tests.    1.  Atrial fibrillation  ECG 12 lead    Holter Monitor    Echo Complete   2. Other acute pulmonary embolism without acute cor pulmonale           An After Visit Summary was printed and given to the patient.    Subjective:    [place] Nava is a 67 y.o. female who returned for a planned  follow up visit.  She reports to me today that she is feeling very well.  She denies chest pain, dizziness, lightheadedness, syncope or near syncope.  She states she is back to walking and riding her bicycle and walking upwards of 4 miles regularly.  We met in hospital a few months ago when she was admitted for a pulmonary embolism and deep venous thrombosis.  She had a recent car ride but not certain that this would be the cause of the DVT and will defer to her primary care physician.  I also understand that she saw Dr. Seun dyson from hematology.  She had been describing diminished exercise tolerance over approximately 1 month period of time prior to admission and I suspect that she may have been in atrial fibrillation prior to admission to hospital although the duration was not certain.  As outlined in the chart she was placed on sotalol but had reoccurrence of atrial fibrillation.  She was then started on amiodarone in combination with metoprolol but felt really fatigued until the metoprolol was decreased.  She was evaluated July 31 for possible cardioversion but was in sinus rhythm to sinus bradycardia and amiodarone was continued and metoprolol was discontinued.  In the interim she states that she has been feeling very well.    Review of Systems:   General: (P) WNL  Eyes: (P) WNL  Ears/Nose/Throat: (P) WNL  Lungs: (P) WNL  Heart: (P) WNL  Stomach: (P) WNL  Bladder: (P) WNL  Muscle/Joints: (P) WNL  Skin: (P) WNL  Nervous System: (P) WNL  Mental Health: (P) WNL     Blood: (P) WNL      Problem List:    Patient Active Problem List   Diagnosis     Varicose Veins     (Lower) Leg Localized Swelling Bilateral     Diverticulosis      Osteopenia     Asymptomatic Postmenopausal Status     Leukopenia     Hyperlipidemia     Familial (Benign Essential) Tremor     Essential hypertension     Bunion     Basal cell carcinoma of skin     Family history of colon cancer     Hallux rigidus     Hammer toe     Pulmonary embolism     Persistent atrial fibrillation     Microcytic anemia     Acute DVT of right tibial vein       Social History     Social History     Marital status: Single     Spouse name: N/A     Number of children: N/A     Years of education: N/A     Occupational History     retired           Social History Main Topics     Smoking status: Never Smoker     Smokeless tobacco: Not on file     Alcohol use 0.6 oz/week     1 Glasses of wine per week      Comment: occasional     Drug use: No     Sexual activity: Not on file      Comment: single      Other Topics Concern     Not on file     Social History Narrative       Family History   Problem Relation Age of Onset     Hypertension Mother      Colon cancer Sister      No Medical Problems Father      Atrial fibrillation Brother      Pacemaker Brother      No Medical Problems Sister        Current Outpatient Prescriptions   Medication Sig Dispense Refill     amiodarone (PACERONE) 200 MG tablet Take 1 tablet (200 mg total) by mouth daily. 90 tablet 3     amiodarone (PACERONE) 200 MG tablet Take 1 tablet (200 mg total) by mouth daily. 30 tablet 0     APIXABAN (ELIQUIS ORAL) Take 5 mg by mouth 2 (two) times a day.        clonazePAM (KLONOPIN) 0.5 MG tablet Take 0.5 mg by mouth 2 (two) times a day. 7:30 am + 2:30 pm, for tremor.       ferrous sulfate 325 (65 FE) MG tablet Take 1 tablet by mouth daily with breakfast.       Lactobacillus rhamnosus GG (CULTURELLE) 10-15 Billion cell capsule Take 1 capsule by mouth daily as needed.       magnesium glycinate 100 mg Tab Take 300 mg by mouth at bedtime. Leg cramps       melatonin-pyridoxine HCl, B6, 3-1 mg Tab Take 0.5-1 tablets by mouth at bedtime  "as needed.       multivitamin therapeutic (THERAGRAN) tablet Take 1 tablet by mouth daily as needed.       OMEGA-3/DHA/EPA/FISH OIL (FISH OIL-OMEGA-3 FATTY ACIDS) 300-1,000 mg capsule Take 1 g by mouth daily as needed.        UNABLE TO FIND Med Name: Curcumin       No current facility-administered medications for this visit.      Facility-Administered Medications Ordered in Other Visits   Medication Dose Route Frequency Provider Last Rate Last Dose     sodium chloride 0.9%    Continuous PRN Kimberley Alcaraz CRNA           Objective:     /78 (Patient Site: Left Arm, Patient Position: Sitting, Cuff Size: Adult Large)  Pulse 64  Resp 18  Ht 5' 4\" (1.626 m)  Wt 133 lb (60.3 kg)  BMI 22.83 kg/m2  133 lb (60.3 kg)       Physical Exam:    GENERAL APPEARANCE: alert, no apparent distress  HEENT: no scleral icterus or xanthelasma  NECK: jugular venous pressure within normal limits  CHEST: symmetric, the lungs are clear to auscultation  CARDIOVASCULAR: regular rhythm without murmur, S4; no carotid bruits  Abdomen: No Organomegaly, masses, bruits, or tenderness. Bowels sounds are present      EXTREMITIES: no cyanosis, clubbing or edema    Cardiac Testing:  Indications      Pulmonary embolus       Summary        Atrial fibrillation with mild increased ventricular response    Left ventricle ejection fraction is mildly decreased. Left ventricular ejection fraction estimated at 45-50%. Mild global hypokinesis suggested    Moderate biatrial enlargement    Elevated central venous pressure suggested with dilated IVC    Right ventricular systolic function appears mildly reduced    Moderate tricuspid insufficiency without Doppler evidence to suggest significant pulmonary hypertension       CONCLUSION:  Abnormal Holter monitor with evidence of persistent atrial fibrillation  and significantly elevated daytime heart rates.        MD helen CHILEL 06/02/2017 16:58:46     HE MUSE      Value: Sinus " bradycardia with Premature atrial complexes   Cannot rule out Anterior infarct , age undetermined   Abnormal ECG   When compared with ECG of 27-MAY-2017 07:55,   Sinus rhythm has replaced Atrial fibrillation   Vent. rate has decreased BY  59 BPM   Confirmed by MANISH DAILEY MD LOC:[facility] ([id number]) on 7/31/2017 2:28:54 PM      ECG today demonstrates sinus bradycardia otherwise normal-appearing EKG  Lab Results:    Lab Results   Component Value Date     07/14/2017    K 4.4 07/14/2017     07/14/2017    CO2 21 (L) 07/14/2017    BUN 30 (H) 07/14/2017    CREATININE 1.28 (H) 07/14/2017    CALCIUM 9.2 07/14/2017     Lab Results   Component Value Date    CHOL 205 (H) 06/16/2017    TRIG 86 06/16/2017    HDL 54 06/16/2017     BNP (pg/mL)   Date Value   05/26/2017 662 (H)     Creatinine (mg/dL)   Date Value   07/14/2017 1.28 (H)   05/29/2017 0.82   05/27/2017 0.88   05/22/2012 0.8     LDL Calculated (mg/dL)   Date Value   06/16/2017 134 (H)   03/17/2014 174 (H)   05/22/2012 137.8 (H)     Lab Results   Component Value Date    WBC 5.9 06/16/2017    HGB 12.4 07/21/2017    HCT 37.9 06/16/2017    MCV 76 (L) 06/16/2017     06/16/2017               This note has been dictated using voice recognition software. Any grammatical or context distortions are unintentional and inherent to the software.      Yung Smyth M.D., F.A.C.C.  [facility]  [phone]

## 2021-06-13 NOTE — PROGRESS NOTES
1950  [phone] (home)  [phone] (mobile)    +++Important patient information for CSC/Cath Lab staff : None+++    [facility] EP Cath Lab Procedure Order   Ablation Type:  PVI- Atrial Fibrillation  Specific location of pathway: Left    Diagnosis:  AF  Anticipated Case Duration:  4  Scheduling Needs/Timeframe:  Next Available    MD Preference: Dr Angela NUNEZ Assist:  No Specific MD:  N/A    Current Device: None None  Device Company/Device Rep Needed for Procedure: None    Pre-Procedural Testing needed: None  Mapping System Required:  COLBY  ICE Needed:  Yes  Anesthesia:  General-Whole Case  Research Protocol:  No    [facility] EP Cath Lab Prep   Ordering Provider: Dr Miller  Ordering Date: 10/2/2017  Orders Status: Intial order placed and Order set placed  EP NC Contact: Baylee Ozuna RN    H&P:  EP NP to complete within 1 wk prior to scheduled PVI  PCP: Carolann Okeefe MD, [phone]    Pre-op Labs: Done within 7 days    Medical Records Pertinent for Procedure:  6-26-17 Cardioversion, Holter 8-30-17 Afib, Echo 8-30-17 EF 55-60% and EKG 8-18-17 SB @57,  7-31-17 SB @51,  5-27-17 Afib     Patient Education:    Teach with Patient: Completed via phone prior to procedure    Risks Reviewed:     Pulmonary Vein/AF/Radiofrequency Ablation  In addition to standard risks for Radiofrequency Ablation, there is:    <2% for significant pulmonary vein stenosis    <2% risk for embolic events    <1% risk for esophageal fistula    <1% risk death    Pulmonary Vein Isolation / Cryoablation Risks:    1-2% risk for phrenic nerve paralysis    <1% risk for pulmonary vein stenosis    Risk of esophageal irritation with no incidence of atrial-esophageal fistula    Rare cryoballoon rupture  <1% risk death       Cardiac Catheter Ablation    <1% risk for the following: hypotension, hemorrhage, vascular injury including perforation of vein, artery or heart, thrombophlebitis, systemic or pulmonic emboli; cardiac perforation, (tamponade),  infection, pneumothorax, arrhythmias, proarrhythmic effects of drugs, radiation exposure.    1-2% complete heart block (for AVNRT or septol accessory pathway).    <0.5% CVA or MI    <0.1% death    If external defibrillation is needed, 75% risk for superficial burn.    1-2% tamponade and aortic puncture with left sided transeptal approach for left side COLBY - increase risk of CVA to <2%.    Late arrhythmia recurrences depends upon the primary rhythm disturbance.          Consent: Will be obtained in [facility] day of procedure    Pre-Procedure Instructions that were Reviewed with Patient:  NPO after midnight, Notified patient of time and date of procedure by CV , Transportation arrangements needed s/p procedure, Post-procedure follow up process, Sedation plan/orders and Pre-procedure letter was sent to pt by CV     Pre-Procedure Medication Instructions:  Instructions given to pt regarding anticoagulants: Eliquis- instructed to continue anticoagulation uninterrupted through their procedure  Instructions given to pt regarding antiarrhythmic medication: Amiodarone; Pt instructed to continue medication prior to procedure  Instructions for medication, other than anticoagulants/antiarrhythmics listed above, given to pt: to hold all morning medications    No Known Allergies    Current Outpatient Prescriptions:      amiodarone (PACERONE) 200 MG tablet, TAKE 1 TABLET BY MOUTH EVERY DAY, Disp: 90 tablet, Rfl: 2     amiodarone (PACERONE) 200 MG tablet, Take 1 tablet (200 mg total) by mouth daily., Disp: 90 tablet, Rfl: 3     APIXABAN (ELIQUIS ORAL), Take 5 mg by mouth 2 (two) times a day. , Disp: , Rfl:      clonazePAM (KLONOPIN) 0.5 MG tablet, Take 0.5 mg by mouth 2 (two) times a day. 7:30 am + 2:30 pm, for tremor., Disp: , Rfl:      ferrous sulfate 325 (65 FE) MG tablet, Take 1 tablet by mouth daily with breakfast., Disp: , Rfl:      Lactobacillus rhamnosus GG (CULTURELLE) 10-15 Billion cell capsule, Take 1 capsule  by mouth daily as needed., Disp: , Rfl:      magnesium glycinate 100 mg Tab, Take 300 mg by mouth at bedtime. Leg cramps, Disp: , Rfl:      melatonin-pyridoxine HCl, B6, 3-1 mg Tab, Take 0.5-1 tablets by mouth at bedtime as needed., Disp: , Rfl:      metoprolol tartrate (LOPRESSOR) 50 MG tablet, , Disp: , Rfl:      multivitamin therapeutic (THERAGRAN) tablet, Take 1 tablet by mouth daily as needed., Disp: , Rfl:      OMEGA-3/DHA/EPA/FISH OIL (FISH OIL-OMEGA-3 FATTY ACIDS) 300-1,000 mg capsule, Take 1 g by mouth daily as needed. , Disp: , Rfl:      UNABLE TO FIND, Med Name: Curcumin, Disp: , Rfl:   No current facility-administered medications for this visit.     Facility-Administered Medications Ordered in Other Visits:      sodium chloride 0.9%, , , Continuous PRN, Kimberley Alcaraz, CRNA

## 2021-06-14 ENCOUNTER — AMBULATORY - HEALTHEAST (OUTPATIENT)
Dept: CARDIOLOGY | Facility: CLINIC | Age: 71
End: 2021-06-14

## 2021-06-14 DIAGNOSIS — Z00.6 EXAMINATION OF PARTICIPANT OR CONTROL IN CLINICAL RESEARCH: ICD-10-CM

## 2021-06-15 NOTE — ANESTHESIA CARE TRANSFER NOTE
Last vitals:   Vitals:    01/09/18 1140   BP: 103/58   Pulse: 65   Resp: 18   Temp: 36.6  C (97.8  F)   SpO2: 99%     Patient's level of consciousness is awake  Spontaneous respirations: yes  Maintains airway independently: yes  Dentition unchanged: yes  Oropharynx: oropharynx clear of all foreign objects    QCDR Measures:  ASA# 20 - Surgical Safety Checklist: WHO surgical safety checklist completed prior to induction  PQRS# 430 - Adult PONV Prevention: 4558F - Pt received => 2 anti-emetic agents (different classes) preop & intraop  ASA# 8 - Peds PONV Prevention: NA - Not pediatric patient, not GA or 2 or more risk factors NOT present  PQRS# 424 - Ruthy-op Temp Management: 4559F - At least one body temp DOCUMENTED => 35.5C or 95.9F within required timeframe  PQRS# 426 - PACU Transfer Protocol: - Transfer of care checklist used  ASA# 14 - Acute Post-op Pain: ASA14B - Patient did NOT experience pain >= 7 out of 10

## 2021-06-15 NOTE — PROGRESS NOTES
[facility]   Arrhythmia Clinic    Assessment/Plan:  Diagnoses and all orders for this visit:    Persistent atrial fibrillation and maintaining sinus rhythm on amiodarone 200 mg p.o. daily post pulmonary vein isolation ablation.  Had neurology evaluation and generalized tremor secondary to amiodarone and on medication for suppression of tremors but not very effective.  [place] cannot wait to be off of amiodarone which anticipate will happen 3 months post ablation.  Dr. Miller specified 2 weeks symptom monitor prior to follow-up with him and to be done on amiodarone.  [place] at least 3 weeks.  To do 2 weeks symptom monitor as soon as she returns.  Has follow-up with Dr. Miller on March 27 and to keep that appointment.  With persistent atrial fib and now PVI discussed that sleep study is mandatory and will affect success of PVI if SHANAE present and untreated.  She denies any snoring and normal weight so recommended that she see sleep physician as would recommend in-home study but I cannot order that.  To schedule this when she returns from [place] but not important that study be done prior to follow-up with Dr. Miller as want to keep appointment scheduled with him as planned.  I discussed details of the 2 week 1 patch symptom monitor.  Questions answered.  No complications post ablation.  -     One-Lead Patch Monitor; Future; Expected date: 2/7/18  -     Ambulatory referral to Sleep Medicine    Essential hypertension and well-controlled.    ______________________________________________________________________    Subjective:    I had the opportunity to see [place] N[place] at the [facility]. [place] Nava is a 67 y.o. female and here for follow-up after pulmonary vein isolation ablation on 1/9/2018 with Dr. Miller.  [place] Nava has a known history of persistent atrial fib which was diagnosed in May 2017 with hospitalization with bilateral pulmonary  embolus, DVT that was nonobstructive in right leg and persistent atrial fib with RVR.  She also had some bilateral lower leg extremity edema. She failed cardioversion on sotalol and noted to be briefly bradycardic in June 2017.  New persistent A. fib so she so was started on amiodarone  and converted to sinus rhythm in July with loading of amiodarone.  [place] has been maintaining sinus rhythm on amiodarone 200 mg p.o. daily before and after ablation.  With PVI on 1/9/2018 [place] had RF to 4 pulmonary veins plus posterior wall plus septal ablation.  She denies any symptoms of reoccurrence of atrial fib.  [place] is back to all of her normal activities and denies any complications post ablation on questioning.  She is leaving on Monday, February 12 for [place] for approximately 3 weeks but currently the ticket is open for return.  See above for details.  ______________________________________________________________________    Problem List:  Patient Active Problem List   Diagnosis     Varicose Veins     (Lower) Leg Localized Swelling Bilateral     Diverticulosis     Osteopenia     Hyperlipidemia     Familial (Benign Essential) Tremor     Essential hypertension     Bunion     Basal cell carcinoma of skin     Family history of colon cancer     Hallux rigidus     Hammer toe     Pulmonary embolism     Persistent atrial fibrillation     Medical History:  Past Medical History:   Diagnosis Date     Atrial fibrillation      DVT (deep vein thrombosis) in pregnancy 05/2017    rt leg from atrial fib     Familial tremor      PE (pulmonary thromboembolism) 05/2017    dvt rt leg and PE rt lung     Skin cancer      Surgical History:  Past Surgical History:   Procedure Laterality Date     CARDIOVERSION  06/26/2017    sinus x 10 min, then back to afib     DILATION AND CURETTAGE OF UTERUS       EP ABLATION PVI Left 1/9/2018    Procedure: EP Ablation PVI;  Surgeon: Monica Miller MD;  Location: [facility];  Service:       FOOT SURGERY       MOHS SURGERY       Social History:  Social History   Substance Use Topics     Smoking status: Never Smoker     Smokeless tobacco: Never Used     Alcohol use 0.6 oz/week     1 Glasses of wine per week      Comment: occasional        Review of Systems: Review of Systems:   General: WNL  Eyes: WNL  Ears/Nose/Throat: WNL  Lungs: WNL  Heart: WNL  Stomach: WNL  Bladder: WNL  Muscle/Joints: WNL  Skin: WNL  Nervous System: WNL  Mental Health: WNL     Blood: WNL      Family History:  Family History   Problem Relation Age of Onset     Hypertension Mother      Colon cancer Sister      No Medical Problems Father      Atrial fibrillation Brother      Pacemaker Brother      No Medical Problems Sister      Prostate cancer Brother          Allergies:  No Known Allergies  Medications:  Current Outpatient Prescriptions   Medication Sig Dispense Refill     amiodarone (PACERONE) 200 MG tablet TAKE 1 TABLET BY MOUTH EVERY DAY 90 tablet 2     APIXABAN (ELIQUIS ORAL) Take 5 mg by mouth 2 (two) times a day.        clonazePAM (KLONOPIN) 0.5 MG tablet Take 0.5 mg by mouth every morning.        clonazePAM (KLONOPIN) 1 MG tablet Take 1 mg by mouth every evening.       diphenhydrAMINE (BENADRYL) 25 mg tablet Take 12.5 mg by mouth at bedtime as needed for sleep.       ergocalciferol (VITAMIN D2) 50,000 unit capsule Take 50,000 Units by mouth once a week.       Lactobacillus rhamnosus GG (CULTURELLE) 10-15 Billion cell capsule Take 1 capsule by mouth 2 (two) times a day with meals.        magnesium glycinate 100 mg Tab Take 200 mg by mouth at bedtime. Leg cramps        melatonin 5 mg Tab tablet Take 5 mg by mouth at bedtime as needed (sleep).       multivitamin therapeutic (THERAGRAN) tablet Take 1 tablet by mouth daily.        OMEGA-3/DHA/EPA/FISH OIL (FISH OIL-OMEGA-3 FATTY ACIDS) 300-1,000 mg capsule Take 1 g by mouth daily.        TURMERIC ROOT EXTRACT ORAL Take 1 capsule by mouth 2 (two) times a week.       No current  "facility-administered medications for this visit.      Facility-Administered Medications Ordered in Other Visits   Medication Dose Route Frequency Provider Last Rate Last Dose     sodium chloride 0.9%    Continuous PRN Kimberley Alcaraz CRNA           Objective:   Vital signs:  /80 (Patient Site: Left Arm, Patient Position: Sitting, Cuff Size: Adult Regular)  Pulse 60  Resp 16  Ht 5' 4\" (1.626 m)  Wt 146 lb (66.2 kg)  BMI 25.06 kg/m2      Physical Exam:    GENERAL APPEARANCE: Alert, cooperative and in no acute distress.  HEENT: No scleral icterus. No Xanthelasma. Oral mucuos membranes pink and moist.  NECK: JVP Nl.   CHEST: clear to auscultation  CARDIOVASCULAR: S1, S2 without murmur ,clicks or rubs. Regular, regular.  Radial and posterior tibial pulses are intact and symetric.  EXTREMITIES: No cyanosis, clubbing or edema.    Results personally reviewed:  Results for orders placed during the hospital encounter of 08/30/17   Echo Complete [ECH10] 08/30/2017    Narrative 1. Normal left ventricular size and systolic performance with a visually   estimated ejection fraction of 55-60%.   2. No significant valvular heart disease is identified on this study.   3. Normal right ventricular size and systolic performance.   4. There is moderate left atrial enlargement. There is mild to moderate   right atrial enlargement.     When compared to the prior real-time echocardiogram dated 27 May 2017,   there has been an improvement in left ventricular systolic performance in   the degree of tricuspid insufficiency appears less on the current study.     May 2017 echo shows EF 45-50% and mild global hypokinesis.  Moderate biatrial enlargement.  Elevated central venous pressures.  RV systolic function appears mildly reduced.  Moderate tricuspid insufficiency.  June 2017 24-hour Holter shows persistent A. fib throughout with average ventricular response of 110.  No significant pauses or bradycardia.  Moderately " frequent PVCs with a total of 802 in 24 hours.  This was done on metoprolol tartrate 100 mg p.o. twice daily.         Results for orders placed or performed in visit on 01/03/18   ECG 12 lead with MUSE   Result Value Ref Range    SYSTOLIC BLOOD PRESSURE  mmHg    DIASTOLIC BLOOD PRESSURE  mmHg    VENTRICULAR RATE 59 BPM    ATRIAL RATE 59 BPM    P-R INTERVAL 124 ms    QRS DURATION 78 ms    Q-T INTERVAL 464 ms    QTC CALCULATION (BEZET) 459 ms    P Axis 72 degrees    R AXIS 66 degrees    T AXIS 46 degrees    MUSE DIAGNOSIS       Sinus bradycardia  Otherwise normal ECG  When compared with ECG of 18-AUG-2017 11:07,  No significant change was found  Confirmed by JORDI CAT MD LOC: ([id number]) on 1/4/2018 3:08:52 PM         TSH:   Lab Results   Component Value Date    TSH 2.76 01/03/2018     BNP:   Lab Results   Component Value Date     (H) 05/26/2017     BMP:  Lab Results   Component Value Date    CREATININE 0.95 01/03/2018    BUN 23 (H) 01/03/2018     01/03/2018    K 4.5 01/03/2018     01/03/2018    CO2 29 01/03/2018       This note has been dictated using voice recognition software. Any grammatical or context distortions are unintentional and inherent to the software.    MELANIA VICK, RN, [facility]  [phone]

## 2021-06-15 NOTE — PATIENT INSTRUCTIONS - HE
Please call my nurse Mandie with any heart related questions or write to me on my chart.Her number is [phone] I renewed Eliquis and the metoprolol

## 2021-06-15 NOTE — PROGRESS NOTES
[facility] HEART [facility]   Arrhythmia Clinic    Assessment/Plan:  Diagnoses and all orders for this visit:    Persistent atrial fibrillation and newly diagnosed this summer.  Indirectly symptomatic in that fatigued and short of breath in A. fib.  She had likely 3-6 months of A. fib prior to diagnosis and had mild heart failure at that time.  She failed sotalol but amiodarone has been very successful in holding her in rhythm.  We discussed potential triggers for atrial fib and to minimize caffeine and alcohol use while trying to maintain rhythm.  I discussed pros and cons of ablation.  She is a little young to stay on amiodarone long-term.  She has not had PFTs as originally started this as a trial of sinus.  Amiodarone labs done today as due.  She had brief elevation in liver functions and discuss this is why her primary recommended that she adhere to no alcohol.  Limited alcohol okay from my perspective as liver functions normal now.  Discussed that elevated LFT not likely related to amiodarone.  Quoted 65% success rate with ablation as doing ablation on amiodarone and moderate biatrial enlargement.  Plan would be to keep her on amiodarone until 3 months post ablation and then discontinue amiodarone use and see if she has reoccurrence of atrial fib.  Discussed plan again today.  Multiple questions answered.  At the end of the visit even though she had been wavering, she tells me she wants to proceed with ablation.  To hold Eliquis and all medications the morning of ablation.  Discussed what to expect in the postop course and postop restrictions.  See after visit summary for more details as also discussed what to watch for and call us with post ablation.  [id number] score of 5 with PE diagnosed at time of A. fib diagnosis.  On Eliquis and consistent with medication.  She has a ticket to fly to [place] on February 12.  To make appointment to see me prior to this flight.  I think it is okay because at that point she  will be almost a month post ablation and Dr. Miller is planning on keeping her on amiodarone post ablation for several months.    -     ECG 12 lead with MUSE  -     HM2(CBC w/o Differential)  -     Basic metabolic panel    Essential hypertension and well-controlled.    Long term use of drug  -     Thyroid Stimulating Hormone (TSH)  -     ALT  -     Cancel: ALT    40 minutes were spent in face-to-face counseling regarding above diagnoses and options for treatment.  ______________________________________________________________________    Subjective:    I had the opportunity to see [place] Nava at the [facility]. [place] Nava is a 67 y.o. female and here for H&P prior to PVI .  [place] Nava has a known history of persistent atrial fib which was diagnosed in May 2017 with hospitalization with bilateral pulmonary embolus, DVT that was nonobstructive in right leg and persistent atrial fib with RVR.  She also had some bilateral lower leg extremity edema.  [place] tells me that she had noticed with her first bike ride in May that she was more short of breath and did not tolerate hills as well.  She had been to [place] in February and tells me that they went and to the [place] for 4 days in a row and did lots of walking each day and would find it hard to believe that she was out of rhythm at that time.  She failed cardioversion on sotalol and noted to be briefly bradycardic in June 2017.  New persistent A. fib so she so was started on amiodarone with failed cardioversion and converted to sinus rhythm in July with loading of amiodarone.  [place] has been maintaining sinus rhythm on amiodarone 200 mg p.o. daily.  [place] tells me that she was briefly anemic and treated with iron but off of both iron supplement and no longer anemic.  I see last hemoglobin was 15 and at the end of October in primary clinic.  She is on Eliquis 5 mg p.o. every 12 hours without problems.  She has generalized tremor and  tells me that tremor may be slightly worse since starting on amiodarone.  Medications by neurology have not helped.  She  has a follow-up appointment back there and I told her it is difficult to tell if amiodarone his made generalized tremor worse or not unless we took her off.  I do not want to do that right now given planned.  See above for more details.  [place] comes to clinic today and tells me that she is not sure she wants to do ablation.  See above for more details.  This visit will serve as history and physical for pulmonary vein isolation ablation with Dr. Miller on January 9.  See problem list for more details.  Medical, past medical, surgical and social history reviewed and updated.  Meds and allergies reviewed.   No personal or family history of adverse reactions to anesthesia or abnormal bleeding with surgery.  [place] has long a lot of longevity in her family and her parents both lived to be 90-95.  They had no significant medical history.      ______________________________________________________________________    Problem List:  Patient Active Problem List   Diagnosis     Varicose Veins     (Lower) Leg Localized Swelling Bilateral     Diverticulosis     Osteopenia     Asymptomatic Postmenopausal Status     Hyperlipidemia     Familial (Benign Essential) Tremor     Essential hypertension     Bunion     Basal cell carcinoma of skin     Family history of colon cancer     Hallux rigidus     Hammer toe     Pulmonary embolism     Persistent atrial fibrillation     Medical History:  Past Medical History:   Diagnosis Date     Atrial fibrillation      DVT (deep vein thrombosis) in pregnancy 05/2017    rt leg from atrial fib     Familial tremor      PE (pulmonary thromboembolism) 05/2017    dvt rt leg and PE rt lung     Skin cancer      Surgical History:  Past Surgical History:   Procedure Laterality Date     CARDIOVERSION  06/26/2017    sinus x 10 min, then back to afib     DILATION AND CURETTAGE OF UTERUS        FOOT SURGERY       MOHS SURGERY       Social History:  Social History   Substance Use Topics     Smoking status: Never Smoker     Smokeless tobacco: Never Used     Alcohol use 0.6 oz/week     1 Glasses of wine per week      Comment: occasional        Review of Systems: Review of Systems:   General: WNL  Eyes: WNL  Ears/Nose/Throat: WNL  Lungs: WNL  Heart: WNL  Stomach: WNL  Bladder: WNL  Muscle/Joints: WNL  Skin: WNL  Nervous System: WNL  Mental Health: WNL     Blood: WNL      Family History:  Family History   Problem Relation Age of Onset     Hypertension Mother      Colon cancer Sister      No Medical Problems Father      Atrial fibrillation Brother      Pacemaker Brother      No Medical Problems Sister      Prostate cancer Brother          Allergies:  No Known Allergies  Medications:  Current Outpatient Prescriptions   Medication Sig Dispense Refill     amiodarone (PACERONE) 200 MG tablet TAKE 1 TABLET BY MOUTH EVERY DAY 90 tablet 2     APIXABAN (ELIQUIS ORAL) Take 5 mg by mouth 2 (two) times a day.        clonazePAM (KLONOPIN) 0.5 MG tablet Take 0.5 mg by mouth 2 (two) times a day. 7:30 am + 2:30 pm, for tremor.       Lactobacillus rhamnosus GG (CULTURELLE) 10-15 Billion cell capsule Take 1 capsule by mouth daily as needed.       magnesium glycinate 100 mg Tab Take 300 mg by mouth at bedtime. Leg cramps       melatonin-pyridoxine HCl, B6, 3-1 mg Tab Take 0.5-1 tablets by mouth at bedtime as needed.       multivitamin therapeutic (THERAGRAN) tablet Take 1 tablet by mouth daily as needed.       OMEGA-3/DHA/EPA/FISH OIL (FISH OIL-OMEGA-3 FATTY ACIDS) 300-1,000 mg capsule Take 1 g by mouth daily as needed.        UNABLE TO FIND Med Name: Curcumin       No current facility-administered medications for this visit.      Facility-Administered Medications Ordered in Other Visits   Medication Dose Route Frequency Provider Last Rate Last Dose     sodium chloride 0.9%    Continuous PRN Kimberley Alcaraz CRNA      "      Objective:   Vital signs:  /67 (Patient Site: Left Arm, Patient Position: Sitting, Cuff Size: Adult Regular)  Pulse 60  Resp 16  Ht 5' 4\" (1.626 m)  Wt 144 lb 9.6 oz (65.6 kg)  BMI 24.82 kg/m2      Physical Exam:    GENERAL APPEARANCE: Alert, cooperative and in no acute distress.  HEENT: No scleral icterus. No Xanthelasma. Oral mucuos membranes pink and moist.  NECK: JVP Nl.   CHEST: clear to auscultation  CARDIOVASCULAR: S1, S2 without murmur ,clicks or rubs. Regular, regular.  Radial and posterior tibial pulses are intact and symetric.   EXTREMITIES: No cyanosis, clubbing or edema.    Results personally reviewed:  Results for orders placed during the hospital encounter of 08/30/17   Echo Complete [ECH10] 08/30/2017    Narrative 1. Normal left ventricular size and systolic performance with a visually   estimated ejection fraction of 55-60%.   2. No significant valvular heart disease is identified on this study.   3. Normal right ventricular size and systolic performance.   4. There is moderate left atrial enlargement. There is mild to moderate   right atrial enlargement.     When compared to the prior real-time echocardiogram dated 27 May 2017,   there has been an improvement in left ventricular systolic performance in   the degree of tricuspid insufficiency appears less on the current study.     May 2017 echo shows EF 45-50% and mild global hypokinesis.  Moderate biatrial enlargement.  Elevated central venous pressures.  RV systolic function appears mildly reduced.  Moderate tricuspid insufficiency.  June 2017 24-hour Holter shows persistent A. fib throughout with average ventricular response of 110.  No significant pauses or bradycardia.  Moderately frequent PVCs with a total of 802 in 24 hours.  This was done on metoprolol tartrate 100 mg p.o. twice daily.  Results for orders placed or performed in visit on 01/03/18   ECG 12 lead with MUSE   Result Value Ref Range    SYSTOLIC BLOOD PRESSURE  " mmHg    DIASTOLIC BLOOD PRESSURE  mmHg    VENTRICULAR RATE 59 BPM    ATRIAL RATE 59 BPM    P-R INTERVAL 124 ms    QRS DURATION 78 ms    Q-T INTERVAL 464 ms    QTC CALCULATION (BEZET) 459 ms    P Axis 72 degrees    R AXIS 66 degrees    T AXIS 46 degrees    MUSE DIAGNOSIS       Sinus bradycardia  Otherwise normal ECG  When compared with ECG of 18-AUG-2017 11:07,  No significant change was found         TSH:   Lab Results   Component Value Date    TSH 3.99 10/30/2017     BNP:   Lab Results   Component Value Date     (H) 05/26/2017     BMP:  Lab Results   Component Value Date    CREATININE 1.07 10/30/2017    BUN 23 (H) 10/30/2017     10/30/2017    K 4.7 10/30/2017     10/30/2017    CO2 28 10/30/2017       This note has been dictated using voice recognition software. Any grammatical or context distortions are unintentional and inherent to the software.    MELANIA VICK RN, [facility] HEART [facility]  [phone]

## 2021-06-15 NOTE — PROGRESS NOTES
Pt was seen in clinic for post-op PVI with Dr Miller on 1/9/18.     Pts VSS, wt stable, LSC, heart rate regular, heart sounds S1 and S2 present and palpable radial and pedal pulses. Pt denies generalized or localized pain abnormal to healing s/p, difficulty swallowing, SOB, thoat pain, heart burn, chest pain and or s/s of infection. Pt istolerating advancement in activity well, staying well hydrated, has a good appetite and eating well balanced meals. Pt denies palpitations, irregularities in HR or rhythm and or sensations of A-Fib.    Pts right groin has 2 puncture sites, is C/D/I, no drainage, small amount of bruising noted around puncture sites, no sutures in place, groin is soft, and pt denies pain at the site. Left groin has 2 puncture sites, is C/D/I, no drainage, small amount of amount of bruising noted around puncture site, no sutures in place, groin is soft, and pt denies pain at the site. No bruits we heard bilaterally, and pt has strong bilateral femoral and pedal pulses present. All puncture sites were left open to air.    Anticoagulation/Medication reviewed with pt:  Pt was instructed to remain on Eliquis without interruption until seen for 3mo follow-up, for further instructions/managment.  Reviewed with pt importance of anticoagulation s/p PVI, reviewed with pt s/s of bleeding while on anticoagulation s/p PVI and encouraged to call with any questions or concerns about anticoagulants  Pt will continue ASA 81mg Daily for 1 mo s/p PVI    Education completed with pt at this visit:  Reviewed post-op PVI healing process, f/u requirements, physical restrictions, nutritional requirements, when to contact EP-RN/EP-MD, contact information was given to the pt for further concerns or questions and pt verbalized understanding

## 2021-06-15 NOTE — ANESTHESIA PREPROCEDURE EVALUATION
Anesthesia Evaluation      Patient summary reviewed   No history of anesthetic complications     Airway   Mallampati: II  Neck ROM: full   Pulmonary - negative ROS and normal exam                          Cardiovascular   Exercise tolerance: > or = 4 METS  (+) hypertension, dysrhythmias, , hypercholesterolemia,     ECG reviewed  Rhythm: irregular  Rate: abnormal,         Neuro/Psych - negative ROS     Endo/Other - negative ROS      GI/Hepatic/Renal - negative ROS      Other findings: Varicose Veins     (Lower) Leg Localized Swelling Bilateral    Diverticulosis    Osteopenia    Asymptomatic Postmenopausal Status    Hyperlipidemia    Familial (Benign Essential) Tremor    Essential hypertension    Bunion    Basal cell carcinoma of skin    Family history of colon cancer    Hallux rigidus    Hammer toe    Pulmonary embolism    Persistent atrial fibrillation          Dental - normal exam                        Anesthesia Plan  Planned anesthetic: general endotracheal  50 mg ketamine IV on induction.    ASA 2   Induction: intravenous   Anesthetic plan and risks discussed with: patient    Post-op plan: routine recovery

## 2021-06-15 NOTE — ANESTHESIA POSTPROCEDURE EVALUATION
Patient: [place] Nava  EP Ablation PVI - GENERAL ANESTHESIA WHOLE CASE, 4HRS W/ICE, COLBY BOOKED, NO DEVICE, H&P 12/14, TEACH-ROCIO  Anesthesia type: general    Patient location: Telemetry/Step Down Unit  Last vitals:   Vitals:    01/09/18 1905   BP: 109/60   Pulse: 67   Resp: 18   Temp: 36.4  C (97.6  F)   SpO2: 99%     Post vital signs: stable  Level of consciousness: awake and responds to simple questions  Post-anesthesia pain: pain controlled  Post-anesthesia nausea and vomiting: no  Pulmonary: unassisted, return to baseline  Cardiovascular: stable and blood pressure at baseline  Hydration: adequate  Anesthetic events: no    QCDR Measures:  ASA# 11 - Ruthy-op Cardiac Arrest: ASA11B - Patient did NOT experience unanticipated cardiac arrest  ASA# 12 - Ruthy-op Mortality Rate: ASA12B - Patient did NOT die  ASA# 13 - PACU Re-Intubation Rate: ASA13B - Patient did NOT require a new airway mgmt  ASA# 10 - Composite Anes Safety: ASA10A - No serious adverse event    Additional Notes:

## 2021-06-16 NOTE — TELEPHONE ENCOUNTER
Pt having toe surgery 4/14/21 and is asking how long she should hold Eliquis.  Informed pt that recommendation would come from her surgeon, or primary care MD that is doing her pre-op exam.  Made sure pt understood that if she does hold Eliquis she will be at higher risk of developing a clot.  Pt verbalized understanding and appreciated the call back.  Offered my direct phone # to give to her surgeon if they have any questions.  laura

## 2021-06-19 NOTE — PROGRESS NOTES
[facility] Progress Note    Assessment:  1.  Atrial fibrillation with successful ablation January 2018 with Dr. Alis Wells.  She is on Eliquis with a chads vas score of 2.  She has had no clinical recurrence of atrial fibrillation.  She did have initial presentation with mild reduction in right ventricular and left ventricular systolic function with subsequent echocardiogram in August 2017 that demonstrated improvement in systolic function with normal left ventricular function and normal right ventricular function August 2017.  We are going to plan follow-up echocardiogram.  We talked about alternatives to Eliquis including the watchman device.  She is going to follow-up in the atrial fibrillation clinic.  We talked about the risks of Eliquis, the risks of bleeding and the interaction with alcohol.    2.  DVT/PE.  Difficult to determine if this was provoked or unprovoked.  She has been seen by hematology in the past but are not certain that there was clear-cut recommendations.  It would be helpful to know whether she needs to remain on anticoagulation for DVT PE long-term and will ask her primary care physician to comment and consider follow-up EP consultation.        Plan: As outlined above for the echocardiogram and follow-up in 6 months.  Follow-up in A. fib clinic in 1 month.    1. Paroxysmal atrial fibrillation (H)  Echo Complete   2. Other chronic pulmonary embolism without acute cor pulmonale (H)           An After Visit Summary was printed and given to the patient.    Subjective:    [place] Nava is a 68 y.o. female who returned for a planned  follow up visit.  We met in hospital May 2017 when she presented with DVT and pulmonary embolism and atrial fibrillation.  DVT was acute in the right tibial vein without evidence of acute cor pulmonale.  She had been placed on Eliquis.  The DVT occurred after some relatively brief travel.  She had reoccurrence of the atrial fibrillation and  subsequently underwent atrial fibrillation ablation in January 2018 with Dr. Alis Wells.  She had a successful result and has had no reoccurrence of atrial fibrillation.  She had come off amiodarone when she saw Dr. Alis Wells March 2018 and was to follow-up in 6 months.  She has a chads 2 Vascor of 2 and his recommendation was to stay on anticoagulation for the foreseeable future and repeat discussion and he was to see her back in follow-up.  Dr. Alis Wells is no longer practicing EP at [facility].  We talked about having her follow-up in the atrial fibrillation clinic.  Review of Systems:   General: WNL  Eyes: WNL  Ears/Nose/Throat: WNL  Lungs: WNL  Heart: WNL  Stomach: WNL  Bladder: WNL  Muscle/Joints: WNL  Skin: WNL  Nervous System: WNL  Mental Health: WNL     Blood: WNL      Problem List:    Patient Active Problem List   Diagnosis     Varicose Veins     (Lower) Leg Localized Swelling Bilateral     Diverticulosis     Osteopenia     Hyperlipidemia     Familial (Benign Essential) Tremor     Essential hypertension     Bunion     Basal cell carcinoma of skin     Family history of colon cancer     Hallux rigidus     Hammer toe     Pulmonary embolism (H)     Persistent atrial fibrillation (H)       Social History     Social History     Marital status: Single     Spouse name: N/A     Number of children: N/A     Years of education: N/A     Occupational History     retired           Social History Main Topics     Smoking status: Never Smoker     Smokeless tobacco: Never Used     Alcohol use 0.6 oz/week     1 Glasses of wine per week      Comment: occasional     Drug use: No     Sexual activity: Not on file      Comment: single      Other Topics Concern     Not on file     Social History Narrative       Family History   Problem Relation Age of Onset     Hypertension Mother      Colon cancer Sister      No Medical Problems Father      Atrial fibrillation Brother      Pacemaker Brother      No Medical  "Problems Sister      Prostate cancer Brother        Current Outpatient Prescriptions   Medication Sig Dispense Refill     APIXABAN (ELIQUIS ORAL) Take 5 mg by mouth 2 (two) times a day.        clonazePAM (KLONOPIN) 0.5 MG tablet Take 0.5 mg by mouth every morning.        clonazePAM (KLONOPIN) 1 MG tablet Take 1 mg by mouth every evening.       diphenhydrAMINE (BENADRYL) 25 mg tablet Take 12.5 mg by mouth at bedtime as needed for sleep.       ergocalciferol (VITAMIN D2) 50,000 unit capsule Take 50,000 Units by mouth once a week.       Lactobacillus rhamnosus GG (CULTURELLE) 10-15 Billion cell capsule Take 1 capsule by mouth 2 (two) times a day with meals.        magnesium glycinate 100 mg Tab Take 200 mg by mouth at bedtime. Leg cramps        melatonin 5 mg Tab tablet Take 5 mg by mouth at bedtime as needed (sleep).       multivitamin therapeutic (THERAGRAN) tablet Take 1 tablet by mouth daily.        OMEGA-3/DHA/EPA/FISH OIL (FISH OIL-OMEGA-3 FATTY ACIDS) 300-1,000 mg capsule Take 1 g by mouth daily.        TURMERIC ROOT EXTRACT ORAL Take 1 capsule by mouth 2 (two) times a week.       No current facility-administered medications for this visit.      Facility-Administered Medications Ordered in Other Visits   Medication Dose Route Frequency Provider Last Rate Last Dose     sodium chloride 0.9%    Continuous PRN Kimberley Alcaraz CRNA           Objective:     /80 (Patient Site: Right Arm, Patient Position: Sitting, Cuff Size: Adult Regular)  Pulse 72  Resp 16  Ht 5' 4\" (1.626 m)  Wt 142 lb (64.4 kg)  BMI 24.37 kg/m2  142 lb (64.4 kg)       Physical Exam:    GENERAL APPEARANCE: alert, no apparent distress  HEENT: no scleral icterus or xanthelasma  NECK: jugular venous pressure within normal limits  CHEST: symmetric, the lungs are clear to auscultation  CARDIOVASCULAR: regular rhythm without murmur, click, or gallop; no carotid bruits  Abdomen: No Organomegaly, masses, bruits, or tenderness. Bowels " sounds are present      EXTREMITIES: no cyanosis, clubbing or edema    Cardiac Testing:  Procedure date: 2018     CSN: [id number]        PCP: Carolann Okeefe MD     : Monica Miller MD     Impression:    Successful pulmonary vein isolation utilizing a Tacticath ablation. Entrance and exit block was confirmed from all 4 veins.    Successful substrate modification of the left atrial posterior wall and septum    Intracardiac echo was used at the completion of the procedure and confirmed that no effusion or tamponade was present.           Recommendations:    The patient will be admitted to the CCU for ongoing post anesthesia care.  The patient received prolonged anesthesia and remains fully anticoagulated with multiple anticoagulant agents.  Following recovery the patient will be transitioned to telemetry status.    Oral anticoagulant therapy: Eliquis will be continued.    Antiarrhythmic drug therapy: amiodarone will be continued until 3 month follow up with myself.  I would still like a 2 week monitor to be placed at her 6 week visit with EP NP.    Anticipate the patient will ambulate later this evening.  Discharge will likely occur tomorrow.  Echo Complete   Order# [id number]    Reading physician: Ranjana Bonilla MD   Ordering physician: Yung Smyth MD   Study date: 17         Patient Information      Patient Name MRN Sex              Age     [place] Nava [id number] Female 1950 (68 y.o.)       Indications      Dx: Atrial fibrillation (H) [I48.91 (ICD-10-CM)]       Summary      1. Normal left ventricular size and systolic performance with a visually estimated ejection fraction of 55-60%.   2. No significant valvular heart disease is identified on this study.   3. Normal right ventricular size and systolic performance.   4. There is moderate left atrial enlargement. There is mild to moderate right atrial enlargement.      When compared to the prior real-time echocardiogram  dated 27 May 2017, there has been an improvement in left ventricular systolic performance in the degree of tricuspid insufficiency appears less on the current study.              Collected: 03/27/18 1041     Resulting lab: [facility]     Value: Normal sinus rhythm   Anterior infarct , age undetermined   Abnormal ECG   When compared with ECG of 03-JAN-2018 11:32,   No significant change was found   Confirmed by JOSE MARR MD LOC: ([id number]) on 3/28/2018 2:07:11 PM        Comment:        Lab Results:    Lab Results   Component Value Date     01/03/2018    K 4.5 01/03/2018     01/03/2018    CO2 29 01/03/2018    BUN 23 (H) 01/03/2018    CREATININE 0.95 01/03/2018    CALCIUM 9.5 01/03/2018     Lab Results   Component Value Date    CHOL 205 (H) 06/16/2017    TRIG 86 06/16/2017    HDL 54 06/16/2017     BNP (pg/mL)   Date Value   05/26/2017 662 (H)     Creatinine (mg/dL)   Date Value   01/03/2018 0.95   10/30/2017 1.07   07/14/2017 1.28 (H)   05/29/2017 0.82     LDL Calculated (mg/dL)   Date Value   06/16/2017 134 (H)   03/17/2014 174 (H)   05/22/2012 137.8 (H)     Lab Results   Component Value Date    WBC 5.7 01/03/2018    HGB 15.5 01/03/2018    HCT 46.5 01/03/2018    MCV 95 01/03/2018     01/03/2018               This note has been dictated using voice recognition software. Any grammatical or context distortions are unintentional and inherent to the software.      Yung Smyth M.D., F.A.C.C.  [facility]  [phone]

## 2021-06-25 NOTE — PROGRESS NOTES
Progress Notes by Monica Miller MD at 9/27/2017 10:50 AM     Author: Monica Miller MD Service: -- Author Type: Physician    Filed: 9/27/2017  1:06 PM Encounter Date: 9/27/2017 Status: Signed    : Monica Miller MD (Physician)                 Arrhythmia Clinic    Thank you, Dr. Carolann Okeefe MD, for asking the [facility] Arrhythmia team to evaluate [place] Nava in consultation for atrial fibrillation      Assessment:     67 year old female with history of persistent atrial fibrillation leading to heart failure and reduced ejection fraction presents for evaluation     Plan:     Atrial fibrillation - Persistent and symptomatic with decreased ejection fraction, heart failure, and tachycardia.  She has a chads2-vasc of 2 and is appropriately anticoagulated with eliquis.  She has failed sotalol and is only partially maintained in sinus rhythm despite the use of amiodarone.  I reviewed the pathophysiology of atrial fibrillation in detail.  Additionally, I reviewed the rationale for rhythm control strategy given her clear depression of ejection fraction and heart failure symptoms despite adequate rate control.  Treatment options are quite limited but were reviewed today.  Given only partial success with amiodarone, I suspect she has only two treatment options.  The first is pulmonary vein isolation in conjunction with significant substrate modification and the possibility of long term AAD therapy in addition.  The second was biventricular pacemaker implantation and AV node ablation.    Ablation procedure was reviewed in detail.  Given her breakthrough on amiodarone, I quoted no better than a 65% chance of success with a single procedure, and did indicate that she would likely need AAD therapy in addition to ablation.  Ablation risks were reviewed in detail.  These include but are not limited to: bleeding, bruising, pain, infection, cardiac perforation, heart  attack, stroke, PV stenosis, esophageal injury or death.  [place] verbalized understanding.  She states her brother had an ablation and suffered cardiac perforation.    Pacemaker implant procedure and AV node ablation were reviewed briefly.  [place] indicated this would not be her preferred option at this time.    After discussion, [place] has indicated she would like to attempt ablation.  She does have some travel planned in the next 1-2 months.  We will plan to contact her and arrange PVI at her convenience around her travel plan.    I recommend lifelong anticoagulation.  I suspect we will maintain [place] on amiodarone for at least 2-3 months post ablation and then consider discontinuing this to see if she is able to maintain sinus rhythm without AAD.     Current History:   [place] Nava is a 67 y.o. female with a history of DVT and PE, familial tremor, and persistent atrial fibrillation.  She was in her normal state of health until May of this year when she developed lower extremity edema and presented to the hospital.  She was found to be in persistent atrial fibrillation with RVR and echo demosntrated mildly depressed EF and at least moderate TR.  She was started on sotalol and cardioverted, which was unsuccessful.  She was rate controlled with metoprolol, but continued to have significant symptoms.  Therefore, she was loaded with amiodarone and underwent a second cardioversion, which was successful.  Her EF has improved and TR is essentially gone.  Her symptoms are much better now.  However, holter monitor demonstrated recurrent paroxysmal atrial fibrillation with RVR which is largely asymptomatic.  Given these findings, she is referred today for further evaluation.    Past Medical History:     Past Medical History:   Diagnosis Date   ? Atrial fibrillation    ? DVT (deep vein thrombosis) in pregnancy 05/2017    rt leg from atrial fib   ? Familial tremor    ? PE (pulmonary thromboembolism) 05/2017    dvt rt leg and PE  rt lung   ? Skin cancer        Past Surgical History:     Past Surgical History:   Procedure Laterality Date   ? CARDIOVERSION  06/26/2017    sinus x 10 min, then back to afib   ? DILATION AND CURETTAGE OF UTERUS     ? FOOT SURGERY     ? MOHS SURGERY         Family History:     Family History   Problem Relation Age of Onset   ? Hypertension Mother    ? Colon cancer Sister    ? No Medical Problems Father    ? Atrial fibrillation Brother    ? Pacemaker Brother    ? No Medical Problems Sister        Social History:    reports that she has never smoked. She has never used smokeless tobacco. She reports that she drinks about 0.6 oz of alcohol per week  She reports that she does not use illicit drugs.    Meds:     Current Outpatient Prescriptions:   ?  amiodarone (PACERONE) 200 MG tablet, Take 1 tablet (200 mg total) by mouth daily., Disp: 90 tablet, Rfl: 3  ?  APIXABAN (ELIQUIS ORAL), Take 5 mg by mouth 2 (two) times a day. , Disp: , Rfl:   ?  clonazePAM (KLONOPIN) 0.5 MG tablet, Take 0.5 mg by mouth 2 (two) times a day. 7:30 am + 2:30 pm, for tremor., Disp: , Rfl:   ?  Lactobacillus rhamnosus GG (CULTURELLE) 10-15 Billion cell capsule, Take 1 capsule by mouth daily as needed., Disp: , Rfl:   ?  magnesium glycinate 100 mg Tab, Take 300 mg by mouth at bedtime. Leg cramps, Disp: , Rfl:   ?  multivitamin therapeutic (THERAGRAN) tablet, Take 1 tablet by mouth daily as needed., Disp: , Rfl:   ?  OMEGA-3/DHA/EPA/FISH OIL (FISH OIL-OMEGA-3 FATTY ACIDS) 300-1,000 mg capsule, Take 1 g by mouth daily as needed. , Disp: , Rfl:   ?  UNABLE TO FIND, Med Name: Curcumin, Disp: , Rfl:   ?  amiodarone (PACERONE) 200 MG tablet, TAKE 1 TABLET BY MOUTH EVERY DAY, Disp: 90 tablet, Rfl: 2  ?  ferrous sulfate 325 (65 FE) MG tablet, Take 1 tablet by mouth daily with breakfast., Disp: , Rfl:   ?  melatonin-pyridoxine HCl, B6, 3-1 mg Tab, Take 0.5-1 tablets by mouth at bedtime as needed., Disp: , Rfl:   ?  metoprolol tartrate (LOPRESSOR) 50 MG  "tablet, , Disp: , Rfl:   No current facility-administered medications for this visit.     Facility-Administered Medications Ordered in Other Visits:   ?  sodium chloride 0.9%, , , Continuous PRN, Kimberley Alcarza CRNA    Allergies:   Review of patient's allergies indicates no known allergies.    Review of Systems:   A full 12 point review of systems without pertinent positives except as per HPI or below.        Objective:      Physical Exam  Weight: Weight: 138 lb 11.2 oz (62.9 kg)  Body mass index is 23.81 kg/(m^2).  /90 (Patient Site: Left Arm, Patient Position: Sitting, Cuff Size: Adult Regular)  Pulse 66  Resp 16  Ht 5' 4\" (1.626 m)  Wt 138 lb 11.2 oz (62.9 kg)  BMI 23.81 kg/m2    General Appearance:   Nad, alert and oriented x 3   HEENT:  Mmm, perrl   Neck: No goiter noted   Chest: No deformities noted   Lungs:   Clear bilaterally   Cardiovascular:   RRR   Abdomen:  Soft and non-tender   Extremities: No clubbing or edema is noted.  She does have a mild LUE tremor.   Skin: No rashes are noted                 Cardiographics  Holter monitor 8/17 personally reviewed as above  Echo 8/17 personally reviewed as above  Note from Dr. Smyth 8/17 personally reviewed  Note from Chriss Noel NP 7/17 personally reviewed    Imaging  None    Lab Review   Lab Results   Component Value Date     07/14/2017    K 4.4 07/14/2017     07/14/2017    CO2 21 (L) 07/14/2017    BUN 30 (H) 07/14/2017    CREATININE 1.28 (H) 07/14/2017    CALCIUM 9.2 07/14/2017     Lab Results   Component Value Date    WBC 5.9 06/16/2017    HGB 12.4 07/21/2017    HCT 37.9 06/16/2017    MCV 76 (L) 06/16/2017     06/16/2017     Lab Results   Component Value Date    CHOL 205 (H) 06/16/2017    TRIG 86 06/16/2017    HDL 54 06/16/2017         Monica Miller MD  [facility] Card[facility], Electrophysiology         "

## 2021-06-25 NOTE — PROGRESS NOTES
Progress Notes by Baylee Ozuna, RN at 10/2/2017  2:17 PM     Author: Baylee Ozuna RN Service: -- Author Type: Registered Nurse    Filed: 10/2/2017  2:25 PM Encounter Date: 10/2/2017 Status: Signed    : Baylee Ozuna RN (Registered Nurse)       Noted.  Orders placed.      MD Baylee Lemus RN                     [facility].  Continue amio.  YK            Previous Messages       ----- Message -----      From: Baylee Ozuna RN      Sent: 10/2/2017  10:00 AM        To: MD Dr. Angela Lemus,   You saw this patient in clinic on 9-27-17 for persistent afib, currently having breakthrough on Amiodarone, you agreed to a PVI.   Could you please tell me if you would rather use [facility] or [facility]?   Also, I am assuming that you would like to continue Amiodarone through the procedure without holding.   Thank you,   Baylee

## 2021-06-26 NOTE — PROGRESS NOTES
Progress Notes by Monica Miller MD at 3/27/2018 10:30 AM     Author: Monica Miller MD Service: -- Author Type: Physician    Filed: 3/27/2018 11:05 AM Encounter Date: 3/27/2018 Status: Signed    : Monica Miller MD (Physician)                 Arrhythmia Clinic    Thank you, Dr. Carolann Okeefe MD, for asking the [facility] Arrhythmia team to evaluate [place] Nava in follow up for atrial fibrillation post ablation      Assessment:     67 year old female with persistent atrial fibrillation presents in 3 month follow up post ablation     Plan:     Atrial fibrillation - persistent and symptomatic.  chads2-vasc of 2 and appropriately on eliquis at this time.  She underwent ablation 3 months ago and has no symptoms or signs of recurrent atrial arrhythmias.  2 week symptom monitor demonstrates no AF/AFL and CT-PV demonstrates normal PV anatomy.  She is very pleased with results.    Recommend continuation of oral anticoagulation for the foreseeable future.    Stop amiodarone today.    Follow up in 6 months or earlier as needed.    Tremor - Question of amiodarone related.  Amiodarone has been discontinued today and we will see her in follow up to see if there has been any improvement in 6 months.     Current History:   [place] Nava is a 67 y.o. female with a history of DVT/PE, and persistent atrial fibrillation.  She was maintained on amiodarone for some time but developed breakthroughs despite this therapy.  She was then referred for ablation and has had no complication since that time.  She healed well and has had no symptomatic breakthroughs of atrial arrhythmias.  She wore a 2 week monitor which demonstrates no silent arrhythmia breakthrough.  CT-Pulmonary vein demonstrates no stenosis.  She presents today in follow up.  Her only real concern is her tremor which her neurologist felt may be due to amiodarone therapy.    Past Medical History:     Past Medical  History:   Diagnosis Date   ? Atrial fibrillation    ? DVT (deep vein thrombosis) in pregnancy 05/2017    rt leg from atrial fib   ? Familial tremor    ? PE (pulmonary thromboembolism) 05/2017    dvt rt leg and PE rt lung   ? Skin cancer        Past Surgical History:     Past Surgical History:   Procedure Laterality Date   ? CARDIOVERSION  06/26/2017    sinus x 10 min, then back to afib   ? DILATION AND CURETTAGE OF UTERUS     ? EP ABLATION PVI Left 1/9/2018    Procedure: EP Ablation PVI;  Surgeon: Monica Miller MD;  Location: [facility];  Service:    ? FOOT SURGERY     ? MOHS SURGERY         Family History:     Family History   Problem Relation Age of Onset   ? Hypertension Mother    ? Colon cancer Sister    ? No Medical Problems Father    ? Atrial fibrillation Brother    ? Pacemaker Brother    ? No Medical Problems Sister    ? Prostate cancer Brother        Social History:    reports that she has never smoked. She has never used smokeless tobacco. She reports that she drinks about 0.6 oz of alcohol per week  She reports that she does not use illicit drugs.    Meds:     Current Outpatient Prescriptions:   ?  APIXABAN (ELIQUIS ORAL), Take 5 mg by mouth 2 (two) times a day. , Disp: , Rfl:   ?  clonazePAM (KLONOPIN) 0.5 MG tablet, Take 0.5 mg by mouth every morning. , Disp: , Rfl:   ?  clonazePAM (KLONOPIN) 1 MG tablet, Take 1 mg by mouth every evening., Disp: , Rfl:   ?  diphenhydrAMINE (BENADRYL) 25 mg tablet, Take 12.5 mg by mouth at bedtime as needed for sleep., Disp: , Rfl:   ?  ergocalciferol (VITAMIN D2) 50,000 unit capsule, Take 50,000 Units by mouth once a week., Disp: , Rfl:   ?  Lactobacillus rhamnosus GG (CULTURELLE) 10-15 Billion cell capsule, Take 1 capsule by mouth 2 (two) times a day with meals. , Disp: , Rfl:   ?  magnesium glycinate 100 mg Tab, Take 200 mg by mouth at bedtime. Leg cramps , Disp: , Rfl:   ?  melatonin 5 mg Tab tablet, Take 5 mg by mouth at bedtime as needed  "(sleep)., Disp: , Rfl:   ?  multivitamin therapeutic (THERAGRAN) tablet, Take 1 tablet by mouth daily. , Disp: , Rfl:   ?  OMEGA-3/DHA/EPA/FISH OIL (FISH OIL-OMEGA-3 FATTY ACIDS) 300-1,000 mg capsule, Take 1 g by mouth daily. , Disp: , Rfl:   ?  TURMERIC ROOT EXTRACT ORAL, Take 1 capsule by mouth 2 (two) times a week., Disp: , Rfl:   No current facility-administered medications for this visit.     Facility-Administered Medications Ordered in Other Visits:   ?  sodium chloride 0.9%, , , Continuous PRN, Kimberley Alcaraz CRNA    Allergies:   Review of patient's allergies indicates no known allergies.    Review of Systems:   A full 12 point review of systems without pertinent positives except as per HPI or below.        Objective:      Physical Exam  Weight: Weight: 147 lb 12.8 oz (67 kg) (shoes on)  Body mass index is 25.37 kg/(m^2).  /82 (Patient Site: Left Arm, Patient Position: Sitting, Cuff Size: Adult Regular)  Pulse 80  Resp 12  Ht 5' 4\" (1.626 m)  Wt 147 lb 12.8 oz (67 kg) Comment: shoes on  BMI 25.37 kg/m2    General Appearance:   Nad, alert and oriented x 3               Lungs:   ctab   Cardiovascular:   RRR   Abdomen:  Soft and non-tender   Extremities: No clubbing or edema    Skin: No rashes                 Cardiographics  ECG performed today personally reviewed - normal sinus rhythm  2 week symptom monitor personally reviewed as above  CT-PV personally reviewed as above    Imaging  None    Lab Review   Lab Results   Component Value Date     01/03/2018    K 4.5 01/03/2018     01/03/2018    CO2 29 01/03/2018    BUN 23 (H) 01/03/2018    CREATININE 0.95 01/03/2018    CALCIUM 9.5 01/03/2018     Lab Results   Component Value Date    WBC 5.7 01/03/2018    HGB 15.5 01/03/2018    HCT 46.5 01/03/2018    MCV 95 01/03/2018     01/03/2018     Lab Results   Component Value Date    CHOL 205 (H) 06/16/2017    TRIG 86 06/16/2017    HDL 54 06/16/2017         Monica Miller" MD  [facility] Cardiology, Electrophysiology

## 2021-06-26 NOTE — PROGRESS NOTES
Progress Notes by Allison Noel CNP at 9/12/2018 10:30 AM     Author: Allison Noel CNP Service: -- Author Type: Nurse Practitioner    Filed: 9/12/2018 12:02 PM Encounter Date: 9/12/2018 Status: Signed    : Allison Noel CNP (Nurse Practitioner)          Click to link to [facility]     [facility] ELECTROPHYSIOLOGY NOTE      Assessment/Recommendations   Assessment/Plan:    Diagnoses and all orders for this visit:    Persistent atrial fibrillation (H) and essential tremor is much better off of amiodarone so listed amiodarone as drug intolerance.  She is off of amiodarone now for 6 months and  has felt her heart fluttering on 2 occasions and one is today.  The other occasion of heart fluttering was after biking in extreme heat of 90s and was sweating heavily.  She was drinking only water.  On physical exam every fifth or sixth beat is early so twelve-lead EKG done and verified that she is in sinus rhythm and likely PACs though none were seen at time of 12-lead EKG.  To call if symptoms of reoccurrence of atrial fibrillation.  Discussed that there is chances of reoccurrence of A. fib for 2 years post ablation and then risks drops.    -     ECG 12 lead with MUSE    Essential hypertension and well-controlled.  [id number] score of 3 and on Eliquis.  [place] is not having any bleeding problems or at high risk for bleeding on Eliquis but high risk for stroke so discussed with [place] that I recommend long-term anticoagulation with afibl.  I discussed that the watchman device does not cover for risk of DVT and PE.  This is the reason that Dr. Smyth has recommended that she see a hematologist to see if she needs to stay on anticoagulation with history of DVT and PE.  Since she is not concerned about getting the watchman device she has decided not to go to the hematologist.    Follow up in clinic with Dr. Smyth in 3 months and will follow with him only.  Discussed that I recommend  that she see Dr. Smyth every 3-4 months for the next year or so for rhythm check.  Will follow with EP only as recommended by Dr. Smyth.     History of Present Illness    Ms. [place] Nava is a very pleasant 68 y.o. female who comes in today for EP follow-up for persistent atrial fibrillation.  [place] Nava has a known history of persistent atrial fibrillation which was diagnosed in May 2017 when hospitalized with bilateral pulmonary embolus and DVT that was nonobstructive in right leg and in A. fib with RVR at the time.  She failed cardioversion on sotalol and noted to be briefly bradycardic after cardioversion.  Therefore she was started on amiodarone and converted to sinus rhythm in July of 2017 with loading of amiodarone.  She has maintained sinus rhythm since then.  [place] had pulmonary vein isolation ablation on 1/9/2018 with RF to 4 pulmonary veins plus posterior wall and septal ablation with Dr. Miller.  She was taken off of amiodarone in March.  She denies any cardiac symptoms and remains active exercising and biking.  She complains of heart fluttering on 2 occasions and one was today and see above.    Cardiographics (personally reviewed):  Results for orders placed during the hospital encounter of 08/09/18   Echo Complete [ECH10] 08/09/2018    Narrative   When compared to the previous study dated 8/30/2017, no significant   change.    Left ventricle ejection fraction is normal. The calculated left   ventricular ejection fraction is 61%.    Moderate left atrial enlargement    Normal right ventricular size and systolic function.    No hemodynamically significant valvular heart abnormalities.      March 2018 CT of pulmonary veins post PVI negative.  March 2018 2 weeks symptom monitor normal with no A. fib or flutter seen.  Note was on amiodarone at the time.    Results for orders placed or performed in visit on 09/12/18   ECG 12 lead with MUSE   Result Value Ref Range    SYSTOLIC BLOOD PRESSURE  mmHg     DIASTOLIC BLOOD PRESSURE  mmHg    VENTRICULAR RATE 61 BPM    ATRIAL RATE 61 BPM    P-R INTERVAL 106 ms    QRS DURATION 78 ms    Q-T INTERVAL 432 ms    QTC CALCULATION (BEZET) 434 ms    P Axis 70 degrees    R AXIS 71 degrees    T AXIS 45 degrees    MUSE DIAGNOSIS       Sinus rhythm with short NJ  Otherwise normal ECG  When compared with ECG of 27-MAR-2018 10:41,  Criteria for Anterior infarct are no longer Present            Problem List:  Patient Active Problem List   Diagnosis   ? Varicose Veins   ? (Lower) Leg Localized Swelling Bilateral   ? Diverticulosis   ? Osteopenia   ? Hyperlipidemia   ? Familial (Benign Essential) Tremor   ? Essential hypertension   ? Bunion   ? Basal cell carcinoma of skin   ? Family history of colon cancer   ? Hallux rigidus   ? Hammer toe   ? Pulmonary embolism (H)   ? Persistent atrial fibrillation (H)       Physical Examination Review of Systems   Vitals:    09/12/18 1039   BP: 118/70   Pulse: 62   Resp: 16     Body mass index is 24.34 kg/(m^2).  Wt Readings from Last 3 Encounters:   09/12/18 141 lb 12.8 oz (64.3 kg)   08/09/18 142 lb (64.4 kg)   07/25/18 142 lb (64.4 kg)     General Appearance:   Alert, well-appearing and in no acute distress.   HEENT: Atraumatic, normocephalic.  No scleral icterus, normal conjunctivae; mucous membranes pink and moist.     Chest: Chest symmetric, spine straight.   Lungs:   Respirations unlabored: Lungs are clear to auscultation.   Cardiovascular:   Normal first and second heart sounds with no murmurs, rubs, or gallops.  Slightly irregular.  Radial and posterior tibial pulses are intact.  Normal JVD, no edema.       Extremities: No cyanosis or clubbing   Musculoskeletal: Moves all extremities   Skin: Warm, dry, intact.    Neurologic: Mood and affect are appropriate, alert and oriented to person, place, time, and situation    General: WNL  Eyes: WNL  Ears/Nose/Throat: WNL  Lungs: WNL  Heart: WNL  Stomach: WNL  Bladder: WNL  Muscle/Joints: WNL  Skin:  WNL  Nervous System: WNL  Mental Health: WNL     Blood: WNL       Medical History  Surgical History Family History Social History   Past Medical History:   Diagnosis Date   ? Atrial fibrillation (H)    ? DVT (deep vein thrombosis) in pregnancy (H) 05/2017    rt leg from atrial fib   ? Familial tremor    ? PE (pulmonary thromboembolism) (H) 05/2017    dvt rt leg and PE rt lung   ? Skin cancer     Past Surgical History:   Procedure Laterality Date   ? CARDIOVERSION  06/26/2017    sinus x 10 min, then back to afib   ? DILATION AND CURETTAGE OF UTERUS     ? EP ABLATION PVI Left 1/9/2018    Procedure: EP Ablation PVI;  Surgeon: Monica Miller MD;  Location: [facility];  Service:    ? FOOT SURGERY     ? MOHS SURGERY      Family History   Problem Relation Age of Onset   ? Hypertension Mother    ? Colon cancer Sister    ? No Medical Problems Father    ? Atrial fibrillation Brother    ? Pacemaker Brother    ? No Medical Problems Sister    ? Prostate cancer Brother     Social History     Social History   ? Marital status: Single     Spouse name: N/A   ? Number of children: N/A   ? Years of education: N/A     Occupational History   ? retired           Social History Main Topics   ? Smoking status: Never Smoker   ? Smokeless tobacco: Never Used   ? Alcohol use 0.6 oz/week     1 Glasses of wine per week      Comment: occasional   ? Drug use: No   ? Sexual activity: Not on file      Comment: single      Other Topics Concern   ? Not on file     Social History Narrative          Medications  Allergies   Current Outpatient Prescriptions   Medication Sig Dispense Refill   ? APIXABAN (ELIQUIS ORAL) Take 5 mg by mouth 2 (two) times a day.      ? clonazePAM (KLONOPIN) 0.5 MG tablet Take 0.5 mg by mouth 2 (two) times a day as needed.      ? diphenhydrAMINE (BENADRYL) 25 mg tablet Take 12.5 mg by mouth at bedtime as needed for sleep.     ? ergocalciferol (VITAMIN D2) 50,000 unit capsule Take 50,000  Units by mouth once a week.     ? Lactobacillus rhamnosus GG (CULTURELLE) 10-15 Billion cell capsule Take 1 capsule by mouth 2 (two) times a day with meals.      ? magnesium glycinate 100 mg Tab Take 200 mg by mouth at bedtime. Leg cramps      ? melatonin 5 mg Tab tablet Take 5 mg by mouth at bedtime as needed (sleep).     ? multivitamin therapeutic (THERAGRAN) tablet Take 1 tablet by mouth daily.      ? OMEGA-3/DHA/EPA/FISH OIL (FISH OIL-OMEGA-3 FATTY ACIDS) 300-1,000 mg capsule Take 1 g by mouth daily.      ? TURMERIC ROOT EXTRACT ORAL Take 1 capsule by mouth 2 (two) times a week.       No current facility-administered medications for this visit.      Facility-Administered Medications Ordered in Other Visits   Medication Dose Route Frequency Provider Last Rate Last Dose   ? sodium chloride 0.9%    Continuous PRN Kimberley Alcaraz CRNA          Allergies   Allergen Reactions   ? Amiodarone      Tremors much worse on amiodarone      Medical, surgical, family, social history, and medications were all reviewed and updated as necessary.   Lab Results    Chemistry CBC/INR CHOLESTROL   Lab Results   Component Value Date    CREATININE 0.95 01/03/2018    BUN 23 (H) 01/03/2018     01/03/2018    K 4.5 01/03/2018     01/03/2018    CO2 29 01/03/2018     Creatinine (mg/dL)   Date Value   01/03/2018 0.95   10/30/2017 1.07   07/14/2017 1.28 (H)   05/29/2017 0.82     Lab Results   Component Value Date     (H) 05/26/2017    Lab Results   Component Value Date    WBC 5.7 01/03/2018    HGB 15.5 01/03/2018    HCT 46.5 01/03/2018    MCV 95 01/03/2018     01/03/2018     Lab Results   Component Value Date    INR 1.26 (H) 05/29/2017      Lab Results   Component Value Date    CHOL 205 (H) 06/16/2017    HDL 54 06/16/2017    LDLCALC 134 (H) 06/16/2017    TRIG 86 06/16/2017          Total Time- 25 minutes with greater than 50% spent talking to patient and family regarding patient's relevant diagnoses.  This  note has been dictated using voice recognition software. Any grammatical, typographical, or context distortions are unintentional and inherent to the software.    Allison Noel RN,  [facility]   Electrophysiology  [phone]

## 2021-06-26 NOTE — PROGRESS NOTES
Amada Inclusion/Exclusion Criteria:     Study Name: Amada   : Jeff Gonzalez MD    Protocol version: 1.2, 08 Mar 2021     Criteria # Inclusion Criteria (ALL MUST BE YES)  YES/NO   1 Able to read, understand, and provide written informed consent Yes   2 Willing and able to participate in the study procedures as described in the consent form  Yes   3 Individuals who are 22 years of age and older Yes   4 Able to communicate effectively with and follow instructions from the study staff Yes   5 A wrist circumference between 125-190 mm (subject must be able to wear the DCD)  Yes   6 For subjects enrolled into the AF population, subjects must have a known diagnosis of AF and be in AF at the time of screening. Subjects may have any type of AF including paroxysmal, persistent, or permanent AF, though persistent and permanent AF are preferred as subjects with persistent and permanent AF are more likely to be in AF at the time of screening.  yes       Criteria #  Exclusion Criteria (ALL MUST BE NO)  YES/NO    1 Physical disability that precludes safe an adequate testing  No   2 Mental impairment resulting in limited ability to cooperate  No   3 Subjects with a pacemaker or implantable cardioverter-defibrillator (ICD)  No   4 Acute myocardial infarction within 90 days of screening or other cardiovascular disease that, in the opinion of the Investigator, increases the risk to the subject or renders data uninterruptable (e.g. recent or ongoing unstable angina, significant valvular heart disease or heart failure, myocarditis, or pericarditis)  No   5 Acute pulmonary embolism, pulmonary infarction, or deep vein thrombosis within 90 days of screening  No   6 Stroke or transient ischemic attack within 90 days of screening No   7 Subjects taking rhythm control drugs (e.g., disopyramide, quinidine, flecainide, propafenone, amiodarone, dofetilide, dronedarone, sotalol, procainamide, ibutilide, moricizine, or  procainamide.) For clarity and in contrast, rate control, anticoagulants, and anti-platelet medications are permitted (e.g., metoprolol, atenolol, diltiazem, coumadin, clopidogrel, or aspirin)     Exception: subjects who are undergoing scheduled cardioversion for known AF within 30 days after study participation are allowed to participate in the study even when on rhythm control drugs  No   8 Symptomatic (or active) allergic skin reaction such as eczema, rosacea, impetigo, dermatomyositis, or allergic contact dermatitis on both wrists or over electrode attachment sites, including known allergy or sensitivity to silicone bands primarily used in wrist-worn fitness devices No   9 Known sensitivity to medical adhesives, isopropyl alcohol, watch bands, or ECG electrodes No   10 A history or abnormal life-threatening rhythms as determined by the Investigator (e.g., ventricular tachycardia, ventricular fibrillation, 3rd-degree heart block, resting heart rate < 50 bpm, or resting heart rate > 120 bpm)  No   11 Significant tremor that prevents subject from being able to hold still  yes   12 Women who are pregnant at the time of study participation  No   13 Subjects enrolled into the SR population must not have any diagnosis of AF. For example, subjects cannot be enrolled into the SR population if they have a diagnosis of paroxysmal AF but during screening are in SR No     Patient does not meet criteria of study

## 2021-06-27 NOTE — PROGRESS NOTES
Progress Notes by Allison Noel CNP at 12/27/2018 10:30 AM     Author: Allison Noel CNP Service: -- Author Type: Nurse Practitioner    Filed: 12/27/2018 11:34 AM Encounter Date: 12/27/2018 Status: Signed    : Allison Noel CNP (Nurse Practitioner)          Click to link to [facility]     [facility] HEART CARE ELECTROPHYSIOLOGY NOTE      Assessment/Recommendations   Assessment/Plan:    Diagnoses and all orders for this visit:    Persistent atrial fibrillation (H) and off of amiodarone for 9 months now with no clear symptoms of recurrence of atrial fibrillation.  Offered reassurance and not in persistent atrial fibrillation.  She is very sensitive to heart rhythm and cause of palpitations could be more frequent ectopy.  Could do symptom monitor if palpitations continue.  To call if she is in A. fib for days in a row.  Recommend she stay on metoprolol succinate 25 mg 1 tablet orally every day.    Essential hypertension and states only had one episode of being hypertensive at a neurology clinic.  This is been on her chart since before 2012, but not on any antihypertensive.  Will leave for now.    Other orders  -     calcium carbonate (CALCIUM 500 ORAL); Take 1,000 mg by mouth daily.    [id number] score of 3 and on Eliquis without any missed doses.  She has seen an article on the watchman device and the lady who got the device got it because she had a stroke with one missed dose of Eliquis.  This is worrying [place].  I discussed that Dr. Smyth has told her she needs to see a hematologist if she even wants to consider a watchman device.  At this point, she is not considering the watchman device.    Follow up in 3 months with Dr. Smyth and will cancel January appointment per patient request.     History of Present Illness    Ms. [place] Nava is a very pleasant 68 y.o. female who comes in today for urgent EP regarding more frequent palpitations.  [place] Nava has a known history  of persistent atrial fibrillation, which was first diagnosed in May 2017 when hospitalized with bilateral pulmonary emboli and DVT that was nonobstructive in right leg and in A. fib with RVR at the time.  She failed cardioversion on sotalol and noted to be briefly bradycardic after cardioversion.  Therefore, she was started on amiodarone and converted to sinus rhythm in July 2017 with loading of amiodarone.  She has maintained sinus rhythm since then.  [place] had pulmonary vein isolation ablation with Dr. Miller in January 2018 with RF to 4 pulmonary veins plus posterior wall and septal ablation.  She was taken off of amiodarone in March.  She denies any change in energy level or tolerance of activity.  Other than palpitations she denies any cardiac symptoms.  She complains of palpitations occasionally during the daytime but more commonly when she is had palpitations is been when she is laid down in bed after getting ready for bed and not fallen asleep yet.  It is not kept her awake.  She comes in for urgent appointment as she is concerned that she could be in a persistent atrial fibrillation.      Cardiographics (personally reviewed):  Results for orders placed during the hospital encounter of 08/09/18   Echo Complete [ECH10] 08/09/2018    Narrative   When compared to the previous study dated 8/30/2017, no significant   change.    Left ventricle ejection fraction is normal. The calculated left   ventricular ejection fraction is 61%.    Moderate left atrial enlargement    Normal right ventricular size and systolic function.    No hemodynamically significant valvular heart abnormalities.        March 2018 CT of pulmonary veins post PVI was negative for pulmonary  Stenosis.    March 2018 2 weeks symptom monitor normal with no A. fib or flutter seen note was on amiodarone at that time.    Results for orders placed or performed in visit on 09/12/18   ECG 12 lead with MUSE   Result Value Ref Range    SYSTOLIC BLOOD  PRESSURE  mmHg    DIASTOLIC BLOOD PRESSURE  mmHg    VENTRICULAR RATE 61 BPM    ATRIAL RATE 61 BPM    P-R INTERVAL 106 ms    QRS DURATION 78 ms    Q-T INTERVAL 432 ms    QTC CALCULATION (BEZET) 434 ms    P Axis 70 degrees    R AXIS 71 degrees    T AXIS 45 degrees    MUSE DIAGNOSIS       Sinus rhythm with short TX  Otherwise normal ECG  When compared with ECG of 27-MAR-2018 10:41,  Criteria for Anterior infarct are no longer Present  Confirmed by MICHAEL SHAW MD LOC: ([id number]) on 9/12/2018 4:09:23 PM            Problem List:  Patient Active Problem List   Diagnosis   ? Varicose Veins   ? (Lower) Leg Localized Swelling Bilateral   ? Diverticulosis   ? Osteopenia   ? Hyperlipidemia   ? Familial (Benign Essential) Tremor   ? Essential hypertension   ? Bunion   ? Basal cell carcinoma of skin   ? Family history of colon cancer   ? Hallux rigidus   ? Hammer toe   ? Pulmonary embolism (H)   ? Persistent atrial fibrillation (H)       Physical Examination Review of Systems   Vitals:    12/27/18 1031   BP: 104/70   Pulse: 72   Resp: 12     Body mass index is 24.67 kg/m .  Wt Readings from Last 3 Encounters:   12/27/18 143 lb 11.2 oz (65.2 kg)   09/12/18 141 lb 12.8 oz (64.3 kg)   08/09/18 142 lb (64.4 kg)     General Appearance:   Alert, well-appearing and in no acute distress.   HEENT: Atraumatic, normocephalic.  No scleral icterus, normal conjunctivae; mucous membranes pink and moist.     Chest: Chest symmetric, spine straight.   Lungs:   Respirations unlabored: Lungs are clear to auscultation.   Cardiovascular:   Normal first and second heart sounds with no murmurs, rubs, or gallops.  Regular, regular.  Radial and posterior tibial pulses are intact.  Normal JVD, no edema.       Extremities: No cyanosis or clubbing   Musculoskeletal: Moves all extremities   Skin: Warm, dry, intact.    Neurologic: Mood and affect are appropriate, alert and oriented to person, place, time, and situation    General: WNL  Eyes:  WNL  Ears/Nose/Throat: WNL  Lungs: WNL  Heart: Shortness of Breath with activity, Irregular Heartbeat  Stomach: WNL  Bladder: WNL  Muscle/Joints: WNL  Skin: WNL  Nervous System: WNL  Mental Health: WNL     Blood: WNL       Medical History  Surgical History Family History Social History   Past Medical History:   Diagnosis Date   ? Atrial fibrillation (H)    ? DVT (deep vein thrombosis) in pregnancy (H) 05/2017    rt leg from atrial fib   ? Familial tremor    ? PE (pulmonary thromboembolism) (H) 05/2017    dvt rt leg and PE rt lung   ? Skin cancer     Past Surgical History:   Procedure Laterality Date   ? CARDIOVERSION  06/26/2017    sinus x 10 min, then back to afib   ? DILATION AND CURETTAGE OF UTERUS     ? EP ABLATION PVI Left 1/9/2018    Procedure: EP Ablation PVI;  Surgeon: Monica Miller MD;  Location: [facility];  Service:    ? FOOT SURGERY     ? MOHS SURGERY      Family History   Problem Relation Age of Onset   ? Hypertension Mother    ? Colon cancer Sister    ? No Medical Problems Father    ? Atrial fibrillation Brother    ? Pacemaker Brother    ? No Medical Problems Sister    ? Prostate cancer Brother     Social History     Socioeconomic History   ? Marital status: Single     Spouse name: Not on file   ? Number of children: Not on file   ? Years of education: Not on file   ? Highest education level: Not on file   Social Needs   ? Financial resource strain: Not on file   ? Food insecurity - worry: Not on file   ? Food insecurity - inability: Not on file   ? Transportation needs - medical: Not on file   ? Transportation needs - non-medical: Not on file   Occupational History   ? Occupation: retired     Comment:    Tobacco Use   ? Smoking status: Never Smoker   ? Smokeless tobacco: Never Used   Substance and Sexual Activity   ? Alcohol use: Yes     Alcohol/week: 0.6 oz     Types: 1 Glasses of wine per week     Comment: occasional   ? Drug use: No   ? Sexual activity: Not on  file     Comment: single    Other Topics Concern   ? Not on file   Social History Narrative   ? Not on file          Medications  Allergies   Current Outpatient Medications   Medication Sig Dispense Refill   ? APIXABAN (ELIQUIS ORAL) Take 5 mg by mouth 2 (two) times a day.      ? calcium carbonate (CALCIUM 500 ORAL) Take 1,000 mg by mouth daily.     ? clonazePAM (KLONOPIN) 0.5 MG tablet Take 0.5 mg by mouth 2 (two) times a day as needed.      ? ergocalciferol (VITAMIN D2) 50,000 unit capsule Take 50,000 Units by mouth once a week.     ? Lactobacillus rhamnosus GG (CULTURELLE) 10-15 Billion cell capsule Take 1 capsule by mouth 2 (two) times a day with meals.      ? magnesium glycinate 100 mg Tab Take 200 mg by mouth at bedtime. Leg cramps      ? melatonin 5 mg Tab tablet Take 2.5 mg by mouth at bedtime as needed (sleep).            ? metoprolol succinate (TOPROL XL) 25 MG Take 1 tablet (25 mg total) by mouth daily. 30 tablet 3   ? multivitamin therapeutic (THERAGRAN) tablet Take 1 tablet by mouth daily.      ? OMEGA-3/DHA/EPA/FISH OIL (FISH OIL-OMEGA-3 FATTY ACIDS) 300-1,000 mg capsule Take 1 g by mouth daily.      ? TURMERIC ROOT EXTRACT ORAL Take 1 capsule by mouth 2 (two) times a week.       No current facility-administered medications for this visit.      Facility-Administered Medications Ordered in Other Visits   Medication Dose Route Frequency Provider Last Rate Last Dose   ? sodium chloride 0.9%    Continuous PRN Kimberley Alcaraz CRNA          Allergies   Allergen Reactions   ? Amiodarone      Tremors much worse on amiodarone      Medical, surgical, family, social history, and medications were all reviewed and updated as necessary.   Lab Results    Chemistry CBC/INR CHOLESTROL   Lab Results   Component Value Date    CREATININE 0.95 01/03/2018    BUN 23 (H) 01/03/2018     01/03/2018    K 4.5 01/03/2018     01/03/2018    CO2 29 01/03/2018     Creatinine (mg/dL)   Date Value   01/03/2018 0.95    10/30/2017 1.07   07/14/2017 1.28 (H)   05/29/2017 0.82     Lab Results   Component Value Date     (H) 05/26/2017    Lab Results   Component Value Date    WBC 5.7 01/03/2018    HGB 15.5 01/03/2018    HCT 46.5 01/03/2018    MCV 95 01/03/2018     01/03/2018     Lab Results   Component Value Date    INR 1.26 (H) 05/29/2017      Lab Results   Component Value Date    CHOL 205 (H) 06/16/2017    HDL 54 06/16/2017    LDLCALC 134 (H) 06/16/2017    TRIG 86 06/16/2017          Total Time- 25 minutes with greater than 50% spent talking to patient and family regarding patient's relevant diagnoses.  This note has been dictated using voice recognition software. Any grammatical, typographical, or context distortions are unintentional and inherent to the software.    Allison Noel RN,  [facility]   Electrophysiology  [phone]

## 2021-06-28 NOTE — PROGRESS NOTES
Progress Notes by Kemi Landrum at 10/31/2019 10:56 AM     Author: Kemi Landrum Service: -- Author Type: Research Associate    Filed: 10/31/2019 10:57 AM Encounter Date: 10/31/2019 Status: Signed    : Kemi Landrum (Research Associate)         Zestar Study Device Return    [place] Nava returned all the devices for the Zestar study.  [place] Nava denies medication changes or adverse events since last visit.    [place] Nava has now completed their participation in the Zestar study.   Thank you for your gift of participation.    Kemi Landrum

## 2021-06-28 NOTE — PROGRESS NOTES
Progress Notes by Nessa Sandy PA-C at 10/28/2019 12:30 PM     Author: Nessa Sandy PA-C Service: -- Author Type: Physician Assistant    Filed: 10/28/2019  3:36 PM Encounter Date: 10/28/2019 Status: Signed    : Nessa Sandy PA-C (Physician Assistant)        Zestar Study    Physical Examination  For abnormal findings, please evaluate if the finding is Clinically Significant (by 'CS') or Not Clinically Significant (by 'NCS')  General Appearance Normal  Head and Neck  Normal  Lungs    Normal  Cardiovascular  Normal  Abdomen   Normal  Musculoskeletal/Extremities Normal   Lymph Nodes  Normal  Skin    Normal  Neurological   Normal   Tremor absent     If present, evaluate severity on 1-10 scale    Nessa Sandy PA-C

## 2021-06-28 NOTE — PROGRESS NOTES
Last checked, his LDL was over 100.  To repeat blood tests.  Advised to do so now.   Progress Notes by Lili Callaway EPS at 10/28/2019 12:30 PM     Author: Lili Callaway EPS Service: -- Author Type: Exercise Phys Spec    Filed: 11/18/2019  3:44 PM Encounter Date: 10/28/2019 Status: Addendum    : Lili Callaway EPS (Exercise Phys Spec)    Related Notes: Original Note by Lili Callaway EPS (Exercise Phys Spec) filed at 10/28/2019  3:36 PM              Zestar Study Consent Visit    Study description: ECG and PPG Study: Zestar Study      Note time seated: 12:43pm    [place] Nava a 69 y.o. female , was seen in [facility] today to discuss participation in the Zestar study.   The consent discussion began on October 25, 2019.  Please refer to phone call note from Mirna ARIAS for more details.  The consent form was reviewed with the patient.     The review of the study included:    Study purpose     Conflict of interest    Device description      Study visits    Risks of participation    Benefits (if any)    Alternatives    Voluntary participation    Confidentiality     Compensation/costs of participation    Study stipends    Injury and legal rights    The subject was provided time to review the consent form and consider participation. her questions were answered to her satisfaction.   The patient has voluntarily agreed to participate in the above noted study.     The consent form version 25 Sept 2019 and HIPAA form version 11 June 2019 was signed 10/28/19 at 12:57pm    The subject was provided with a copy of the consent form and HIPPA. A copy of the signed forms was forwarded to medical records.    No study procedures were done prior to [place] Nava providing informed consent.     EMI Del Valle    Subject Restrictions During Study -Confirmed with Subject prior to any study procedures completed    Restrictions on jewelry, recreational drugs, caffeine, and exercise few days prior and during study.   1. Subjects should not consume excessive amount of caffeine (6 or more 8-oz cups of  "coffee, or more than 570 mg of caffeine from energy drinks, pills or similar substance) during their participation in the study.   2. Subjects should not consume excessive amount of alcohol for the duration of their participation in the study. A typical moderate amount is allowed during stage 3.   3. Subjects should not take any recreational drugs (including, but not limited to methamphetamines, cocaine, opioids, cannabis, LSD) for the duration of their participation in the study.   4. Subjects should not wear underwire bra or jewelry during the in-lab study (to not interfere with electrode placement and ECG data recordings).   5. Subject will not be permitted to have their cell phone or any electronic recording device on or with them during the in-lab test session(s).   6. Subjects under 22 years old will not be permitted to take ECG recordings through the ECG mukesh on the wrist-worn devices.     For study stage 3 only   1. Subjects should only do high intensity exercise (e.g. sprinting, heavy lifting, etc.) in the morning upon awakening or else not at all   2. Subjects should abstain from swimming during the time of the study   3. Subjects should only shower in the morning upon awakening (or else not at all)   4. Female subjects are strongly suggested to wear non-underwire bras throughout this stage of the study     EMI Del Valle      Study Data collections   Vitals  (TPBP)     Vitals:    10/28/19 1303 10/28/19 1305 10/28/19 1306   BP: 113/70 108/70 117/82   Patient Site: Left Arm Left Arm Left Arm   Patient Position: Sitting Sitting Sitting   Cuff Size: Adult Regular Adult Regular Adult Regular   Pulse: 60 (!) 59 61   Resp: 20     Temp:  97.3  F (36.3  C)    Weight:   140 lb 1.6 oz (63.5 kg)   Height:   5' 4\" (1.626 m)      VS taken after 5 min rest     MAP 1    94  MAP 2      77   MAP 3             89          Body mass index is 24.05 kg/m .  female  1950  69 y.o.      Note time patient placed in " supine position: 1:11pm    Ethnicity   []   or    [x]  Not  or   Race   []   or    []    []  Black or   []   or Other   [x]  White  Physical Activity Level  per subject report:   []  0- Extremely Inactive []  1- Sedentary []  2- Moderately Active  [x]  3- Vigorously Active []  4- Extremely Active  Trained Athlete   [] Yes  [x] No     Williamson's' Skin type   [] Type 1 [] Type 2 [x] Type 3    [] Type 4 [] Type 5 [] Type 6    Subject participated in previous ECG study at [facility]: [] Yes    [x] No    Past Medical History:   Diagnosis Date   ? Atrial fibrillation, persistent 05/2016    CV x2   ? Bunion of right foot 2014   ? Diverticulosis 1995   ? DVT of lower extremity (deep venous thrombosis) (H) 05/2017    rt leg from atrial fib   ? Essential tremor 1980   ? Hyperlipidemia 06/2010   ? Osteopenia 1998   ? PE (pulmonary thromboembolism) (H) 05/2017    dvt rt leg and PE rt lung   ? Skin cancer 06/2014   ? Varicose veins of both lower extremities with pain 1990       HISTORY OF HEART RHYTHM ABNORMALITIES - check all that apply  []  None   []  Atrial Flutter  [] Frequent PACs (>3 in 30 secs)  [] AF (permanent)  []  Tachycardia  [] Bigeminy, trigeminy, and/or quadgeminy  []  AF (persistent)  []  Heart Block   [x]  AF (paroxysmal)  [] PVCs    [] AF (other)    [] BBB    [x]  Other: AF with RVR  How many years? 3 Years  Special interest allergies: active allergic skin reactions  Allergies   Allergen Reactions   ? Amiodarone      Tremors much worse on amiodarone       Current Outpatient Medications:   ?  APIXABAN (ELIQUIS ORAL), Take 5 mg by mouth 2 (two) times a day. , Disp: , Rfl:   ?  calcium carbonate (CALCIUM 500 ORAL), Take 1,000 mg by mouth daily., Disp: , Rfl:   ?  clonazePAM (KLONOPIN) 0.5 MG tablet, Take 0.5 mg by mouth 2 (two) times a day as needed. , Disp: , Rfl:   ?  ergocalciferol (VITAMIN D2) 50,000  "unit capsule, Take 50,000 Units by mouth once a week., Disp: , Rfl:   ?  Lactobacillus rhamnosus GG (CULTURELLE) 10-15 Billion cell capsule, Take 1 capsule by mouth 2 (two) times a day with meals. , Disp: , Rfl:   ?  magnesium glycinate 100 mg Tab, Take 200 mg by mouth at bedtime. Leg cramps , Disp: , Rfl:   ?  melatonin 5 mg Tab tablet, Take 2.5 mg by mouth at bedtime as needed (sleep).    , Disp: , Rfl:   ?  metoprolol succinate (TOPROL-XL) 25 MG, TAKE ONE TABLET BY MOUTH EVERY DAY, Disp: 90 tablet, Rfl: 3  ?  multivitamin therapeutic (THERAGRAN) tablet, Take 1 tablet by mouth daily. , Disp: , Rfl:   ?  OMEGA-3/DHA/EPA/FISH OIL (FISH OIL-OMEGA-3 FATTY ACIDS) 300-1,000 mg capsule, Take 1 g by mouth daily. , Disp: , Rfl:   ?  TURMERIC ROOT EXTRACT ORAL, Take 1 capsule by mouth 2 (two) times a week., Disp: , Rfl:   No current facility-administered medications for this visit.     Facility-Administered Medications Ordered in Other Visits:   ?  sodium chloride 0.9%, , , Continuous PRN, Kieran, Kimberley PRAJAPATI, CRNA      10-sec 12-lead ECG & 30-sec 12-lead ECG rhythm strip done; reviewed by & PE done by Nessa BUSBY.  Subject Questionnaire    OCCUPATION: Retired   Predominately works outdoors  [] Yes    [x] No      Hours/week spent outdoors (total, not only for work): 35 hours    Frequently participates in \"hand intensive\" activities [] Yes [x] No  Caffeine  []  Never  [] Occasionally        []  Daily (1 cup/day)     [x]  Daily (>1 cup/day)    Alcohol   []  Never  [x] Light (drink or 2 occasionally) []  Moderate (a drink or 2 almost daily)   []  Occasional-heavy (more than a few drinks <2x / month)  [] Heavy (more than a few drinks >2x / month)    Tobacco/nicotine  [x]  Never  [] Rarely  []  Frequently/ Daily     Mattress Information  [] Subject did not participate in Stage 3  Mattress type:  []  Memory foam [] Gel  [x] Innerspring (coil)  [] Airbed  [] Waterbed [] Shikibuton  [] Hybrid  [] No mattress  [] Other " "(comment):    Mattress foundation   [] Mattress on floor/ground    [] Mattress on foundation/box spring on floor/ground  [x] Mattress on foundation/box spring on bed frame  [] Mattress on tatami on floor/ground  [] Other (comment):    Mattress topper   [] No mattress topper  [x] Pillow top  [] Foam top - flat style  [] Foam top - \"egg crate\" style  [] Other (comment):    Co-sleeper    []  Yes  [x]  No    CPAP use   [] Yes  [x] No      Dominant hand [] left  [x] right [] ambidextrous  Preferred Wrist to wear band on   [x]  left   []  right   Were screening day & study day: [x]  same [] different   Same: wrist circumference:      142   mm  Device wearing wrist skin fold thickness:       2.8  mm  Wrist Band Size:     [x]  Flush Fit S/M  []  Non-Flush Fit S/M   []  Flush Fit M/L  []  Non-Flush Fit M/L  []  Flush Fit XL  []  Non-Flush Fit XL    Preferred/natural band notch: 2  Secure band notch: 2  Crown orientation:  []  left   [x]  right  Device wearing wrist hairiness:     [x]  Light []  Medium       []  Heavy  Spectophotometer   L A B   Reading #1  57.55  8.70  22.56   Reading #2  60.68  9.20  22.45   Reading #3  60.97  8.45  21.51     Pregnancy test  for WOCBP    [x] n/a male or female not child bearing potential  Room Temperature ( C): 25C  CS Laptop ID: [id number]  CS Cam ID:[id number]  Device Set ID:[id number]  Wrist Device ID:[id number]  Subject has now completed their in-house participation in the Zestar study. Subject will complete Stage 3 at home for the next 3 days and return the equipment on Thursday October 31, 2019 [device return date].  EMI Del Valle         "

## 2021-06-28 NOTE — PROGRESS NOTES
Progress Notes by Yung Smyth MD at 10/31/2019 12:50 PM     Author: Yung Smyth MD Service: -- Author Type: Physician    Filed: 10/31/2019  1:13 PM Encounter Date: 10/31/2019 Status: Signed    : Yung Smyth MD (Physician)             [facility] Progress Note    Assessment:  1.  Atrial fibrillation with successful ablation January 2018.  No clinical recurrence.  She has been on Eliquis with a chads 2 vascular score of 2-3.  We have in the past and again today discussed alternative anticoagulation strategies including watchman device.  She is also on anticoagulation because of a history of DVT and pulmonary embolism.  Hematologic follow-up in May 2019 suggested that it was reasonable for her to remain on anticoagulation.  She is aware of the higher risk of bleeding including spontaneous bleeding.  She is aware to protect herself as a relates to falling.    2.  Elevated LDL.  This is been discussed at length in the past and again today.  She has been reluctant to consider statin therapy.  After discussion she would like to pursue calcium scoring which we will arrange and comment once available.    3.  Family history of abdominal aortic aneurysm.  She reports a few cousins with a history of ruptured aortic aneurysm.  We are going to plan screening abdominal ultrasound.      Plan: As outlined above with follow-up in 6 months.    1. Dyslipidemia     2. Family history of abdominal aortic aneurysm (AAA)  US Abdominal Aorta         An After Visit Summary was printed and given to the patient.    Subjective:    [place] Nava is a 69 y.o. female who returned for a planned  follow up visit.  She specifically denies chest pain, shortness of breath, palpitation, dizziness or lightheadedness.  She has been participating in the apple watch atrial fibrillation study.  She has not been aware of any atrial fibrillation although there was one watch alarm that suggested the  possibility.    She has a history of DVT and pulmonary embolism with associated atrial fibrillation May 2017 and has been on Eliquis.  She was seen by hematology May 2019 who suggested that it was reasonable for her to stay on anticoagulation as a relates to the history of DVT and pulmonary embolism.    He is status post ablation for atrial fibrillation in January 2018.  She has not had any significant complaints of palpitations, chest pain, dizziness or lightheadedness.    Review of Systems:   General: WNL  Eyes: WNL  Ears/Nose/Throat: WNL  Lungs: WNL  Heart: WNL  Stomach: WNL  Bladder: WNL  Muscle/Joints: WNL  Skin: WNL  Nervous System: WNL  Mental Health: WNL     Blood: WNL      Problem List:    Patient Active Problem List   Diagnosis   ? Varicose Veins   ? (Lower) Leg Localized Swelling Bilateral   ? Diverticulosis   ? Osteopenia   ? Hyperlipidemia   ? Familial (Benign Essential) Tremor   ? Essential hypertension   ? Bunion   ? Basal cell carcinoma of skin   ? Family history of colon cancer   ? Hallux rigidus   ? Hammer toe   ? Pulmonary embolism (H)   ? Persistent atrial fibrillation   ? Elevated LDL cholesterol level       Social History     Socioeconomic History   ? Marital status: Single     Spouse name: Not on file   ? Number of children: Not on file   ? Years of education: Not on file   ? Highest education level: Not on file   Occupational History   ? Occupation: retired     Comment:    Social Needs   ? Financial resource strain: Not on file   ? Food insecurity:     Worry: Not on file     Inability: Not on file   ? Transportation needs:     Medical: Not on file     Non-medical: Not on file   Tobacco Use   ? Smoking status: Never Smoker   ? Smokeless tobacco: Never Used   Substance and Sexual Activity   ? Alcohol use: Yes     Alcohol/week: 1.0 standard drinks     Types: 1 Glasses of wine per week     Comment: occasional   ? Drug use: No   ? Sexual activity: Not on file     Comment: single     Lifestyle   ? Physical activity:     Days per week: Not on file     Minutes per session: Not on file   ? Stress: Not on file   Relationships   ? Social connections:     Talks on phone: Not on file     Gets together: Not on file     Attends Hoahaoism service: Not on file     Active member of club or organization: Not on file     Attends meetings of clubs or organizations: Not on file     Relationship status: Not on file   ? Intimate partner violence:     Fear of current or ex partner: Not on file     Emotionally abused: Not on file     Physically abused: Not on file     Forced sexual activity: Not on file   Other Topics Concern   ? Not on file   Social History Narrative   ? Not on file       Family History   Problem Relation Age of Onset   ? Hypertension Mother    ? Colon cancer Sister    ? No Medical Problems Father    ? Atrial fibrillation Brother    ? Pacemaker Brother    ? No Medical Problems Sister    ? Prostate cancer Brother        Current Outpatient Medications   Medication Sig Dispense Refill   ? APIXABAN (ELIQUIS ORAL) Take 5 mg by mouth 2 (two) times a day.      ? calcium carbonate (CALCIUM 500 ORAL) Take 1,000 mg by mouth daily.     ? clonazePAM (KLONOPIN) 0.5 MG tablet Take 0.5 mg by mouth 2 (two) times a day as needed.      ? ergocalciferol (VITAMIN D2) 50,000 unit capsule Take 50,000 Units by mouth once a week.     ? Lactobacillus rhamnosus GG (CULTURELLE) 10-15 Billion cell capsule Take 1 capsule by mouth 2 (two) times a day with meals.      ? magnesium glycinate 100 mg Tab Take 200 mg by mouth at bedtime. Leg cramps      ? melatonin 5 mg Tab tablet Take 2.5 mg by mouth at bedtime as needed (sleep).            ? metoprolol succinate (TOPROL-XL) 25 MG TAKE ONE TABLET BY MOUTH EVERY DAY 90 tablet 3   ? multivitamin therapeutic (THERAGRAN) tablet Take 1 tablet by mouth daily.      ? OMEGA-3/DHA/EPA/FISH OIL (FISH OIL-OMEGA-3 FATTY ACIDS) 300-1,000 mg capsule Take 1 g by mouth daily.      ? TURMERIC  "ROOT EXTRACT ORAL Take 1 capsule by mouth 2 (two) times a week.       No current facility-administered medications for this visit.      Facility-Administered Medications Ordered in Other Visits   Medication Dose Route Frequency Provider Last Rate Last Dose   ? sodium chloride 0.9%    Continuous PRN Kimberley Alcaraz CRNA           Objective:     /72 (Patient Site: Right Arm, Patient Position: Sitting, Cuff Size: Adult Regular)   Pulse 60   Resp 16   Ht 5' 4\" (1.626 m)   Wt 139 lb (63 kg)   BMI 23.86 kg/m    139 lb (63 kg)   [unfilled]  Wt Readings from Last 3 Encounters:   10/31/19 139 lb (63 kg)   10/28/19 140 lb 1.6 oz (63.5 kg)   19 135 lb (61.2 kg)       Physical Exam:    GENERAL APPEARANCE: alert, no apparent distress  HEENT: no scleral icterus or xanthelasma  NECK: jugular venous pressure within normal limits  CHEST: symmetric, the lungs are clear to auscultation  CARDIOVASCULAR: regular rhythm without murmur, click, or gallop; no carotid bruits  Abdomen: No Organomegaly, masses, bruits, or tenderness. Bowels sounds are present      EXTREMITIES: no cyanosis, clubbing or edema    Cardiac Testing:  Reviewed By     Yung Smyth MD on 2018 18:50   Echo Complete   Order# [id number]   Reading physician: Bear Wheeler MD Ordering physician: Yung Smyth MD Study date: 18   Performing Date Performing Department   Aug 9, 2018  CARDIAC TESTING [[id number]]   Patient Information     Patient Name  [place] Nava MRN  [id number] Sex  Female  [birth year]  1950 (68 y.o.)   Indications     Dx: Paroxysmal atrial fibrillation (H) [I48.0 (ICD-10-CM)]   Summary       When compared to the previous study dated 2017, no significant change.    Left ventricle ejection fraction is normal. The calculated left ventricular ejection fraction is 61%.    Moderate left atrial enlargement    Normal right ventricular size and systolic function.    No hemodynamically significant valvular heart " abnormalities.          MUSE DIAGNOSIS   ECG Clinic - Today   Collected: 10/28/19 1325   Resulting lab: HE MUSE   Value: Normal sinus rhythm   Rightward axis   Borderline ECG   When compared with ECG of 12-SEP-2018 11:37,   No significant change was found   Confirmed by JOEY DE DIOS MD LOC:JN ([id number]) on 10/28/2019 4:08:08 PM          Lab Results:    Lab Results   Component Value Date     04/23/2019    K 4.1 04/23/2019     04/23/2019    CO2 29 04/23/2019    BUN 13 04/23/2019    CREATININE 0.78 04/23/2019    CALCIUM 9.6 04/23/2019     Lab Results   Component Value Date    CHOL 250 (H) 04/23/2019    TRIG 68 04/23/2019    HDL 64 04/23/2019     BNP (pg/mL)   Date Value   05/26/2017 662 (H)     Creatinine (mg/dL)   Date Value   04/23/2019 0.78   01/03/2018 0.95   10/30/2017 1.07   07/14/2017 1.28 (H)     LDL Calculated (mg/dL)   Date Value   04/23/2019 172 (H)   06/16/2017 134 (H)   03/17/2014 174 (H)     Lab Results   Component Value Date    WBC 5.7 01/03/2018    HGB 15.5 01/03/2018    HCT 46.5 01/03/2018    MCV 95 01/03/2018     01/03/2018         This note has been dictated using voice recognition software. Any grammatical or context distortions are unintentional and inherent to the software.

## 2021-06-28 NOTE — PROGRESS NOTES
Progress Notes by Lili Callaway EPS at 10/28/2019 12:30 PM     Author: Lili Callaway EPS Service: -- Author Type: Exercise Phys Spec    Filed: 10/28/2019  3:36 PM Encounter Date: 10/28/2019 Status: Cosign Needed    : Lili Callaway EPS (Exercise Phys Spec) Cosign Required: Yes           [facility] Study Inclusion / Exclusion Criteria  Protocol Version 2.0 ([id number])    Inclusion Criteria    Yes No Criteria # Subject must meet all inclusion criteria:      [x]    []  1 At least 18 years old for NSR and 22 years old for Non-NSR. Inclusive for both cohorts, at time of screening, with no upper limit on age.        [x]      []  2 To be enrolled as a non-NSR subject the volunteer must have one of the following conditions: Permanent/Persistent AF, Hx of paroxysmal AF, Hx of High-rate AF, AF + rate control medication, Hx Atrial Flutter, PVC burden >1%, Frequent PACs, Hx BBB, Hx 2nd degree block (any type), Hx Bigeminy/Trigeminy/Quadgeminy, Hx Tachycardia. For subjects with any of the following diagnoses, the condition must be present at the time of screening:   ? Permanent/persistent AF  ? PVC Kamrar  ? Frequent PACs   Note: If not present at screening, subjects may not be enrolled as a non-NSR subject or NSR subject.         3  [x] N/A HE site For Subjects to be enrolled as NSR they must be in NSR at the time of screening as determined by the investigator. For subjects with occasional ectopic beats (<1% burden during screening), they should still qualify as individuals in the NSR cohort as long as they don't have a medical history/diagnosis of significant ectopic burden.    [x]  []  4 Able to read and understand a written ICF    [x]  []  5 Willing and able to participate in the study procedures and comply with its restrictions    [x]  []  6 Able to communicate effectively with study staff as well as understand and follow directions    All must be Yes    Exclusion Criteria-all must be no  Yes No Criteria #  Subject must not meet any exclusion criteria:    []  [x]  1 Physical disability that prevents safe and adequate testing.   []  [x]  2 Pregnant women or women planning to become pregnant   []  [x]  3 Any acute illness or condition that may interfere with study procedures (e.g. cough, fever, sore throat, headache, sunburn, etc.)   []  [x]  4 Clinically significant hand tremors, as judged by the Investigator   []  [x]  5 Resting hypertension with systolic blood pressure ?161 mmHg or diastolic blood pressure ?101 mmHg (if at least 2 of 3 measurements meet this criteria)   []  [x]  6 Subjects with implanted cardiac devices such as a pacemaker or an automated implantable cardioverter- defibrillator (AICD)   []  [x]  7 Acute myocardial infarction (MI) within 90 days from the screening visit   []  [x]  8 Other cardiovascular disease that increases the risk to the subject or would render the data uninterpretable in the opinion of the Investigator (e.g., recent or ongoing unstable angina, significant valvular heart disease or chronic heart failure, myocarditis or pericarditis)    []  [x]  9 Acute pulmonary embolism, pulmonary infarction, or deep vein thrombosis within 90 days from the screening visit    []  [x]  10 Stroke or transient ischemic attack within 90 days from the screening visit    []  []  11 Known untreated medical conditions as determined by the Investigator, such as but not limited to significant anemia, important electrolyte imbalance and untreated or uncontrolled thyroid disease.    []  [x]  12 Any history of wrist surgery with scarring in the area of the sensor location on the wrist where the subject will be wearing the watch;    []  [x]  13 Open wound(s) on the wrist and forearm where the subject will be wearing the watch    []  [x]  14 Severe symptomatic (or active) overly dry/injured skin, skin disorders, or allergic skin reactions such as eczema, rosacea, impetigo, dermatomyositis or allergic contact  dermatitis on wrist and locations where the electrodes will be placed (e.g. chest, forearms, stomach), as determined by the investigator.    []  [x]  15 Tattoos, scars or moles in the area of the sensor location on the wrist where the subject will be wearing the watch    []  [x]  16 Device wearing Wrist circumference ? 129 mm or ? 246 mm    []  [x]  17 Known significant sensitivity to medical adhesives or isopropyl alcohol (for ECG electrode placement)    []  [x]  18 Known allergy or sensitivity to fluorocarbon-based synthetic rubber, such as contact dermatitis with fluoroelastomer bands primarily used in wrist worn fitness devices    []  [x]  19 Subjects with any Medical History, Physical exam, vital sign or any other study procedure finding/assessment that in the opinion of the investigator could compromise subject safety during study participation or interfere with the study integrity and/or the accurate assessment of the study objectives    []  [x]  20 Subject works for a company that develops or sells medical and/or fitness devices (e.g., ECG monitors, wearable fitness bands, sleep monitors, etc.) or are technology journalists (e.g., professional bloggers, TV, magazine, newspaper reporters, etc.)    []  [x]  21 Weight > 181 kg for subjects using the stationary bike and/or treadmill. Weight of ?138 kg for NSR subjects.    []  [x]  22 Subject is employed in shift work, or otherwise does not maintain a reasonably consistent day/night schedule (e.g. Subjects who go to bed after 4am).    []  [x]  23 Overnight travel planned during data collection nights    []  [x]  24 Non-NSR subjects should not have partaken in strenuous physical activity within 12 hours prior to screening    []  [x]  25 Non-NSR subjects with Atrial fibrillation categories: Subjects taking Class 1 or Class 3 antiarrhythmic agents such as the following may not take part in any stage of the study: amiodarone, sotalol, dronedarone, ibutilide,  dofetilide, propafenone, quinidine, procainamide, disopyramide, flecainide (Subjects taking class 2, 4 or 5 antiarrhythmic agents may take part the study).    []  [x]  26 Subjects who have both a history of paroxysmal AF and a Williamson skin type measurement of VI    []  [x]  27 Subjects who have missing index fingers on both hands        Lili Callaway, EPS

## 2021-06-29 NOTE — PROGRESS NOTES
Progress Notes by Yung Smyth MD at 7/29/2020  3:10 PM     Author: Yung Smyth MD Service: -- Author Type: Physician    Filed: 7/29/2020  3:38 PM Encounter Date: 7/29/2020 Status: Signed    : Yung Smyht MD (Physician)             [facility] Progress Note    Assessment:  1.  Paroxysmal atrial fibrillation with ablation January 2018.  She is on Eliquis with a chads vascular score of 2.  She has a history of DVT/PE and was seen by hematology the note from which I reviewed who recommended that it would be reasonable for her to stay on anticoagulation.  Risk reoccurrence is 10 to 20%.  Off anticoagulation.  She is aware of the potential risk of bleeding and to avoid nonsteroidal anti-inflammatory medications and we talked about avoiding alcohol.    2.  Dyslipidemia.  She continues to have an elevated total cholesterol and LDL with a coronary calcium score of 0 December 2019.  Her preference is to continue with dietary and exercise means to control her cholesterol numbers.      Plan: Follow-up 6 months.  She update us with recurrence or increase in palpitations.    1. Paroxysmal atrial fibrillation (H)  ECG Clinic - Today         An After Visit Summary was printed and given to the patient.    Subjective:    [place] Nava is a 70 y.o. female who returned for a planned  follow up visit.  She reports that she been feeling well.  She states a number of months ago she did have some episodic regular heart removal and did feel mildly short of breath with exertion.  This has since resolved.  He participated in the [employer] study and she is aware that she did have some atrial fibrillation although I do not have the official report from this study and will try to request.  More recently she has had no awareness of palpitations, chest pain, dizziness, shortness of breath.  She does have elevated cholesterol with a calcium score that was evaluated December 2019 with a score of 0.  She  has been frustrated by her ongoing dissipation in diet and exercise and the current numbers.  He would like to continue with prudent diet and exercise rather than consider additional medication at this time.  Review of Systems:   General: WNL  Eyes: WNL  Ears/Nose/Throat: WNL  Lungs: WNL  Heart: WNL  Stomach: WNL  Bladder: WNL  Muscle/Joints: WNL  Skin: WNL  Nervous System: WNL  Mental Health: WNL     Blood: WNL      Problem List:    Patient Active Problem List   Diagnosis   ? Varicose Veins   ? (Lower) Leg Localized Swelling Bilateral   ? Diverticulosis   ? Osteopenia   ? Hyperlipidemia   ? Familial (Benign Essential) Tremor   ? Essential hypertension   ? Bunion   ? Basal cell carcinoma of skin   ? Family history of colon cancer   ? Hallux rigidus   ? Hammer toe   ? Pulmonary embolism (H)   ? Persistent atrial fibrillation (H)   ? Elevated LDL cholesterol level       Social History     Socioeconomic History   ? Marital status: Single     Spouse name: Not on file   ? Number of children: Not on file   ? Years of education: Not on file   ? Highest education level: Not on file   Occupational History   ? Occupation: retired     Comment:    Social Needs   ? Financial resource strain: Not on file   ? Food insecurity     Worry: Not on file     Inability: Not on file   ? Transportation needs     Medical: Not on file     Non-medical: Not on file   Tobacco Use   ? Smoking status: Never Smoker   ? Smokeless tobacco: Never Used   Substance and Sexual Activity   ? Alcohol use: Yes     Alcohol/week: 1.0 standard drinks     Types: 1 Glasses of wine per week     Comment: occasional   ? Drug use: No   ? Sexual activity: Not on file     Comment: single    Lifestyle   ? Physical activity     Days per week: Not on file     Minutes per session: Not on file   ? Stress: Not on file   Relationships   ? Social connections     Talks on phone: Not on file     Gets together: Not on file     Attends Worship service: Not on file      Active member of club or organization: Not on file     Attends meetings of clubs or organizations: Not on file     Relationship status: Not on file   ? Intimate partner violence     Fear of current or ex partner: Not on file     Emotionally abused: Not on file     Physically abused: Not on file     Forced sexual activity: Not on file   Other Topics Concern   ? Not on file   Social History Narrative   ? Not on file       Family History   Problem Relation Age of Onset   ? Hypertension Mother    ? Colon cancer Sister    ? No Medical Problems Father    ? Atrial fibrillation Brother    ? Pacemaker Brother    ? No Medical Problems Sister    ? Prostate cancer Brother        Current Outpatient Medications   Medication Sig Dispense Refill   ? APIXABAN (ELIQUIS ORAL) Take 5 mg by mouth 2 (two) times a day.      ? calcium carbonate (CALCIUM 500 ORAL) Take 1,000 mg by mouth daily.     ? clonazePAM (KLONOPIN) 0.5 MG tablet Take 0.5 mg by mouth 2 (two) times a day as needed.      ? ergocalciferol (VITAMIN D2) 50,000 unit capsule Take 50,000 Units by mouth once a week.     ? Lactobacillus rhamnosus GG (CULTURELLE) 10-15 Billion cell capsule Take 1 capsule by mouth 2 (two) times a day with meals.      ? magnesium glycinate 100 mg Tab Take 200 mg by mouth at bedtime. Leg cramps      ? melatonin 5 mg Tab tablet Take 2.5 mg by mouth at bedtime as needed (sleep).            ? metoprolol succinate (TOPROL-XL) 25 MG TAKE ONE TABLET BY MOUTH EVERY DAY 90 tablet 3   ? multivitamin therapeutic (THERAGRAN) tablet Take 1 tablet by mouth daily.      ? OMEGA-3/DHA/EPA/FISH OIL (FISH OIL-OMEGA-3 FATTY ACIDS) 300-1,000 mg capsule Take 1 g by mouth daily.      ? TURMERIC ROOT EXTRACT ORAL Take 1 capsule by mouth 2 (two) times a week.       No current facility-administered medications for this visit.      Facility-Administered Medications Ordered in Other Visits   Medication Dose Route Frequency Provider Last Rate Last Dose   ? sodium chloride  0.9%    Continuous PRN Kibmerley Alcaraz CRNA           Objective:     /66 (Patient Site: Left Arm, Patient Position: Sitting, Cuff Size: Adult Regular)   Pulse 84   Wt 142 lb (64.4 kg)   SpO2 97%   BMI 24.37 kg/m    142 lb (64.4 kg)   [unfilled]  Wt Readings from Last 3 Encounters:   20 142 lb (64.4 kg)   10/31/19 139 lb (63 kg)   10/28/19 140 lb 1.6 oz (63.5 kg)       Physical Exam:    GENERAL APPEARANCE: alert, no apparent distress  HEENT: no scleral icterus or xanthelasma  NECK: jugular venous pressure within normal limits  CHEST: symmetric, the lungs are clear to auscultation  CARDIOVASCULAR: regular rhythm without murmur, click, or gallop; no carotid bruits  Abdomen: No Organomegaly, masses, bruits, or tenderness. Bowels sounds are present      EXTREMITIES: no cyanosis, clubbing or edema    Cardi  Reading physician: Bear Wheeler MD  Ordering physician: Yung Smyth MD  Study date: 18    Performing Date  Performing Department    Aug 9, 2018   CARDIAC TESTING [[id number]]    Patient Information     Patient Name   [place] Nava  MRN   [id number]  Sex   Female   [birth year]   1950 (68 y.o.)    Indications     Dx: Paroxysmal atrial fibrillation (H) [I48.0 (ICD-10-CM)]    Summary       When compared to the previous study dated 2017, no significant change.    Left ventricle ejection fraction is normal. The calculated left ventricular ejection fraction is 61%.    Moderate left atrial enlargement    Normal right ventricular size and systolic function.    No hemodynamically significant valvular heart abnormalities.        ac Testing:  ECG today sinus rhythm with short WI interval and premature atrial complexes otherwise normal.      Lab Results:    Lab Results   Component Value Date     2020    K 4.1 2020     2020    CO2 27 2020    BUN 16 2020    CREATININE 0.84 2020    CALCIUM 9.5 2020     Lab Results   Component Value  Date    CHOL 257 (H) 04/29/2020    TRIG 93 04/29/2020    HDL 71 04/29/2020     BNP (pg/mL)   Date Value   05/26/2017 662 (H)     Creatinine (mg/dL)   Date Value   04/29/2020 0.84   04/23/2019 0.78   01/03/2018 0.95   10/30/2017 1.07     LDL Calculated (mg/dL)   Date Value   04/29/2020 167 (H)   04/23/2019 172 (H)   06/16/2017 134 (H)     Lab Results   Component Value Date    WBC 5.7 01/03/2018    HGB 15.5 01/03/2018    HCT 46.5 01/03/2018    MCV 95 01/03/2018     01/03/2018         This note has been dictated using voice recognition software. Any grammatical or context distortions are unintentional and inherent to the software.

## 2021-06-30 NOTE — PROGRESS NOTES
Progress Notes by Yung Smyth MD at 3/1/2021  1:10 PM     Author: Yung Smyth MD Service: -- Author Type: Physician    Filed: 3/1/2021  1:41 PM Encounter Date: 3/1/2021 Status: Signed    : Yung Smyth MD (Physician)             [facility] [place] [facility] Progress Note    Assessment:  1.  Paroxysmal atrial fibrillation status post ablation January 2018.  She is on Eliquis with a chads vascular score of 3.  She has a history of DVT/PE and has been seen by hematology in the past who recommended that anticoagulation be continued for an elevated risk recurrence.  She again is noted is aware of the risk of bleeding and to avoid nonsteroidal anti-inflammatory medications.  She is asked to monitor her pulse.    2.  Dyslipidemia.  Coronary calcium score of 0 December 2019.  She is encouraged to follow-up with her primary care physician.  She does have an elevated total cholesterol and LDL but because of her preference and coronary calcium score being 0 she has not been on statin.      Plan:  Medications are renewed and follow-up in 6 months.  1. Persistent atrial fibrillation (H)  metoprolol succinate (TOPROL-XL) 25 MG    apixaban ANTICOAGULANT (ELIQUIS) 5 mg Tab tablet         An After Visit Summary was printed and given to the patient.    Subjective:    [place] Nava is a 70 y.o. female who returned for a planned  follow up visit.  She reports to me today that she has been feeling very well.  She has no complaints of chest pain or exercise intolerance or shortness of breath.  She has not been aware of any irregular heart rhythm although notes that occasionally her blood pressure she will noted irregular heart rhythm.  She has a history of paroxysmal atrial fibrillation and underwent successful ablation January 2018.  She has been maintained on Eliquis with a chads vascular score of 3.    She does have a history of hyperlipidemia and a family history of hyperlipidemia.  She has a prior  coronary calcium score within the past few years that has been 0 and has been reluctant to consider statins.  She has previously been seen by hematology most recently in 2019 and it was suggested that she be maintained on anticoagulation because of her history of DVT and PE.  Therefore there would be no clear benefit of the watchman device other than lower dose of Eliquis.  She is not experiencing any significant bleeding issues and knows to avoid nonsteroidal anti-inflammatory medications and is aware of the risk of bleeding.    Review of Systems:   General: WNL  Eyes: WNL  Ears/Nose/Throat: WNL  Lungs: WNL  Heart: WNL  Stomach: WNL  Bladder: WNL  Muscle/Joints: WNL  Skin: WNL  Nervous System: WNL  Mental Health: WNL     Blood: WNL      Problem List:    Patient Active Problem List   Diagnosis   ? Varicose Veins   ? (Lower) Leg Localized Swelling Bilateral   ? Diverticulosis   ? Osteopenia   ? Hyperlipidemia   ? Familial (Benign Essential) Tremor   ? Essential hypertension   ? Bunion   ? Basal cell carcinoma of skin   ? Family history of colon cancer   ? Hallux rigidus   ? Hammer toe   ? Pulmonary embolism (H)   ? Persistent atrial fibrillation (H)   ? Elevated LDL cholesterol level       Social History     Socioeconomic History   ? Marital status: Single     Spouse name: Not on file   ? Number of children: Not on file   ? Years of education: Not on file   ? Highest education level: Not on file   Occupational History   ? Occupation: retired     Comment:    Social Needs   ? Financial resource strain: Not on file   ? Food insecurity     Worry: Not on file     Inability: Not on file   ? Transportation needs     Medical: Not on file     Non-medical: Not on file   Tobacco Use   ? Smoking status: Never Smoker   ? Smokeless tobacco: Never Used   Substance and Sexual Activity   ? Alcohol use: Yes     Alcohol/week: 1.0 standard drinks     Types: 1 Glasses of wine per week     Comment: occasional   ? Drug use:  No   ? Sexual activity: Not on file     Comment: single    Lifestyle   ? Physical activity     Days per week: Not on file     Minutes per session: Not on file   ? Stress: Not on file   Relationships   ? Social connections     Talks on phone: Not on file     Gets together: Not on file     Attends Anabaptist service: Not on file     Active member of club or organization: Not on file     Attends meetings of clubs or organizations: Not on file     Relationship status: Not on file   ? Intimate partner violence     Fear of current or ex partner: Not on file     Emotionally abused: Not on file     Physically abused: Not on file     Forced sexual activity: Not on file   Other Topics Concern   ? Not on file   Social History Narrative   ? Not on file       Family History   Problem Relation Age of Onset   ? Hypertension Mother    ? Colon cancer Sister    ? No Medical Problems Father    ? Atrial fibrillation Brother    ? Pacemaker Brother    ? No Medical Problems Sister    ? Prostate cancer Brother        Current Outpatient Medications   Medication Sig Dispense Refill   ? clonazePAM (KLONOPIN) 0.5 MG tablet Take 0.5 mg by mouth 2 (two) times a day as needed.      ? ergocalciferol (VITAMIN D2) 50,000 unit capsule Take 50,000 Units by mouth once a week.     ? Lactobacillus rhamnosus GG (CULTURELLE) 10-15 Billion cell capsule Take 1 capsule by mouth 2 (two) times a day with meals.      ? magnesium glycinate 100 mg Tab Take 200 mg by mouth at bedtime. Leg cramps      ? melatonin 5 mg Tab tablet Take 2.5 mg by mouth at bedtime as needed (sleep).            ? metoprolol succinate (TOPROL-XL) 25 MG Take 1 tablet (25 mg total) by mouth daily. 90 tablet 5   ? multivitamin therapeutic (THERAGRAN) tablet Take 1 tablet by mouth daily.      ? OMEGA-3/DHA/EPA/FISH OIL (FISH OIL-OMEGA-3 FATTY ACIDS) 300-1,000 mg capsule Take 1 g by mouth daily.      ? TURMERIC ROOT EXTRACT ORAL Take 1 capsule by mouth 2 (two) times a week.     ? apixaban  "ANTICOAGULANT (ELIQUIS) 5 mg Tab tablet Take 1 tablet (5 mg total) by mouth 2 (two) times a day. 180 tablet 3   ? calcium carbonate (CALCIUM 500 ORAL) Take 1,000 mg by mouth daily.       No current facility-administered medications for this visit.      Facility-Administered Medications Ordered in Other Visits   Medication Dose Route Frequency Provider Last Rate Last Admin   ? sodium chloride 0.9%    Continuous PRN Kimberley Alcaraz CRNA   New Bag at 17 1245       Objective:     /80 (Patient Site: Right Arm, Patient Position: Sitting, Cuff Size: Adult Regular)   Pulse 76   Resp 16   Ht 5' 4\" (1.626 m)   Wt 136 lb (61.7 kg)   BMI 23.34 kg/m    136 lb (61.7 kg)   [unfilled]  Wt Readings from Last 3 Encounters:   21 136 lb (61.7 kg)   20 142 lb (64.4 kg)   10/31/19 139 lb (63 kg)       Physical Exam:    GENERAL APPEARANCE: alert, no apparent distress  HEENT: no scleral icterus or xanthelasma  NECK: jugular venous pressure within normal limits  CHEST: symmetric, the lungs are clear to auscultation  CARDIOVASCULAR: regular rhythm without murmur, click, or gallop; no carotid bruits  Abdomen: No Organomegaly, masses, bruits, or tenderness. Bowels sounds are present      EXTREMITIES: no cyanosis, clubbing or edema    Cardiac Testing:  Echo Complete  Order# [id number]  Reading physician: Bear Wheeler MD Ordering physician: Yung Smyth MD Study date: 18   Performing Date Performing Department   Aug 9, 2018  CARDIAC TESTING [[id number]]   Patient Information    Patient Name   [place] Nava MRN   [id number] Sex   Female  1   1950 (68 y.o.)   Indications    Dx: Paroxysmal atrial fibrillation (H) [I48.0 (ICD-10-CM)]   Summary      When compared to the previous study dated 2017, no significant change.    Left ventricle ejection fraction is normal. The calculated left ventricular ejection fraction is 61%.    Moderate left atrial enlargement    Normal right " ventricular size and systolic function.    No hemodynamically significant valvular heart abnormalities.        Holter Monitor  Order# [id number]  Reading physician: Jeff Gonzalez MD Ordering physician: Yugn Smyth MD Study date: 17   Patient Information    Patient Name   [place] Nava MRN   [id number] Sex   Female  [birth year]   1950 (67 y.o.)   Order-Level Documents:    Scan on 2017 1:04 PM  Scan on 2017 9:47 AM by Denise Morales: Diary         Indications    Atrial fibrillation (H)   Conclusion    [facility]     HOLTER REPORT     Results:    Indication for study: Atrial fibrillation    The predominant rhythm throughout the tracing was normal sinus rhythm however the patient also demonstrated significant periods of sustained atrial fibrillation.  Ventricular response in atrial fibrillation tended to be elevated with mean hourly heart rates between 120-130 bpm.  Heart rate response and sinus rhythm was normal. The MA interval in sinus rhythm was normal.  The QRS duration was normal during sinus rhythm and at times during atrial fibrillation with rapid ventricular response the patient demonstrated apparent conduction..  The QT interval was normal.  The study demonstrated no significant bradycardia/pauses.    The patient demonstrated relatively rare atrial ectopy.  Nonsustained ectopic atrial tachycardia was was observed on multiple occasions with short runs of nonsustained ectopic atrial tachycardia..  Sustained ectopic atrial tachycardia, atrial fibrillation, or other supraventricular tachycardia was observed with PAC induced atrial fibrillation.  The total atrial fibrillation burden was 10 hours and 3 minutes.  Ventricular response was elevated during atrial fibrillation as noted above.    The patient demonstrated rare ventricular ectopy.  Complex ventricular ectopy was not observed, the automated record he reports a run of nonsustained ventricular tachycardia 11 beats.  This was in  "fact rapid ectopic atrial tachycardia which had apparent conduction. Sustained ventricular tachycardia was not observed.    The patient did return a diary.  Patient reported no cardiac symptoms.     Impression:    Abnormal Holter monitor tracing by virtue of the presence of sustained atrial fibrillation accounting for almost half of the 24 hour recording.    Ventricular response during atrial fibrillation is elevated    The patient did not report symptoms therefore the atrial fibrillation would appear to be \"silent\".    No sustained ventricular tachyarrhythmia.    No profound bradycardia or pauses        Comment: The recording was for 23 hours and 59 minute.  The recording quality was adequate       29-JUL-2020 15:34:16 M [address] ROUTINE RETRIEVAL  Sinus rhythm with short KY with Premature supraventricular complexes Blocked Premature atrial complexes  Otherwise normal ECG  When compared with ECG of 28-OCT-2019 13:25,  Premature supraventricular complexes are now Present  Blocked Premature atrial complexes or sinus pause new  Confirmed by CHELY NUNEZ, JUAN LOC:YARELI ([id number]) on 7/30/2020 2:56:26 PM  25mm/s 10mm/mV 150Hz 9.0.10 12SL 239 CAROLYN: 1  Referred by: ABEL SIMS Confirmed By: LES LOC:YARELI MO MD    Lab Results:    Lab Results   Component Value Date     04/29/2020    K 4.1 04/29/2020     04/29/2020    CO2 27 04/29/2020    BUN 16 04/29/2020    CREATININE 0.84 04/29/2020    CALCIUM 9.5 04/29/2020     Lab Results   Component Value Date    CHOL 257 (H) 04/29/2020    TRIG 93 04/29/2020    HDL 71 04/29/2020     BNP (pg/mL)   Date Value   05/26/2017 662 (H)     Creatinine (mg/dL)   Date Value   04/29/2020 0.84   04/23/2019 0.78   01/03/2018 0.95   10/30/2017 1.07     LDL Calculated (mg/dL)   Date Value   04/29/2020 167 (H)   04/23/2019 172 (H)   06/16/2017 134 (H)     Lab Results   Component Value Date    WBC 5.7 01/03/2018    HGB 15.5 01/03/2018    HCT 46.5 01/03/2018    MCV 95 01/03/2018    "  01/03/2018         This note has been dictated using voice recognition software. Any grammatical or context distortions are unintentional and inherent to the software.

## 2021-07-06 VITALS
RESPIRATION RATE: 16 BRPM | SYSTOLIC BLOOD PRESSURE: 112 MMHG | DIASTOLIC BLOOD PRESSURE: 72 MMHG | HEART RATE: 66 BPM | OXYGEN SATURATION: 97 %

## 2021-07-13 ENCOUNTER — RECORDS - HEALTHEAST (OUTPATIENT)
Dept: ADMINISTRATIVE | Facility: CLINIC | Age: 71
End: 2021-07-13

## 2021-07-21 ENCOUNTER — RECORDS - HEALTHEAST (OUTPATIENT)
Dept: ADMINISTRATIVE | Facility: CLINIC | Age: 71
End: 2021-07-21

## 2021-07-22 ENCOUNTER — RECORDS - HEALTHEAST (OUTPATIENT)
Dept: FAMILY MEDICINE | Facility: CLINIC | Age: 71
End: 2021-07-22

## 2021-07-22 DIAGNOSIS — Z12.31 OTHER SCREENING MAMMOGRAM: ICD-10-CM

## 2021-08-26 ENCOUNTER — LAB REQUISITION (OUTPATIENT)
Dept: LAB | Facility: CLINIC | Age: 71
End: 2021-08-26

## 2021-08-26 DIAGNOSIS — E78.00 PURE HYPERCHOLESTEROLEMIA, UNSPECIFIED: ICD-10-CM

## 2021-08-26 LAB
ANION GAP SERPL CALCULATED.3IONS-SCNC: 12 MMOL/L (ref 5–18)
BUN SERPL-MCNC: 17 MG/DL (ref 8–28)
CALCIUM SERPL-MCNC: 9.4 MG/DL (ref 8.5–10.5)
CHLORIDE BLD-SCNC: 105 MMOL/L (ref 98–107)
CHOLEST SERPL-MCNC: 231 MG/DL
CO2 SERPL-SCNC: 24 MMOL/L (ref 22–31)
CREAT SERPL-MCNC: 0.83 MG/DL (ref 0.6–1.1)
GFR SERPL CREATININE-BSD FRML MDRD: 70 ML/MIN/1.73M2
GLUCOSE BLD-MCNC: 86 MG/DL (ref 70–125)
HDLC SERPL-MCNC: 67 MG/DL
LDLC SERPL CALC-MCNC: 150 MG/DL
POTASSIUM BLD-SCNC: 3.9 MMOL/L (ref 3.5–5)
SODIUM SERPL-SCNC: 141 MMOL/L (ref 136–145)
TRIGL SERPL-MCNC: 69 MG/DL

## 2021-08-26 PROCEDURE — 80061 LIPID PANEL: CPT | Performed by: FAMILY MEDICINE

## 2021-08-26 PROCEDURE — 80048 BASIC METABOLIC PNL TOTAL CA: CPT | Performed by: FAMILY MEDICINE

## 2021-09-20 ENCOUNTER — TELEPHONE (OUTPATIENT)
Dept: CARDIOLOGY | Facility: CLINIC | Age: 71
End: 2021-09-20

## 2021-09-20 NOTE — TELEPHONE ENCOUNTER
----- Message from Luigi PRAJAPATI Herron sent at 9/20/2021 10:26 AM CDT -----  Regarding: AAMIR KLEIN  General phone call:    Caller: RYAN  Primary cardiologist: aamir  Detailed reason for call: 3 day hold of medication Eliquis and if any bridging is needed.    Best phone number: [phone]  Best time to contact: any  Ok to leave a detailedmessage? yes  Device? no    Additional Info:

## 2021-09-20 NOTE — TELEPHONE ENCOUNTER
Pt on Eliquis for paroxysmal a-fib.      [facility] requesting 3 day hold and if any bridging needed prior to procedure.    Dr Smyth - what are your recommendations?  -douglas

## 2021-09-20 NOTE — TELEPHONE ENCOUNTER
From an A. fib standpoint okay to discontinue with small increased risk of stroke and renew as soon as possible.  As a relates to history of DVT PE will need to defer that question to her primary care provider.    Yung Smyth MD

## 2021-10-10 ENCOUNTER — HEALTH MAINTENANCE LETTER (OUTPATIENT)
Age: 71
End: 2021-10-10

## 2021-11-22 ENCOUNTER — TELEPHONE (OUTPATIENT)
Dept: CARDIOLOGY | Facility: CLINIC | Age: 71
End: 2021-11-22
Payer: COMMERCIAL

## 2021-11-22 NOTE — TELEPHONE ENCOUNTER
----- Message from Marissa Mendez sent at 11/22/2021 11:08 AM CST -----  Regarding: [facility] PT  General phone call:    Caller: [place]   Primary cardiologist: [facility]  Detailed reason for call: She has a colonsocpy 11/23 with  RYAN and she just saw to check if she should take her Eliquis?   Best phone number: [phone]  Best time to contact: any  Ok to leave a detailedmessage? yes  Device? no    Additional Info:

## 2022-01-11 ENCOUNTER — HOSPITAL ENCOUNTER (OUTPATIENT)
Dept: MAMMOGRAPHY | Facility: CLINIC | Age: 72
Discharge: HOME | End: 2022-01-11
Attending: FAMILY MEDICINE | Admitting: FAMILY MEDICINE
Payer: COMMERCIAL

## 2022-01-11 DIAGNOSIS — Z12.31 VISIT FOR SCREENING MAMMOGRAM: ICD-10-CM

## 2022-01-11 PROCEDURE — 77067 SCR MAMMO BI INCL CAD: CPT

## 2022-05-11 ENCOUNTER — OFFICE VISIT (OUTPATIENT)
Dept: CARDIOLOGY | Facility: CLINIC | Age: 72
End: 2022-05-11
Payer: COMMERCIAL

## 2022-05-11 VITALS
WEIGHT: 141.9 LBS | SYSTOLIC BLOOD PRESSURE: 110 MMHG | BODY MASS INDEX: 24.36 KG/M2 | RESPIRATION RATE: 20 BRPM | HEART RATE: 73 BPM | DIASTOLIC BLOOD PRESSURE: 80 MMHG

## 2022-05-11 DIAGNOSIS — I48.0 PAF (PAROXYSMAL ATRIAL FIBRILLATION) (H): Primary | ICD-10-CM

## 2022-05-11 PROCEDURE — 99214 OFFICE O/P EST MOD 30 MIN: CPT | Performed by: INTERNAL MEDICINE

## 2022-05-11 RX ORDER — DIPHENHYDRAMINE HYDROCHLORIDE 25 MG/1
TABLET ORAL
COMMUNITY

## 2022-05-11 NOTE — LETTER
5/11/2022    Carolann Okeefe MD  [place] [address] [place] [address]    RE: [place] Nava       Dear Colleague,     I had the pleasure of seeing [place] Nava in the [facility].    HEART CARE ENCOUNTER CONSULTATON NOTE      [facility]  [phone]      Assessment/Recommendations   Assessment/Plan:  1.  Paroxysmal atrial fibrillation status post ablation January 2018 with a chads vascular score of 3.  She has been maintained on Eliquis both for a history of atrial fibrillation as well as a history of DVT PE with prior recommendations from hematology to remain on anticoagulation.  She is aware of the risk of bleeding and to avoid nonsteroidal anti-inflammatory medications when possible.    2.  Dyslipidemia.  Coronary calcium score of 0 December 2019.  She does have an elevated total cholesterol and LDL but her preference was to not initiate statins.  She does tell me she is interested in further screening and was going to consider [facility] screening for carotid evaluation and peripheral vascular evaluation and she was going to update me if she pursues these evaluations.    Plan.  Continue with metoprolol and Eliquis  As noted above if she does pursue [facility] screening would request copies of results.       History of Present Illness/Subjective    HPI: [place] Nava is a 71 year old female who is seen in follow-up.  She reports that she has been feeling well.  She has a history of paroxysmal atrial fibrillation and is status post ablation January 2018 and has had no clinical recurrence of atrial fibrillation.  She has been maintained on anticoagulation in part related to the atrial fibrillation history as well as a history of DVT PE and has been seen by hematology in the past who had previously recommended that long-term anticoagulation seemed prudent given the DVT PE history.  She states that she has been active and has not  experienced any complaints of chest pain or shortness of breath.  She did undergo coronary calcium scoring December 2019 with a calcium score of 0.    She is wondering about additional screening including carotid and peripheral vascular screening.  I indicated that insurance would dictate that there had to be a specific reason for these tests.  I did indicate to her that she could consider life line screening as a screening tool and to forward me the results.  She was going to look into this.  She did have an abdominal aortic ultrasound in December 2019 secondary to family history with a negative aortic aneurysm and normal atherosclerotic plaque.  Recent Echocardiogram Results:  Order: [id number]   Status: Final result     Visible to patient: Yes (seen)     Next appt: 05/11/2022 at 10:50 AM in Cardiology (Yung Smyth MD)     Dx: Paroxysmal atrial fibrillation (H)     1 Result Note    Details    Reading Physician Reading Date Result Priority   Bear Wheeler MD  [phone] 8/10/2018 Routine   Provider, Historical 8/10/2018      Narrative & Impression    When compared to the previous study dated 8/30/2017, no significant change.    Left ventricle ejection fraction is normal. The calculated left ventricular ejection fraction is 61%.    Moderate left atrial enlargement    Normal right ventricular size and systolic function.    No hemodynamically significant valvular heart abnormalities.            Recent Coronary Angiogram Results:     Sinus rhythm with short NV with Premature supraventricular complexes Blocked Premature atrial complexes   Otherwise normal ECG   When compared with ECG of 28-OCT-2019 13:25,   Premature supraventricular complexes are now Present   Blocked Premature atrial complexes or sinus pause new   Confirmed by CHELY NUNEZ, JUAN LOC:JN ([id number]) on 7/30/2020 2:56:26 PM  Normal sinus rhythm   Rightward axis   Borderline ECG   When compared with ECG of 12-SEP-2018 11:37,   No significant change was  found   Confirmed by JOEY DE DIOS MD LOC:JN ([id number]) on 10/28/2019 4:08     12/11/19 10:08 AM 12/11/19 10:38 AM [id number] [facility] CT        [id number]           PACS Images     Show images for CT Coronary Calcium Scan         Study Result    Narrative & Impression     The total Agatston calcium score is 0. A calcium score of zero places the individual in the lowest quartile when compared to an age and gender matched control group and implies a low risk of cardiac events in the next 10 years. (less than 1% per   year).        Narrative & Impression   EXAM: US ABDOMINAL AORTA  LOCATION: [facility]  DATE/TIME: 12/11/2019 10:31 AM     INDICATION: Family history of ischemic heart disease and other diseases of the circulatory system  COMPARISON: None.  TECHNIQUE: Transverse and longitudinal images of the aorta. Color flow and spectral Doppler with waveform analysis.     FINDINGS: No abdominal aortic aneurysm.  There is minimal atheromatous plaque in the abdominal aorta.     Normal caliber aorta with maximal diameter of mid/distal aorta 1.7 cm  normal common iliac arteries bilaterally measuring 1.2 cm.     IMPRESSION:  1.  No abdominal aortic aneurysm.          Physical Examination  Review of Systems   Vitals: 110/80, weight 141 pounds, heart rate 73 and regular  Wt Readings from Last 3 Encounters:   03/11/21 61.1 kg (134 lb 12.8 oz)   03/01/21 61.7 kg (136 lb)   07/29/20 64.4 kg (142 lb)       General Appearance:   no distress, normal body habitus   ENT/Mouth:  Facemask in place      EYES:  no scleral icterus, normal conjunctivae   Neck: no carotid bruits or thyromegaly   Chest/Lungs:   lungs are clear to auscultation, no rales or wheezing, , equal chest wall expansion    Cardiovascular:   Regular. Normal first and second heart sounds with no murmurs, rubs, or gallops; the carotid, radial and posterior tibial pulses are intact, Jugular venous pressure within normal limits, no edema  bilaterally    Abdomen:  no organomegaly, masses, bruits, or tenderness; bowel sounds are present   Extremities: no cyanosis or clubbing   Skin: no xanthelasma, warm.    Neurologic: , no tremors     Psychiatric: alert and oriented x3, calm        Please refer above for cardiac ROS details.        Medical History  Surgical History Family History Social History   Past Medical History:   Diagnosis Date     Atrial fibrillation (H)      Atrial fibrillation, persistent (H) 05/2016    CV x2     Bunion of right foot 2014     Diverticulosis 1995     DVT of lower extremity (deep venous thrombosis) (H) 05/2017    rt leg from atrial fib     Essential tremor 1980     Hyperlipidemia 06/2010     Osteopenia 1998     PE (pulmonary thromboembolism) (H) 05/2017    dvt rt leg and PE rt lung     Skin cancer 06/2014     Varicose veins of both lower extremities with pain 1990     Past Surgical History:   Procedure Laterality Date     CARDIOVERSION  06/26/2017    sinus x 10 min, then back to afib     DILATION AND CURETTAGE       EP ABLATION PVI Left 1/9/2018    Procedure: EP Ablation PVI;  Surgeon: Monica Miller MD;  Location: [facility];  Service:      FOOT SURGERY       MOHS MICROGRAPHIC PROCEDURE       Family History   Problem Relation Age of Onset     Tremor Father      Cancer Sister      Hypertension Mother      Colon Cancer Sister      No Known Problems Father      Atrial fibrillation Brother      Pacemaker Brother      No Known Problems Sister      Prostate Cancer Brother         Social History     Socioeconomic History     Marital status: Single     Spouse name: Not on file     Number of children: Not on file     Years of education: Not on file     Highest education level: Not on file   Occupational History     Not on file   Tobacco Use     Smoking status: Never Smoker     Smokeless tobacco: Never Used   Substance and Sexual Activity     Alcohol use: Yes     Alcohol/week: 1.0 standard drink     Comment:  Alcoholic Drinks/day: occasional     Drug use: No     Sexual activity: Not on file     Comment: single    Other Topics Concern     Parent/sibling w/ CABG, MI or angioplasty before 65F 55M? Not Asked   Social History Narrative     Not on file     Social Determinants of Health     Financial Resource Strain: Not on file   Food Insecurity: Not on file   Transportation Needs: Not on file   Physical Activity: Not on file   Stress: Not on file   Social Connections: Not on file   Intimate Partner Violence: Not on file   Housing Stability: Not on file           Medications  Allergies   Current Outpatient Medications   Medication Sig Dispense Refill     Cholecalciferol (VITAMIN D3 PO) Take by mouth daily       clonazePAM (KLONOPIN) 0.5 MG tablet Take one tablet in the morning and 2 tablets at night 270 tablet 0     ELIQUIS 5 MG tablet        Lactobacillus Rhamnosus, GG, ( PROBIOTIC DIGESTIVE CARE) CAPS Take 1 capsule by mouth daily       Magnesium 400 MG TABS        metoprolol succinate ER (TOPROL-XL) 25 MG 24 hr tablet TAKE ONE TABLET BY MOUTH EVERY DAY       Multiple Vitamins-Minerals (ONCOVITE) TABS Take 1 tablet by mouth daily       Omega-3 1000 MG capsule Take 1 g by mouth daily         Allergies   Allergen Reactions     Amiodarone      Tremors much worse on amiodarone          Lab Results    Chemistry/lipid CBC Cardiac Enzymes/BNP/TSH/INR   Recent Labs   Lab Test 08/26/21  0824   CHOL 231*   HDL 67   *   TRIG 69     Recent Labs   Lab Test 08/26/21  0824 04/29/20  0839 04/23/19  0909   * 167* 172*     Recent Labs   Lab Test 08/26/21  0824      POTASSIUM 3.9   CHLORIDE 105   CO2 24   GLC 86   BUN 17   CR 0.83   GFRESTIMATED 70   LAUREN 9.4     Recent Labs   Lab Test 08/26/21  0824 04/29/20  0839 04/23/19  0909   CR 0.83 0.84 0.78     No results for input(s): A1C in the last 81734 hours.       Recent Labs   Lab Test 01/03/18  1128   WBC 5.7   HGB 15.5   HCT 46.5   MCV 95        Recent Labs   Lab  Test 01/03/18  1128   HGB 15.5    No results for input(s): TROPONINI in the last 94221 hours.  No results for input(s): BNP, NTBNPI, NTBNP in the last 96735 hours.  Recent Labs   Lab Test 01/03/18  1128   TSH 2.76     No results for input(s): INR in the last 01941 hours.     Yung Smyth MD        Thank you for allowing me to participate in the care of your patient.      Sincerely,     Yung Smyth MD     [facility]  cc:   Referred Self

## 2022-05-11 NOTE — PROGRESS NOTES
HEART CARE ENCOUNTER CONSULTATON NOTE      [facility]  [phone]      Assessment/Recommendations   Assessment/Plan:  1.  Paroxysmal atrial fibrillation status post ablation January 2018 with a chads vascular score of 3.  She has been maintained on Eliquis both for a history of atrial fibrillation as well as a history of DVT PE with prior recommendations from hematology to remain on anticoagulation.  She is aware of the risk of bleeding and to avoid nonsteroidal anti-inflammatory medications when possible.    2.  Dyslipidemia.  Coronary calcium score of 0 December 2019.  She does have an elevated total cholesterol and LDL but her preference was to not initiate statins.  She does tell me she is interested in further screening and was going to consider [facility] screening for carotid evaluation and peripheral vascular evaluation and she was going to update me if she pursues these evaluations.    Plan.  Continue with metoprolol and Eliquis  As noted above if she does pursue [facility] screening would request copies of results.       History of Present Illness/Subjective    HPI: [place] Nava is a 71 year old female who is seen in follow-up.  She reports that she has been feeling well.  She has a history of paroxysmal atrial fibrillation and is status post ablation January 2018 and has had no clinical recurrence of atrial fibrillation.  She has been maintained on anticoagulation in part related to the atrial fibrillation history as well as a history of DVT PE and has been seen by hematology in the past who had previously recommended that long-term anticoagulation seemed prudent given the DVT PE history.  She states that she has been active and has not experienced any complaints of chest pain or shortness of breath.  She did undergo coronary calcium scoring December 2019 with a calcium score of 0.    She is wondering about additional screening including carotid and peripheral vascular screening.  I  indicated that insurance would dictate that there had to be a specific reason for these tests.  I did indicate to her that she could consider life line screening as a screening tool and to forward me the results.  She was going to look into this.  She did have an abdominal aortic ultrasound in December 2019 secondary to family history with a negative aortic aneurysm and normal atherosclerotic plaque.  Recent Echocardiogram Results:  Order: [id number]  Status: Final result    Visible to patient: Yes (seen)    Next appt: 05/11/2022 at 10:50 AM in Cardiology (Yung Smyth MD)    Dx: Paroxysmal atrial fibrillation (H)    1 Result Note    Details    Reading Physician Reading Date Result Priority   Bear Wheeler MD  [phone] 8/10/2018 Routine   Provider, Historical 8/10/2018      Narrative & Impression    When compared to the previous study dated 8/30/2017, no significant change.    Left ventricle ejection fraction is normal. The calculated left ventricular ejection fraction is 61%.    Moderate left atrial enlargement    Normal right ventricular size and systolic function.    No hemodynamically significant valvular heart abnormalities.            Recent Coronary Angiogram Results:     Sinus rhythm with short NH with Premature supraventricular complexes Blocked Premature atrial complexes   Otherwise normal ECG   When compared with ECG of 28-OCT-2019 13:25,   Premature supraventricular complexes are now Present   Blocked Premature atrial complexes or sinus pause new   Confirmed by JUAN MO MD LOC:JN ([id number]) on 7/30/2020 2:56:26 PM  Normal sinus rhythm   Rightward axis   Borderline ECG   When compared with ECG of 12-SEP-2018 11:37,   No significant change was found   Confirmed by JOEY WHEELER MD LOC:JN ([id number]) on 10/28/2019 4:08     12/11/19 10:08 AM 12/11/19 10:38 AM [id number] [facility] [place]        [id number]           PACS Images     Show images for CT Coronary Calcium  Scan         Study Result    Narrative & Impression     The total Agatston calcium score is 0. A calcium score of zero places the individual in the lowest quartile when compared to an age and gender matched control group and implies a low risk of cardiac events in the next 10 years. (less than 1% per   year).        Narrative & Impression   EXAM: US ABDOMINAL AORTA  LOCATION: [facility]  DATE/TIME: 12/11/2019 10:31 AM     INDICATION: Family history of ischemic heart disease and other diseases of the circulatory system  COMPARISON: None.  TECHNIQUE: Transverse and longitudinal images of the aorta. Color flow and spectral Doppler with waveform analysis.     FINDINGS: No abdominal aortic aneurysm.  There is minimal atheromatous plaque in the abdominal aorta.     Normal caliber aorta with maximal diameter of mid/distal aorta 1.7 cm  normal common iliac arteries bilaterally measuring 1.2 cm.     IMPRESSION:  1.  No abdominal aortic aneurysm.          Physical Examination  Review of Systems   Vitals: 110/80, weight 141 pounds, heart rate 73 and regular  Wt Readings from Last 3 Encounters:   03/11/21 61.1 kg (134 lb 12.8 oz)   03/01/21 61.7 kg (136 lb)   07/29/20 64.4 kg (142 lb)       General Appearance:   no distress, normal body habitus   ENT/Mouth:  Facemask in place      EYES:  no scleral icterus, normal conjunctivae   Neck: no carotid bruits or thyromegaly   Chest/Lungs:   lungs are clear to auscultation, no rales or wheezing, , equal chest wall expansion    Cardiovascular:   Regular. Normal first and second heart sounds with no murmurs, rubs, or gallops; the carotid, radial and posterior tibial pulses are intact, Jugular venous pressure within normal limits, no edema bilaterally    Abdomen:  no organomegaly, masses, bruits, or tenderness; bowel sounds are present   Extremities: no cyanosis or clubbing   Skin: no xanthelasma, warm.    Neurologic: , no tremors     Psychiatric: alert and oriented x3, calm         Please refer above for cardiac ROS details.        Medical History  Surgical History Family History Social History   Past Medical History:   Diagnosis Date     Atrial fibrillation (H)      Atrial fibrillation, persistent (H) 05/2016    CV x2     Bunion of right foot 2014     Diverticulosis 1995     DVT of lower extremity (deep venous thrombosis) (H) 05/2017    rt leg from atrial fib     Essential tremor 1980     Hyperlipidemia 06/2010     Osteopenia 1998     PE (pulmonary thromboembolism) (H) 05/2017    dvt rt leg and PE rt lung     Skin cancer 06/2014     Varicose veins of both lower extremities with pain 1990     Past Surgical History:   Procedure Laterality Date     CARDIOVERSION  06/26/2017    sinus x 10 min, then back to afib     DILATION AND CURETTAGE       EP ABLATION PVI Left 1/9/2018    Procedure: EP Ablation PVI;  Surgeon: Monica iMller MD;  Location: [facility];  Service:      FOOT SURGERY       MOHS MICROGRAPHIC PROCEDURE       Family History   Problem Relation Age of Onset     Tremor Father      Cancer Sister      Hypertension Mother      Colon Cancer Sister      No Known Problems Father      Atrial fibrillation Brother      Pacemaker Brother      No Known Problems Sister      Prostate Cancer Brother         Social History     Socioeconomic History     Marital status: Single     Spouse name: Not on file     Number of children: Not on file     Years of education: Not on file     Highest education level: Not on file   Occupational History     Not on file   Tobacco Use     Smoking status: Never Smoker     Smokeless tobacco: Never Used   Substance and Sexual Activity     Alcohol use: Yes     Alcohol/week: 1.0 standard drink     Comment: Alcoholic Drinks/day: occasional     Drug use: No     Sexual activity: Not on file     Comment: single    Other Topics Concern     Parent/sibling w/ CABG, MI or angioplasty before 65F 55M? Not Asked   Social History Narrative     Not on file      Social Determinants of Health     Financial Resource Strain: Not on file   Food Insecurity: Not on file   Transportation Needs: Not on file   Physical Activity: Not on file   Stress: Not on file   Social Connections: Not on file   Intimate Partner Violence: Not on file   Housing Stability: Not on file           Medications  Allergies   Current Outpatient Medications   Medication Sig Dispense Refill     Cholecalciferol (VITAMIN D3 PO) Take by mouth daily       clonazePAM (KLONOPIN) 0.5 MG tablet Take one tablet in the morning and 2 tablets at night 270 tablet 0     ELIQUIS 5 MG tablet        Lactobacillus Rhamnosus, GG, (RA PROBIOTIC DIGESTIVE CARE) CAPS Take 1 capsule by mouth daily       Magnesium 400 MG TABS        metoprolol succinate ER (TOPROL-XL) 25 MG 24 hr tablet TAKE ONE TABLET BY MOUTH EVERY DAY       Multiple Vitamins-Minerals (ONCOVITE) TABS Take 1 tablet by mouth daily       Omega-3 1000 MG capsule Take 1 g by mouth daily         Allergies   Allergen Reactions     Amiodarone      Tremors much worse on amiodarone          Lab Results    Chemistry/lipid CBC Cardiac Enzymes/BNP/TSH/INR   Recent Labs   Lab Test 08/26/21  0824   CHOL 231*   HDL 67   *   TRIG 69     Recent Labs   Lab Test 08/26/21  0824 04/29/20  0839 04/23/19  0909   * 167* 172*     Recent Labs   Lab Test 08/26/21  0824      POTASSIUM 3.9   CHLORIDE 105   CO2 24   GLC 86   BUN 17   CR 0.83   GFRESTIMATED 70   LAUREN 9.4     Recent Labs   Lab Test 08/26/21  0824 04/29/20  0839 04/23/19  0909   CR 0.83 0.84 0.78     No results for input(s): A1C in the last 24126 hours.       Recent Labs   Lab Test 01/03/18  1128   WBC 5.7   HGB 15.5   HCT 46.5   MCV 95        Recent Labs   Lab Test 01/03/18  1128   HGB 15.5    No results for input(s): TROPONINI in the last 61068 hours.  No results for input(s): BNP, NTBNPI, NTBNP in the last 80121 hours.  Recent Labs   Lab Test 01/03/18  1128   TSH 2.76     No results for input(s):  INR in the last 91542 hours.     Yung Smyth MD

## 2022-05-11 NOTE — PATIENT INSTRUCTIONS
Please look into lifescreening and if you do get the results please send me a copy.If you arent able to have the lifescreening let me know and I can probably  have your insurance agree to the carotid ultrasound.My nurse is Mandie and her number is [phone]

## 2022-05-21 ENCOUNTER — HEALTH MAINTENANCE LETTER (OUTPATIENT)
Age: 72
End: 2022-05-21

## 2022-06-27 ENCOUNTER — TRANSFERRED RECORDS (OUTPATIENT)
Dept: HEALTH INFORMATION MANAGEMENT | Facility: CLINIC | Age: 72
End: 2022-06-27

## 2022-07-07 ENCOUNTER — TELEPHONE (OUTPATIENT)
Dept: FAMILY MEDICINE | Facility: CLINIC | Age: 72
End: 2022-07-07

## 2022-07-07 NOTE — TELEPHONE ENCOUNTER
Kalia with [facility] calling with verbal orders.    Resumption of care  Skilled Nursing   1x a week for 5 weeks medication monitoring     Question for the provider: do you wan them to check her INR?    INR today was 1.2    [facility]   Kalia  [phone]

## 2022-07-08 NOTE — TELEPHONE ENCOUNTER
Contacted Kalia with [facility].      Patient's PCP is not at this clinic.     Writer relayed PCP's clinic information to Kalia.

## 2022-08-21 ENCOUNTER — HOSPITAL ENCOUNTER (EMERGENCY)
Facility: HOSPITAL | Age: 72
Discharge: STILL ADMITTED OR ADMINISTRATIVE | End: 2022-08-21
Payer: COMMERCIAL

## 2022-08-22 NOTE — ED NOTES
Expected Patient Referral to ED  8:01 PM    Referring Clinic/Provider:  [facility]    Reason for referral/Clinical facts:  CHF exacerbation on 5 liters oxygen. Hypervolemic on exam. Lower extremity edema. covid negative. CXR: intersistial fibrosis and pulmonary edema.     Recommendations provided:  Send to ED for further evaluation    Caller was informed that this institution does possess the capabilities and/or resources to provide for patient and should be transferred to our facility.    Discussed that if direct admit is sought and any hurdles are encountered, this ED would be happy to see the patient and evaluate.    Informed caller that recommendations provided are recommendations based only on the facts provided and that they responsible to accept or reject the advice, or to seek a formal in person consultation as needed and that this ED will see/treat patient should they arrive.      Camilo Briceno MD  [facility] EMERGENCY DEPARTMENT  [address] [place] [id number]  [phone]       Camilo Briceno MD  08/21/22 2002

## 2022-09-18 ENCOUNTER — HEALTH MAINTENANCE LETTER (OUTPATIENT)
Age: 72
End: 2022-09-18

## 2022-09-21 DIAGNOSIS — J38.5 LARYNGEAL SPASM: ICD-10-CM

## 2022-09-21 DIAGNOSIS — J38.3 OTHER DISEASES OF VOCAL CORDS: Primary | ICD-10-CM

## 2022-10-04 ENCOUNTER — OFFICE VISIT (OUTPATIENT)
Dept: OTOLARYNGOLOGY | Facility: CLINIC | Age: 72
End: 2022-10-04
Payer: COMMERCIAL

## 2022-10-04 DIAGNOSIS — J38.3 OTHER DISEASES OF VOCAL CORDS: Primary | ICD-10-CM

## 2022-10-04 DIAGNOSIS — J38.5 LARYNGEAL SPASM: ICD-10-CM

## 2022-10-04 PROCEDURE — 99207 PR NO CHARGE LOS: CPT | Performed by: OTOLARYNGOLOGY

## 2022-10-04 PROCEDURE — 64617 CHEMODENER MUSCLE LARYNX EMG: CPT | Mod: 50 | Performed by: OTOLARYNGOLOGY

## 2022-10-04 RX ORDER — PREDNISOLONE ACETATE 10 MG/ML
SUSPENSION/ DROPS OPHTHALMIC
COMMUNITY
Start: 2022-03-24

## 2022-10-04 RX ORDER — NEOMYCIN SULFATE, POLYMYXIN B SULFATE, AND DEXAMETHASONE 3.5; 10000; 1 MG/G; [USP'U]/G; MG/G
OINTMENT OPHTHALMIC
COMMUNITY
Start: 2022-08-19

## 2022-10-04 RX ORDER — METOPROLOL TARTRATE 25 MG/1
TABLET, FILM COATED ORAL
COMMUNITY
Start: 2022-08-14

## 2022-10-04 ASSESSMENT — PAIN SCALES - GENERAL: PAINLEVEL: NO PAIN (0)

## 2022-10-04 NOTE — NURSING NOTE
There were no vitals taken for this visit.    Patient denies any major fluctuations in weight.  Patient denies pain.  Daria Nye LPN

## 2022-10-04 NOTE — PROGRESS NOTES
[place] Nava is a 72 year old female with a history of adductor laryngeal dystonia, tremor and laryngeal spasm.  she was last injected on 1/28/2020. she had a good response to the last injection. The last dose given was 0.25 units into each thyroarytenoid muscle.  After the injection  she had a breathy dysphonia for 2-3 weeks.There was no Dysphagia or Dyspnea.  The response lasted for 6 months.   Our plan is to give 0.25 units of Botulinum A toxin into  each thyroarytenoid muscle today.      PROCEDURE: After obtaining consent, the patient was placed in the supine position. Ground and reference electrodes were applied. The anterior neck was cleaned with an alcohol swab.  Using a 27-gauge, unipolar electromyography needle, the larynx was entered through the cricothyroid space.  0.25 units of botulinum A toxin was injected into each thyroarytenoid muscle.  There was a Strong EMG response to phonation on the left side and a Strong EMG response to phonation on the right side.  The total amount of botulinum A toxin delivered today was 0.5 units. An additional 5 units of botulinum A toxin was necessarily wasted in preparation for the injection. she tolerated the procedure well and left the clinic after a short observation period.         The EMG was necessary specifically in this case to identify areas of muscle overactivity as well as to access the thyroarytenoid muscle which is beneath the thyroid cartilage and is not otherwise directly accessible without EMG guidance.    PLAN: I will have her  follow up on a PRN basis. It is anticipated that repeat injections will be required over the long term.    She was also concerned about some phlegm in her throat that has been intermittent over the last few months.  This was reviewed with therapy recommended and an antireflux diet and saline nasal spray.  Handouts for laryngopharyngeal reflux and laryngeal dystonia were given since its been almost 2 years since her last  visit.

## 2022-10-04 NOTE — PATIENT INSTRUCTIONS
1.  You were seen in the ENT Clinic today by . If you have any questions or concerns after your appointment, please call [phone]. Press option #1 for scheduling related needs. Press option #3 for Nurse advice.    2.   has recommended the following:   Botox Treatment and the Nervous System    When a nerve gets the signal from the brain to fire, chemicals called neurotransmitters are released from the nerve into the muscle fibers, causing the muscle to contract. Botox works by preventing the release of the neurotransmitters. This prevents the contraction of the muscle. Actually, the Botox is injected in such small amounts that it only affects the muscle fibers near the injection site, not the entire muscle. Therefore, the muscle contraction is weakened, but not entirely eliminated.    We talk about the Botox wearing off, but that is not quite what happens. The tiny ends of the nerve fibers near the injection site eventually die off from the Botox. However, new nerve endings grow, much like the roots of a plant. The regrowth of active nerve endings allows the release of neurotransmitters again, so that the muscle contraction is no longer weakened. The gradual strengthening of muscle contraction makes it feel as if the Botox effect is wearing off.    Botox can be used to treat other voice disorders in addition to SD. These include Benign Essential Tremor and severe Muscle Tension Dysphonia.    If you are going to receive Botox injections    How long does it take?    The injection process will only take a few minutes. Then we ll ask you to wait a few minutes after your injection, to make sure you feel OK.    Does it hurt?    Most people say it s a little painful for a short time, like getting a tetanus shot. No anesthesia is used. The most people prefer avoiding additional injection. It s helpful if you relax, just like that tetanus shot. It s fine if you want to have someone come with you and hold  your hand.    How do we know the needle is in the right muscle?    At the [facility], we use EMG (electromyographic) guidance. That means the needle is attached to a tiny wire that sends a signal to the electromyograph machine, which in turns gives a signal about the activity of the muscle. When the needle is in place, we will have you activate the muscle by performing a specific task such as saying  eee  or sniffing. Electrical energy caused by the contraction is sent through the wire to the electromyograph, and a  crackling  sound confirms that the needle is in the correct muscle. Before your injection, a round disk called an electrode will be applied to your forehead and another strapped around your wrist. These provide grounding and reference for the electrical signal. Typically, there will be two injections, one for each side.    How soon does the injection take effect?    Usually in the next three days. Sometimes people can feel a difference the next morning, but more often it takes a day or two. If you don t feel any effect within a week, call us. Sometimes the strongest effect is felt first, and other times the effect builds over the first week. Individual reactions are hard to predict.    What are the side effects?    Side effects are minimal, because the amount of Botox is so small, and the area it affects is very small. Both side effects are related to the intended purpose of the Botox, to weaken the vocal folds.    In the first week, a few people notice choking or coughing when they drink thin liquids like water. It is the same effect when you get water  down the wrong pipe.  To avoid this, sip carefully; don t chug-a-lug. Many people cough a little when they first take a drink after the injection takes effect, and then they remember to sip more slowly. It is rarely a problem, and rarely lasts more than a week.    The other, more common side effect is that your voice may become weak and breathy  for some period of time after the injection. This is because the vocal folds are weak and cannot come together strongly to provide a strong vibration. This is what prevents the spasm. In time the breathiness resolves and the voice becomes stronger, but still does not spasm. The degree of breathiness and the length of time the voice stays breathy are related to the dose of Botox, and to the individual reaction. In general, the larger the dose, the longer the voice stays breathy, but also, the longer the spasms are prevented after the breathiness resolves.    Some people cannot tolerate any breathiness or weakness in their voice, and therefore they have frequent, small doses of Botox. Others can tolerate several weeks of breathiness, but in exchange they get more months of improved voice quality. It may take a few injections before you know your best dose and timing schedule.    How will I know when I should get another injection?    The spasms will return gradually, and at first they will not be as strong as they were before the injection. Spasms typically get worse over a period of weeks or months. During that time, techniques you learned in voice therapy are the most helpful.If the spasms start coming back during the third week of the month, you can probably wait another month. But if they come back early in the month, you may want to make your appointment for the end of the month. One thing is certain: no one ever wants to wait so long they get back to the level of spasms they had before their first injection!    For more information on this topic, visit our web site at [email or web address]    Laryngopharyngeal Reflux Disorder (LPRD)     Reflux is a term that describes stomach contents that back up above the stomach. The stomach contents range depending on what is in the stomach at the time of this happening from very acidic to non-acidic. When the stomach contents backup happens to the level of the  esophagus - this can cause indigestion, stomach upset, pain, heartburn, regurgitation. This is known as Gastroesophageal Reflux Disorder (GERD). When the stomach contents back up even higher to your throat - to the level of the entrance of your esophagus and your voice box or larynx - this is called extraesophageal reflux - atypical reflux or laryngopharyngeal reflux and can cause what is known as Laryngopharyngeal Reflux Disorder (LPRD).      Gastroesophageal Reflux Disorder (GERD) is a well-known problem in this country, with advertisements for the many medications used to treat it commonly seen on TV.  GERD occurs when stomach acid makes its way back up the esophagus, causing indigestion, stomach upset, and, especially, heartburn.  The acid may travel all the way up the esophagus, and spill over onto the larynx.  This is especially common during sleep, when the individual is lying down, and acid does not have to fight gravity to move up the esophagus.  When symptoms of acid reflux are more apparent in the larynx or pharynx, then the disorder may be called Laryngopharyngeal Reflux Disorder (LPRD).    Some of the symptoms include:     a dry, choking sensation, especially during the night  voice quality that is worst in the morning  a raw, burning sensation in the throat  pain in the throat, neck, or running from the back of the chin along the neck  frequent coughing, or desire to clear the throat, or mucus sensation  a rough, gritty voice quality  decline in voice quality, or comfort, with continued voice use  a sour taste in your mouth upon waking up  a lump in the throat or globus sensation   postnasal drip, without other nasal and allergic symptoms   sensation of difficulty inhaling - like paradoxical vocal fold motion or laryngospasm      Interestingly, many of the patients in the [facility] who have laryngeal symptoms (LPRD) do not have classic symptoms of GERD, such as stomach discomfort and  heartburn.     Why does LPRD cause throat symptoms:   In the case of LPRD, when the stomach content spills over onto the larynx, it irritates the lining of the throat and the vocal folds and creates inflammation, which causes the vocal folds to vibrate unevenly.  Coughing and throat-clearing from the irritation can make the inflammation worse.  The resulting voice disorder is often related to the poor vibratory quality of the inflamed vocal folds and the muscle tension created by effortful attempts at compensation.     Anti-reflux medication and dietary precautions are the first line of treatment for GERD/LPRD.  Discuss medications with your doctor.  Some medications may have side effects if taken for a long time, and it will be important for you to consider these.  Functional voice therapy is useful to teach techniques for reducing effortful compensation and instruct the individual in improved laryngeal hygiene.     At the [facility], we do not hand out a list of foods and beverages that must be avoided.  Rather, we educate about types of foods and beverages known to cause reflux, and we will encourage you to systematically investigate which foods stimulate your own reflux.  See the back of this page for dietary and lifestyle precautions for GERD/LPRD.  Also, you are encouraged to manage reflux under the care of a gastrointestinal specialist.     Lifestyle changes that may help reduce symptoms of GERD/LPRD  eat smaller meals more frequently throughout the day, rather than three large meals  elevate the head of your bed 2-3 inches (don't just use extra pillows for your head)  avoid clothes that fit tightly around the waist  avoid exercising or lying down within 2-3 hours of eating (don't eat dinner late at night)  Types of foods known to trigger increased stomach acid  spicy food (such as chili or jalapeño peppers, Silvio or Szechuan spices)  acidic foods such as tomato products or citrus products  greasy  foods  caffeine  alcohol  carbonation  roughage, such as popcorn and peanuts, or raw vegetables  dairy products   strong mint such as peppermint candies  chocolate     IMPORTANT NOTE!  Before you read the information in the box and think you'll only be able to eat bread and oatmeal for the rest of your life, remember that reflux varies person to person.  One person may not have problems with coffee but is bothered by red wine, while another person may not be affected by red wine but can't have tomato sauce.  If you choose to restrict with your diet, try changing things one group at a time to see what makes the difference for you.       Regarding the use of anti-reflux medications, one of the most common varieties is called a Proton Pump Inhibitor or PPI.  This class of medication includes brand name drugs like Prilosec and its generic form Omeprazole.  For most PPI to get the best benefit from this type of medication it should be taken on an empty stomach 30-60 minutes before you eat.  As with any medication, speak to your pharmacist. Y if you want to be sure you are taking it correctly, and that it does not interact with any of your home medications - especially antiplatelet medication (like plavix) or thyroid replacement medication (like synthroid). ( speak to your pharmacist)         ==   List of Food Items for the Low-Acid Diet:  Agave  Aloe vera   Apple  Artifical sweetener (max 2 tsp per day)  Avocado   Bagels and (non-fruit) low-fat muffins  Banana  Beans (black, red, lima, lentils, etc)  Bread (especially whole grain and rye)  Caramel (maximum 4 tablespoons per work)  Celery  Chamomile tea (most other herbal teas are not acceptable)  Chicken (grilled, broiled, baked, or steamed; no skin)  Chicken stock or bouillon Coffee (max 1 cup per day; best with milk)  Egg whites  Fennel  Fish (grilled, broiled, baked, or steamed)  Sarah (sarah root, powdered, or preserved)  Luis Alberto crackers  Herbs (excluding all  peppers, citrus, garlic, and mustard)  Honey  Melon (honeydew, cantaloupe, watermelon)  Mushrooms (raw or cooked)  Oatmeal (all whole-grain cereals)  Olive oil (maximum 2 tablespoons per day)  Parsley  Pasta (with nonacidic sauce)  Popcorn (plain or salted, no butter)  Potatoes (all of the root vegetables except onions)  Rice (healthy, especially brown rice, a staple during induction)  Skim milk (soy or Lactaid skim milk)  Soups (great homemade with noodles and vegetables)  Tofu  Turkey breast (organic, no skin)  Turnip  Vegetables (raw or cooked, but no onion, tomato, or peppers)  Vinaigrette (maximum 1 tablespoon per day; toss salads)  Whole-grain breads, crackers, and breakfast cereals        3.  Plan is to return to clinic as needed      Savita Loredo LPN  [phone]  [facility] - Otolaryngology

## 2022-10-04 NOTE — NURSING NOTE
Invasive Procedure Safety Checklist  Procedure:  botox    Responsible person(s):  Complete sections as appropriate and electronically sign and date below.    Staff/Provider  Consent documentation on chart:  YES  H&P is not applicable (when straight local anesthesia is used).    Procedure Team  Completed by comparing informed consent documentation, information on the patient record and/or the marked surgical site, and discussion with the patient/guardian.     Verified:  (Select all that apply)  Patient identification (two indicators)  Procedure to be performed  Procedure site and /or laterality and/or level  Consent  Procedure site:  Site can not be marked due to location.  Provider Daniels - Site/Laterality/Level:  No Level or Structure  Staff/Provider:  No images    Procedure Team:  *Pause for the Cause* verbal and active participation of team members- verify:  Patient name:  YES  Procedure to be performed:  YES  Site, laterality and level, noting patient position:  YES    Above steps completed as applicable (Electronic Signature, Title, Date):    Savita Loredo LPN      Note:  Any incidents of wrong patient, wrong procedure, or wrong site are reported using the Occurrence Process already in place.  The occurrence form is required to be completed immediately with this type of event.

## 2023-02-28 ENCOUNTER — OFFICE VISIT (OUTPATIENT)
Dept: OTOLARYNGOLOGY | Facility: CLINIC | Age: 73
End: 2023-02-28
Payer: COMMERCIAL

## 2023-02-28 VITALS — WEIGHT: 141 LBS | BODY MASS INDEX: 24.2 KG/M2

## 2023-02-28 DIAGNOSIS — J38.5 LARYNGEAL SPASM: ICD-10-CM

## 2023-02-28 DIAGNOSIS — J38.3 OTHER DISEASES OF VOCAL CORDS: Primary | ICD-10-CM

## 2023-02-28 PROCEDURE — 64617 CHEMODENER MUSCLE LARYNX EMG: CPT | Mod: 50 | Performed by: OTOLARYNGOLOGY

## 2023-02-28 ASSESSMENT — PAIN SCALES - GENERAL: PAINLEVEL: NO PAIN (0)

## 2023-02-28 NOTE — PROGRESS NOTES
[place] Nava is a 72 year old female with a history of adductor laryngeal dystonia and laryngeal spasm.  she was last injected on 10/4/2022. she had a good response to the last injection. The last dose given was 0.25 units into each thyroarytenoid muscle.  After the injection  she had a breathy dysphonia for 0.5 weeks.There was no Dysphagia or Dyspnea.  The response lasted for 5 months.   Our plan is to give 0.25 units of Botulinum A toxin into  each thyroarytenoid muscle today.      PROCEDURE: After obtaining consent, the patient was placed in the supine position. Ground and reference electrodes were applied. The anterior neck was cleaned with an alcohol swab.  Using a 27-gauge, unipolar electromyography needle, the larynx was entered through the cricothyroid space.  0.25 units of botulinum A toxin was injected into each thyroarytenoid muscle.  There was a Strong EMG response to phonation on the left side and a Strong EMG response to phonation on the right side.  The total amount of botulinum A toxin delivered today was 0.5 units. An additional 5 units of botulinum A toxin was necessarily wasted in preparation for the injection. she tolerated the procedure well and left the clinic after a short observation period.         The EMG was necessary specifically in this case to identify areas of muscle overactivity as well as to access the thyroarytenoid muscle which is beneath the thyroid cartilage and is not otherwise directly accessible without EMG guidance.    PLAN: I will have her  follow up on a PRN basis. It is anticipated that repeat injections will be required over the long term.

## 2023-02-28 NOTE — PATIENT INSTRUCTIONS
1.  You were seen in the ENT Clinic today by . If you have any questions or concerns after your appointment, please call [phone]. Press option #1 for scheduling related needs. Press option #3 for Nurse advice.    2. Plan is to return to clinic 6/13/23 for repeat botox injection      Savita Loredo LPN  [phone]  [facility] - Otolaryngology

## 2023-02-28 NOTE — LETTER
2/28/2023       RE: Florida Nava  [address] [place] [address]     Dear Colleague,    Thank you for referring your patient, [place] Nava, to the [facility] [place] at [facility]. Please see a copy of my visit note below.    Florida Nava is a 72 year old female with a history of adductor laryngeal dystonia and laryngeal spasm.  she was last injected on 10/4/2022. she had a good response to the last injection. The last dose given was 0.25 units into each thyroarytenoid muscle.  After the injection  she had a breathy dysphonia for 0.5 weeks.There was no Dysphagia or Dyspnea.  The response lasted for 5 months.   Our plan is to give 0.25 units of Botulinum A toxin into  each thyroarytenoid muscle today.      PROCEDURE: After obtaining consent, the patient was placed in the supine position. Ground and reference electrodes were applied. The anterior neck was cleaned with an alcohol swab.  Using a 27-gauge, unipolar electromyography needle, the larynx was entered through the cricothyroid space.  0.25 units of botulinum A toxin was injected into each thyroarytenoid muscle.  There was a Strong EMG response to phonation on the left side and a Strong EMG response to phonation on the right side.  The total amount of botulinum A toxin delivered today was 0.5 units. An additional 5 units of botulinum A toxin was necessarily wasted in preparation for the injection. she tolerated the procedure well and left the clinic after a short observation period.         The EMG was necessary specifically in this case to identify areas of muscle overactivity as well as to access the thyroarytenoid muscle which is beneath the thyroid cartilage and is not otherwise directly accessible without EMG guidance.    PLAN: I will have her  follow up on a PRN basis. It is anticipated that repeat injections will be required over the long term.      Jose D  Ivania Pugh MD

## 2023-03-13 ENCOUNTER — TELEPHONE (OUTPATIENT)
Dept: CARDIOLOGY | Facility: CLINIC | Age: 73
End: 2023-03-13
Payer: COMMERCIAL

## 2023-03-13 DIAGNOSIS — I48.0 PAF (PAROXYSMAL ATRIAL FIBRILLATION) (H): Primary | ICD-10-CM

## 2023-03-13 DIAGNOSIS — R47.89 OTHER SPEECH DISTURBANCES: ICD-10-CM

## 2023-03-13 NOTE — TELEPHONE ENCOUNTER
Recommendations discussed with pt.  Pt verbalized understanding and had no questions.  Pt was given phone # for central scheduling, call transferred to heart clinic scheduling for Dr Haja sanchez

## 2023-03-13 NOTE — TELEPHONE ENCOUNTER
Pt with [facility] on the line - they are trying to figure out if her insurance will cover [employer] Screening.  Informed insurance what screening consists of.  They had no further questions.  -douglas

## 2023-03-13 NOTE — TELEPHONE ENCOUNTER
Carotid us ordered.  -ra    ===View-only below this line===  ----- Message -----  From: Yung Smyth MD  Sent: 3/13/2023  12:06 PM CDT  To: Mandie Robertson RN    I have never placed an order for [employer] screening.  Have not even heard of insurance approving that.  If she believes that she will have approval for carotid ultrasound and we can just do it through our office but again would need additional clarification.

## 2023-04-12 ENCOUNTER — HOSPITAL ENCOUNTER (OUTPATIENT)
Dept: ULTRASOUND IMAGING | Facility: CLINIC | Age: 73
Discharge: HOME | End: 2023-04-12
Attending: INTERNAL MEDICINE | Admitting: INTERNAL MEDICINE
Payer: COMMERCIAL

## 2023-04-12 DIAGNOSIS — I48.0 PAF (PAROXYSMAL ATRIAL FIBRILLATION) (H): ICD-10-CM

## 2023-04-12 DIAGNOSIS — R47.89 OTHER SPEECH DISTURBANCES: ICD-10-CM

## 2023-04-12 PROCEDURE — 93880 EXTRACRANIAL BILAT STUDY: CPT

## 2023-04-17 NOTE — RESULT ENCOUNTER NOTE
Carotid shows mild plaque with less than 50% narrowing, large thyroid nodule with radiology recommending a dedicated thyroid us, we can order that study but can we please have her primary alerted to the test results, given she has mild plaque she should have repeat cholesterol numbers which were not at goal a few years ago, she has follow up in 6/2023 with me, can we please discuss with her aand let me know  ty  ty mdg

## 2023-04-20 ENCOUNTER — HOSPITAL ENCOUNTER (OUTPATIENT)
Dept: MAMMOGRAPHY | Facility: CLINIC | Age: 73
Discharge: HOME | End: 2023-04-20
Attending: FAMILY MEDICINE | Admitting: FAMILY MEDICINE
Payer: COMMERCIAL

## 2023-04-20 DIAGNOSIS — Z12.31 VISIT FOR SCREENING MAMMOGRAM: ICD-10-CM

## 2023-04-20 PROCEDURE — 77067 SCR MAMMO BI INCL CAD: CPT

## 2023-04-24 ENCOUNTER — LAB REQUISITION (OUTPATIENT)
Dept: LAB | Facility: CLINIC | Age: 73
End: 2023-04-24
Payer: COMMERCIAL

## 2023-04-24 ENCOUNTER — TRANSFERRED RECORDS (OUTPATIENT)
Dept: HEALTH INFORMATION MANAGEMENT | Facility: CLINIC | Age: 73
End: 2023-04-24

## 2023-04-24 DIAGNOSIS — E78.00 PURE HYPERCHOLESTEROLEMIA, UNSPECIFIED: ICD-10-CM

## 2023-04-24 DIAGNOSIS — E04.1 NONTOXIC SINGLE THYROID NODULE: ICD-10-CM

## 2023-04-24 DIAGNOSIS — I48.91 UNSPECIFIED ATRIAL FIBRILLATION (H): ICD-10-CM

## 2023-04-24 LAB
ANION GAP SERPL CALCULATED.3IONS-SCNC: 17 MMOL/L (ref 7–15)
BUN SERPL-MCNC: 21.5 MG/DL (ref 8–23)
CALCIUM SERPL-MCNC: 9.4 MG/DL (ref 8.8–10.2)
CHLORIDE SERPL-SCNC: 100 MMOL/L (ref 98–107)
CHOLEST SERPL-MCNC: 219 MG/DL
CREAT SERPL-MCNC: 0.93 MG/DL (ref 0.51–0.95)
DEPRECATED HCO3 PLAS-SCNC: 22 MMOL/L (ref 22–29)
GFR SERPL CREATININE-BSD FRML MDRD: 64 ML/MIN/1.73M2
GLUCOSE SERPL-MCNC: 87 MG/DL (ref 70–99)
HDLC SERPL-MCNC: 63 MG/DL
LDLC SERPL CALC-MCNC: 141 MG/DL
NONHDLC SERPL-MCNC: 156 MG/DL
POTASSIUM SERPL-SCNC: 4 MMOL/L (ref 3.4–5.3)
SODIUM SERPL-SCNC: 139 MMOL/L (ref 136–145)
TRIGL SERPL-MCNC: 75 MG/DL
TSH SERPL DL<=0.005 MIU/L-ACNC: 0.49 UIU/ML (ref 0.3–4.2)

## 2023-04-24 PROCEDURE — 80048 BASIC METABOLIC PNL TOTAL CA: CPT | Mod: ORL | Performed by: FAMILY MEDICINE

## 2023-04-24 PROCEDURE — 80061 LIPID PANEL: CPT | Mod: ORL | Performed by: FAMILY MEDICINE

## 2023-04-24 PROCEDURE — 84443 ASSAY THYROID STIM HORMONE: CPT | Mod: ORL | Performed by: FAMILY MEDICINE

## 2023-04-24 PROCEDURE — 82306 VITAMIN D 25 HYDROXY: CPT | Mod: ORL | Performed by: FAMILY MEDICINE

## 2023-04-25 ENCOUNTER — LAB REQUISITION (OUTPATIENT)
Dept: LAB | Facility: CLINIC | Age: 73
End: 2023-04-25
Payer: COMMERCIAL

## 2023-04-25 DIAGNOSIS — M85.80 OTHER SPECIFIED DISORDERS OF BONE DENSITY AND STRUCTURE, UNSPECIFIED SITE: ICD-10-CM

## 2023-04-26 LAB — DEPRECATED CALCIDIOL+CALCIFEROL SERPL-MC: 42 UG/L (ref 20–75)

## 2023-04-28 ENCOUNTER — HOSPITAL ENCOUNTER (OUTPATIENT)
Dept: ULTRASOUND IMAGING | Facility: CLINIC | Age: 73
Discharge: HOME | End: 2023-04-28
Attending: FAMILY MEDICINE | Admitting: FAMILY MEDICINE
Payer: COMMERCIAL

## 2023-04-28 DIAGNOSIS — E04.1 RIGHT THYROID NODULE: ICD-10-CM

## 2023-04-28 PROCEDURE — 76536 US EXAM OF HEAD AND NECK: CPT

## 2023-05-12 ENCOUNTER — HOSPITAL ENCOUNTER (OUTPATIENT)
Dept: ULTRASOUND IMAGING | Facility: CLINIC | Age: 73
Discharge: HOME | End: 2023-05-12
Attending: FAMILY MEDICINE | Admitting: RADIOLOGY
Payer: COMMERCIAL

## 2023-05-12 DIAGNOSIS — E04.1 THYROID NODULE: Primary | ICD-10-CM

## 2023-05-12 PROCEDURE — 272N000710 US BIOPSY THYROID FINE NEEDLE ASPIRATION

## 2023-05-12 PROCEDURE — 88173 CYTOPATH EVAL FNA REPORT: CPT | Mod: TC

## 2023-05-16 LAB
PATH REPORT.COMMENTS IMP SPEC: NORMAL
PATH REPORT.FINAL DX SPEC: NORMAL
PATH REPORT.GROSS SPEC: NORMAL
PATH REPORT.MICROSCOPIC SPEC OTHER STN: NORMAL
PATH REPORT.RELEVANT HX SPEC: NORMAL

## 2023-05-16 PROCEDURE — 88172 CYTP DX EVAL FNA 1ST EA SITE: CPT | Mod: 26 | Performed by: PATHOLOGY

## 2023-05-16 PROCEDURE — 88173 CYTOPATH EVAL FNA REPORT: CPT | Mod: 26 | Performed by: PATHOLOGY

## 2023-06-04 ENCOUNTER — HEALTH MAINTENANCE LETTER (OUTPATIENT)
Age: 73
End: 2023-06-04

## 2023-07-11 ENCOUNTER — OFFICE VISIT (OUTPATIENT)
Dept: OTOLARYNGOLOGY | Facility: CLINIC | Age: 73
End: 2023-07-11
Payer: COMMERCIAL

## 2023-07-11 VITALS — BODY MASS INDEX: 24.03 KG/M2 | WEIGHT: 140 LBS

## 2023-07-11 DIAGNOSIS — J38.5 LARYNGEAL SPASM: ICD-10-CM

## 2023-07-11 DIAGNOSIS — J38.3 OTHER DISEASES OF VOCAL CORDS: Primary | ICD-10-CM

## 2023-07-11 PROCEDURE — 64617 CHEMODENER MUSCLE LARYNX EMG: CPT | Mod: 50 | Performed by: OTOLARYNGOLOGY

## 2023-07-11 ASSESSMENT — PAIN SCALES - GENERAL: PAINLEVEL: NO PAIN (0)

## 2023-07-11 NOTE — LETTER
7/11/2023       RE: Florida Nava  [address] [place] [address]     Dear Colleague,    Thank you for referring your patient, [place] Nava, to the [facility] [place] at [facility]. Please see a copy of my visit note below.    Florida Nava is a 73 year old female with a history of adductor laryngeal dystonia and laryngeal spasm.  she was last injected on 2/28/2023. she had a good response to the last injection. The last dose given was 0.25 units into each thyroarytenoid muscle.  After the injection  she had a breathy dysphonia for 1 week. There was no Dysphagia or Dyspnea.  The response lasted for 3-4 months.   Our plan is to give 0.25 units of Botulinum A toxin into  each thyroarytenoid muscle today.      PROCEDURE: After obtaining consent, the patient was placed in the supine position. Ground and reference electrodes were applied. The anterior neck was cleaned with an alcohol swab.  Using a 27-gauge, unipolar electromyography needle, the larynx was entered through the cricothyroid space.  0.25 units of botulinum A toxin was injected into each thyroarytenoid muscle.  There was a Strong EMG response to phonation on the left side and a Strong EMG response to phonation on the right side.  The total amount of botulinum A toxin delivered today was 0.5 units. An additional 5 units of botulinum A toxin was necessarily wasted in preparation for the injection. she tolerated the procedure well and left the clinic after a short observation period.         The EMG was necessary specifically in this case to identify areas of muscle overactivity as well as to access the thyroarytenoid muscle which is beneath the thyroid cartilage and is not otherwise directly accessible without EMG guidance.    PLAN: I will have her  follow up on a PRN basis. It is anticipated that repeat injections will be required over the long  term.            Again, thank you for allowing me to participate in the care of your patient.      Sincerely,    Jose D Pugh MD

## 2023-07-11 NOTE — PATIENT INSTRUCTIONS
1.  You were seen in the ENT Clinic today by . If you have any questions or concerns after your appointment, please call [phone]. Press option #1 for scheduling related needs. Press option #3 for Nurse advice.    2. Plan is to return to clinic 10/24/23 for repeat botox injection      Savita Loredo LPN  [phone]  [facility] - Otolaryngology

## 2023-07-11 NOTE — PROGRESS NOTES
[place] Nava is a 73 year old female with a history of adductor laryngeal dystonia and laryngeal spasm.  she was last injected on 2/28/2023. she had a good response to the last injection. The last dose given was 0.25 units into each thyroarytenoid muscle.  After the injection  she had a breathy dysphonia for 1 week. There was no Dysphagia or Dyspnea.  The response lasted for 3-4 months.   Our plan is to give 0.25 units of Botulinum A toxin into  each thyroarytenoid muscle today.      PROCEDURE: After obtaining consent, the patient was placed in the supine position. Ground and reference electrodes were applied. The anterior neck was cleaned with an alcohol swab.  Using a 27-gauge, unipolar electromyography needle, the larynx was entered through the cricothyroid space.  0.25 units of botulinum A toxin was injected into each thyroarytenoid muscle.  There was a Strong EMG response to phonation on the left side and a Strong EMG response to phonation on the right side.  The total amount of botulinum A toxin delivered today was 0.5 units. An additional 5 units of botulinum A toxin was necessarily wasted in preparation for the injection. she tolerated the procedure well and left the clinic after a short observation period.         The EMG was necessary specifically in this case to identify areas of muscle overactivity as well as to access the thyroarytenoid muscle which is beneath the thyroid cartilage and is not otherwise directly accessible without EMG guidance.    PLAN: I will have her  follow up on a PRN basis. It is anticipated that repeat injections will be required over the long term.

## 2023-08-03 ENCOUNTER — TELEPHONE (OUTPATIENT)
Dept: OTOLARYNGOLOGY | Facility: CLINIC | Age: 73
End: 2023-08-03
Payer: COMMERCIAL

## 2023-08-03 NOTE — TELEPHONE ENCOUNTER
Left vm message for patient in regards to questions ans concerns regarding recent botox injection. Pt left writer a vm message stating she is experiencing a breathy side effect and is concerned. Writer shared vm message with provider as well, and was determined this is a normal side effect and will need to wear off. Pt may consider lowering her dose at next injection. Information was provided to pt on vm message. Writers call back number was provided on vm in case of further questions

## 2023-08-30 ENCOUNTER — OFFICE VISIT (OUTPATIENT)
Dept: CARDIOLOGY | Facility: CLINIC | Age: 73
End: 2023-08-30
Payer: COMMERCIAL

## 2023-08-30 VITALS
RESPIRATION RATE: 16 BRPM | BODY MASS INDEX: 24.38 KG/M2 | HEIGHT: 64 IN | WEIGHT: 142.8 LBS | SYSTOLIC BLOOD PRESSURE: 126 MMHG | DIASTOLIC BLOOD PRESSURE: 74 MMHG | HEART RATE: 69 BPM | OXYGEN SATURATION: 97 %

## 2023-08-30 DIAGNOSIS — E78.5 HYPERLIPIDEMIA LDL GOAL <100: ICD-10-CM

## 2023-08-30 DIAGNOSIS — I48.0 PAROXYSMAL ATRIAL FIBRILLATION (H): Primary | ICD-10-CM

## 2023-08-30 PROCEDURE — 99214 OFFICE O/P EST MOD 30 MIN: CPT | Performed by: INTERNAL MEDICINE

## 2023-08-30 NOTE — PROGRESS NOTES
HEART CARE ENCOUNTER CONSULTATON NOTE      [facility]  [phone]      Assessment/Recommendations   Assessment/Plan:  1.  Paroxysmal atrial fibrillation.  No symptoms to suggest recurrence of atrial fibrillation.  She is in sinus rhythm based on examination today.  She is status post ablation a number of years ago.  She did have moderate enlargement of left atrium and plan for repeat echocardiogram given history of left atrial enlargement and remote history of DVT and PE.    2.  Risk modification.  As noted a few years ago her calcium score was 0.  She had a carotid ultrasound that revealed mild plaque bilaterally per report.  Cholesterol numbers from April 2023 finds a cholesterol total of 219 with an LDL of 141.  Discussed these findings today.  Abdominal ultrasound of her abdomen in 2019 that was negative for aneurysm.  I discussed the consideration of statin lower her LDL at least less than 100.  We have had this discussion in the past and she continues to be reluctant.  She is aware of the issues and will continue to keep this in mind.  She has additional questions or concerns she will let me know.    Plan 1.  Echocardiogram  2.  Follow-up 1 year or sooner if specific issues were to arise.       History of Present Illness/Subjective    HPI: [place] Nava is a 73 year old female who is seen in follow-up.  We most recently visited May 2022.  She has a history of paroxysmal atrial fibrillation with ablation in January 2019 and has had no clinical recurrence of atrial fibrillation previously noted.  She has a history of DVT and pulmonary embolism and has been seen by hematology in the past who recommended long-term anticoagulation.  She did undergo coronary calcium scoring in December 2019 with a calcium score of 0.  I have reviewed recent notes from her primary care provider.    She has been feeling well.  She denies specific cardiovascular symptoms or complaints.  She tries to exercise  regularly.    Recent Echocardiogram Results:    Echocardiogram Complete  Order: [id number]  Status: Final result       Visible to patient: Yes (seen)       Next appt: 09/14/2023 at 12:45 PM in Cardiology ([facility] ECHO OP 3)       Dx: Paroxysmal atrial fibrillation (H)    1 Result Note  Details    Reading Physician Reading Date Result Priority   Bear Wheeler MD 8/10/2018 Routine   Provider, Historical 8/10/2018      Narrative & Impression    When compared to the previous study dated 8/30/2017, no significant change.    Left ventricle ejection fraction is normal. The calculated left ventricular ejection fraction is 61%.    Moderate left atrial enlargement    Normal right ventricular size and systolic function.    No hemodynamically significant valvular heart abnormalities     Recent Coronary Angiogram Results:    Narrative & Impression   EXAM: US CAROTID BILATERAL  LOCATION: [facility]  DATE/TIME: 4/12/2023 12:44 PM CDT     INDICATION: PAF (paroxysmal atrial fibrillation) (H), Other speech disturbances.  COMPARISON: None.  TECHNIQUE: Duplex exam performed utilizing 2D gray-scale imaging, Doppler interrogation with color-flow and spectral waveform analysis. The percent diameter stenosis is determined using NASCET criteria and Society of Radiologists in Ultrasound Consensus   Criteria.     FINDINGS:     RIGHT: Mild plaque at the bifurcation. The peak systolic velocity in the ICA is less than 125 cm/sec, consistent with less than 50% stenosis. Normal velocities in the ECA. Antegrade flow within the vertebral artery.      LEFT: Mild plaque at the bifurcation. The peak systolic velocity in the ICA is less than 125 cm/sec, consistent with less than 50% stenosis. Normal velocities in the ECA. Antegrade flow within the vertebral artery.     VELOCITY CHART:   The following velocities were obtained in the RIGHT carotid system.  CCA: 107/33 cm/sec  ICA: 105/50 cm/sec  ECA: 114/16 cm/sec  ICA/CCA: PS 1.0      The following velocities were obtained in the LEFT carotid system.  CCA: 104/31 cm/sec  ICA: 121/40 cm/sec  ECA: 103/22 cm/sec  ICA/CCA: PS 1.2                                                                      IMPRESSION:  1.  Mild plaque formation, velocities consistent with less than 50% stenosis in the right internal carotid artery.  2.  Mild plaque formation, velocities consistent with less than 50% stenosis in the left internal carotid artery.  3.  Flow within the vertebral arteries is antegrade.  4.  Incidental note is made of a large right thyroid nodule measuring 3.1 x 3.1 x 2.4 cm. A dedicated thyroid ultrasound is recommended.     Study Result    Narrative & Impression     The total Agatston calcium score is 0. A calcium score of zero places the individual in the lowest quartile when compared to an age and gender matched control group and implies a low risk of cardiac events in the next 10 years. (less than 1% per   year).      EXAM: US ABDOMINAL AORTA  LOCATION: [facility]  DATE/TIME: 12/11/2019 10:31 AM     INDICATION: Family history of ischemic heart disease and other diseases of the circulatory system  COMPARISON: None.  TECHNIQUE: Transverse and longitudinal images of the aorta. Color flow and spectral Doppler with waveform analysis.     FINDINGS: No abdominal aortic aneurysm.  There is minimal atheromatous plaque in the abdominal aorta.     Normal caliber aorta with maximal diameter of mid/distal aorta 1.7 cm  normal common iliac arteries bilaterally measuring 1.2 cm.     IMPRESSION:  1.  No abdominal aortic aneurysm.      DATE/TIME: 4/28/2023 9:20 AM CDT     INDICATION: Thyroid US to eval large right thyroid nodule noted on recent carotid US  COMPARISON: Carotid ultrasound 04/12/2023, CT chest 05/26/2017  TECHNIQUE: Thyroid ultrasound.      FINDINGS:  RIGHT lobe: 5.7 x 2.2 x 2.3 cm. Slightly heterogeneous echotexture. Solitary mid pole nodule.  Isthmus: 3 mm.  LEFT lobe: 4.9 x 1.6 x 1.4  cm. Heterogeneous echotexture. Subcentimeter cysts. No solid nodule.     NECK: No cervical lymphadenopathy.     NODULES:     Nodule 1: Right mid pole nodule measuring 3.2 x 2.8 x 1.8 cm   Composition: Solid or almost completely solid, 2 points   Echogenicity: Hyperechoic or isoechoic, 1 point   Shape: Wider-than-tall, 0 points   Margin: Smooth, 0 points   Echogenic Foci: None, or large comet-tail artifacts, 0 points   Point Total: 3 points. TI-RADS 3. If 2.5 cm or larger, recommend FNA; if 1.5 cm or larger, recommend follow up US at 1, 3, and 5 years.                                                                          IMPRESSION:  1.  Solitary right thyroid lobe 3.2 cm TI RADS 3 nodule. FNA is indicated per guidelines above.          Physical Examination  Review of Systems   Vitals: 126/74, weight 142 pounds, 69 and regular  Wt Readings from Last 3 Encounters:   07/11/23 63.5 kg (140 lb)   02/28/23 64 kg (141 lb)   05/11/22 64.4 kg (141 lb 14.4 oz)       General Appearance:   no distress, normal body habitus   ENT/Mouth: membranes moist, no oral lesions or bleeding gums.      EYES:  no scleral icterus, normal conjunctivae   Neck: no carotid bruits    Chest/Lungs:   lungs are clear to auscultation, no rales or wheezing, equal chest wall expansion    Cardiovascular:   Regular. Normal first and second heart sounds with no murmurs, rubs, or gallops; the carotid, radial and posterior tibial pulses are intact, Jugular venous pressure in normal limits, no edema bilaterally    Abdomen:  no  bruits, or tenderness; bowel sounds are present   Extremities: no cyanosis or clubbing   Skin: no xanthelasma, warm.    Neurologic: normal  bilateral, no tremors     Psychiatric: alert and oriented x3, calm        Please refer above for cardiac ROS details.        Medical History  Surgical History Family History Social History   Past Medical History:   Diagnosis Date    Atrial fibrillation (H)     Atrial fibrillation, persistent  (H) 05/2016    CV x2    Bunion of right foot 2014    Diverticulosis 1995    DVT of lower extremity (deep venous thrombosis) (H) 05/2017    rt leg from atrial fib    Essential tremor 1980    Hyperlipidemia 06/2010    Osteopenia 1998    PE (pulmonary thromboembolism) (H) 05/2017    dvt rt leg and PE rt lung    Skin cancer 06/2014    Varicose veins of both lower extremities with pain 1990     Past Surgical History:   Procedure Laterality Date    CARDIOVERSION  06/26/2017    sinus x 10 min, then back to afib    DILATION AND CURETTAGE      EP ABLATION PVI Left 1/9/2018    Procedure: EP Ablation PVI;  Surgeon: Monica Miller MD;  Location: [facility];  Service:     FOOT SURGERY      MOHS MICROGRAPHIC PROCEDURE       Family History   Problem Relation Age of Onset    Tremor Father     Cancer Sister     Hypertension Mother     Colon Cancer Sister     No Known Problems Father     Atrial fibrillation Brother     Pacemaker Brother     No Known Problems Sister     Prostate Cancer Brother         Social History     Socioeconomic History    Marital status: Single     Spouse name: Not on file    Number of children: Not on file    Years of education: Not on file    Highest education level: Not on file   Occupational History    Not on file   Tobacco Use    Smoking status: Never    Smokeless tobacco: Never   Substance and Sexual Activity    Alcohol use: Yes     Alcohol/week: 1.0 standard drink of alcohol     Comment: Alcoholic Drinks/day: occasional    Drug use: No    Sexual activity: Not on file     Comment: single    Other Topics Concern    Parent/sibling w/ CABG, MI or angioplasty before 65F 55M? Not Asked   Social History Narrative    Not on file     Social Determinants of Health     Financial Resource Strain: Not on file   Food Insecurity: Not on file   Transportation Needs: Not on file   Physical Activity: Not on file   Stress: Not on file   Social Connections: Not on file   Intimate Partner Violence: Not on  file   Housing Stability: Not on file           Medications  Allergies   Current Outpatient Medications   Medication Sig Dispense Refill    biotin 5 MG CAPS 1 capsule      Cholecalciferol (VITAMIN D3 PO) Take by mouth daily      clonazePAM (KLONOPIN) 0.5 MG tablet Take one tablet in the morning and 2 tablets at night 270 tablet 0    ELIQUIS 5 MG tablet Take 5 mg by mouth 2 times daily      Lactobacillus Rhamnosus, GG, ( PROBIOTIC DIGESTIVE CARE) CAPS Take 1 capsule by mouth daily      Magnesium 400 MG TABS Take 400 mg by mouth daily      melatonin 5 MG tablet Take 2.5 mg by mouth      metoprolol succinate ER (TOPROL-XL) 25 MG 24 hr tablet TAKE ONE TABLET BY MOUTH EVERY DAY      metoprolol tartrate (LOPRESSOR) 25 MG tablet       neomycin-polymyxin-dexamethasone (MAXITROL) 3.5-26163-8.1 ophthalmic ointment APPLY SMALL AMOUNT IN LEFT EYE TWICE DAILY      Omega-3 1000 MG capsule Take 1 g by mouth daily      prednisoLONE acetate (PRED FORTE) 1 % ophthalmic suspension       TURMERIC PO Take 1 capsule by mouth         Allergies   Allergen Reactions    Amiodarone      Tremors much worse on amiodarone          Lab Results    Chemistry/lipid CBC Cardiac Enzymes/BNP/TSH/INR   Recent Labs   Lab Test 04/24/23  1322   CHOL 219*   HDL 63   *   TRIG 75     Recent Labs   Lab Test 04/24/23  1322 08/26/21  0824 04/29/20  0839   * 150* 167*     Recent Labs   Lab Test 04/24/23  1322      POTASSIUM 4.0   CHLORIDE 100   CO2 22   GLC 87   BUN 21.5   CR 0.93   GFRESTIMATED 64   LAUREN 9.4     Recent Labs   Lab Test 04/24/23  1322 08/26/21  0824 04/29/20  0839   CR 0.93 0.83 0.84     No results for input(s): A1C in the last 61255 hours.       Recent Labs   Lab Test 01/03/18  1128   WBC 5.7   HGB 15.5   HCT 46.5   MCV 95        Recent Labs   Lab Test 01/03/18  1128   HGB 15.5    No results for input(s): TROPONINI in the last 52673 hours.  No results for input(s): BNP, NTBNPI, NTBNP in the last 85643 hours.  Recent  Labs   Lab Test 04/24/23  1358   TSH 0.49     No results for input(s): INR in the last 90397 hours.     Yung Smyth MD

## 2023-08-30 NOTE — PATIENT INSTRUCTIONS
We are going to plan a heart ultrasound and I will be in touch with results.My nurse is Mandie and her number is [phone]

## 2023-09-14 ENCOUNTER — HOSPITAL ENCOUNTER (OUTPATIENT)
Dept: CARDIOLOGY | Facility: CLINIC | Age: 73
Discharge: HOME | End: 2023-09-14
Attending: INTERNAL MEDICINE | Admitting: INTERNAL MEDICINE
Payer: COMMERCIAL

## 2023-09-14 DIAGNOSIS — I48.0 PAROXYSMAL ATRIAL FIBRILLATION (H): ICD-10-CM

## 2023-09-14 PROCEDURE — 93306 TTE W/DOPPLER COMPLETE: CPT

## 2023-09-14 PROCEDURE — 93306 TTE W/DOPPLER COMPLETE: CPT | Mod: 26 | Performed by: INTERNAL MEDICINE

## 2023-09-18 NOTE — RESULT ENCOUNTER NOTE
Echo shows normal lv function, no significant valve abnormality, biatrial enlargement, my chart note sent  mdg

## 2023-09-22 DIAGNOSIS — J38.3 OTHER DISEASES OF VOCAL CORDS: Primary | ICD-10-CM

## 2023-09-22 DIAGNOSIS — J38.5 LARYNGEAL SPASM: ICD-10-CM

## 2023-10-24 ENCOUNTER — OFFICE VISIT (OUTPATIENT)
Dept: OTOLARYNGOLOGY | Facility: CLINIC | Age: 73
End: 2023-10-24
Payer: COMMERCIAL

## 2023-10-24 VITALS — HEIGHT: 64 IN | WEIGHT: 135 LBS | BODY MASS INDEX: 23.05 KG/M2

## 2023-10-24 DIAGNOSIS — J38.5 LARYNGEAL SPASM: ICD-10-CM

## 2023-10-24 DIAGNOSIS — J38.3 OTHER DISEASES OF VOCAL CORDS: Primary | ICD-10-CM

## 2023-10-24 PROCEDURE — 64617 CHEMODENER MUSCLE LARYNX EMG: CPT | Mod: 50 | Performed by: OTOLARYNGOLOGY

## 2023-10-24 ASSESSMENT — PAIN SCALES - GENERAL: PAINLEVEL: NO PAIN (0)

## 2023-10-24 NOTE — PROGRESS NOTES
Florida Nava is a 73 year old female with a history of adductor laryngeal dystonia and laryngeal spasm.  she was last injected on 7/11/2023. she had a fair response to the last injection. The last dose given was 0.25 units into each thyroarytenoid muscle.  After the injection  she had a breathy dysphonia for 3-4 weeks.There was no Dysphagia or Dyspnea.  The response lasted for 4 months.  She is traveling soon and wants to avoid the prolonged breathiness.  We will therefore reduce her dose today.  Our plan is to give 0.13 units of Botulinum A toxin into  each thyroarytenoid muscle today.  This will be diluted to a 62.5 units/cc concentration and give 0.02 cc volume.      PROCEDURE: After obtaining consent, the patient was placed in the supine position. Ground and reference electrodes were applied. The anterior neck was cleaned with an alcohol swab.  Using a 27-gauge, unipolar electromyography needle, the larynx was entered through the cricothyroid space.  0.13 units of botulinum A toxin was injected into each thyroarytenoid muscle.  There was a Moderate EMG response to phonation on the left side and a Strong EMG response to phonation on the right side.  The total amount of botulinum A toxin delivered today was 0.26 units. An additional 5 units of botulinum A toxin was necessarily wasted in preparation for the injection. she tolerated the procedure well and left the clinic after a short observation period.         The EMG was necessary specifically in this case to identify areas of muscle overactivity as well as to access the thyroarytenoid muscle which is beneath the thyroid cartilage and is not otherwise directly accessible without EMG guidance.    PLAN: I will have her  follow up on a PRN basis. It is anticipated that repeat injections will be required over the long term.       yes

## 2023-10-24 NOTE — LETTER
10/24/2023       RE: Florida Nava  [address] [place] [address]     Dear Colleague,    Thank you for referring your patient, [place] Nava, to the [facility] [place] at [facility]. Please see a copy of my visit note below.    Florida Nava is a 73 year old female with a history of adductor laryngeal dystonia and laryngeal spasm.  she was last injected on 7/11/2023. she had a fair response to the last injection. The last dose given was 0.25 units into each thyroarytenoid muscle.  After the injection  she had a breathy dysphonia for 3-4 weeks.There was no Dysphagia or Dyspnea.  The response lasted for 4 months.  She is traveling soon and wants to avoid the prolonged breathiness.  We will therefore reduce her dose today.  Our plan is to give 0.13 units of Botulinum A toxin into  each thyroarytenoid muscle today.  This will be diluted to a 62.5 units/cc concentration and give 0.02 cc volume.      PROCEDURE: After obtaining consent, the patient was placed in the supine position. Ground and reference electrodes were applied. The anterior neck was cleaned with an alcohol swab.  Using a 27-gauge, unipolar electromyography needle, the larynx was entered through the cricothyroid space.  0.13 units of botulinum A toxin was injected into each thyroarytenoid muscle.  There was a Moderate EMG response to phonation on the left side and a Strong EMG response to phonation on the right side.  The total amount of botulinum A toxin delivered today was 0.26 units. An additional 5 units of botulinum A toxin was necessarily wasted in preparation for the injection. she tolerated the procedure well and left the clinic after a short observation period.         The EMG was necessary specifically in this case to identify areas of muscle overactivity as well as to access the thyroarytenoid muscle which is beneath the thyroid cartilage and is not  otherwise directly accessible without EMG guidance.    PLAN: I will have her  follow up on a PRN basis. It is anticipated that repeat injections will be required over the long term.        Again, thank you for allowing me to participate in the care of your patient.      Sincerely,    Jose D Pugh MD

## 2023-10-24 NOTE — NURSING NOTE
Invasive Procedure Safety Checklist  Procedure:  Botox Injection    Responsible person(s):  Complete sections as appropriate and electronically sign and date below.    Staff/Provider  Consent documentation on chart:  YES  H&P is not applicable (when straight local anesthesia is used).    Procedure Team  Completed by comparing informed consent documentation, information on the patient record and/or the marked surgical site, and discussion with the patient/guardian.     Verified:  (Select all that apply)  Patient identification (two indicators)  Procedure to be performed  Procedure site and /or laterality and/or level  Consent  Procedure site:  Site can not be marked due to location.  Provider Daniels - Site/Laterality/Level:  No Level or Structure  Staff/Provider:  No images    Procedure Team:  *Pause for the Cause* verbal and active participation of team members- verify:  Patient name:  YES  Procedure to be performed:  YES  Site, laterality and level, noting patient position:  YES    Above steps completed as applicable (Electronic Signature, Title, Date):    Lynda Pennington RN on 10/24/2023 at 10:56 AM      Note:  Any incidents of wrong patient, wrong procedure, or wrong site are reported using the Occurrence Process already in place.  The occurrence form is required to be completed immediately with this type of event.

## 2024-02-20 ENCOUNTER — OFFICE VISIT (OUTPATIENT)
Dept: OTOLARYNGOLOGY | Facility: CLINIC | Age: 74
End: 2024-02-20
Payer: COMMERCIAL

## 2024-02-20 VITALS — BODY MASS INDEX: 26.5 KG/M2 | WEIGHT: 135 LBS | HEIGHT: 60 IN

## 2024-02-20 DIAGNOSIS — J38.3 OTHER DISEASES OF VOCAL CORDS: Primary | ICD-10-CM

## 2024-02-20 DIAGNOSIS — J38.5 LARYNGEAL SPASM: ICD-10-CM

## 2024-02-20 PROCEDURE — 64617 CHEMODENER MUSCLE LARYNX EMG: CPT | Mod: 50 | Performed by: OTOLARYNGOLOGY

## 2024-02-20 NOTE — LETTER
2/20/2024       RE: Florida Nava  [address] [place] [address]     Dear Colleague,    Thank you for referring your patient, [place] Nava, to the [facility] [place] at [facility]. Please see a copy of my visit note below.    Florida Nava is a 73 year old female with a history of adductor laryngeal dystonia and laryngeal spasm.  she was last injected on 10/24/2023. she had a good response to the last injection. The last dose given was 0.13 units of Botulinum A toxin into  each thyroarytenoid muscle .  This was a reduction in her dose from 7/11/2023.   After the injection  she had a breathy dysphonia for 1 week. There was no Dysphagia or Dyspnea.  The response lasted for 4 months.   Our plan is to give 0.13 units of Botulinum A toxin into  each thyroarytenoid muscle today.  This will be diluted to a 6.25 units/cc concentration and give 0.02 cc volume.       PROCEDURE: After obtaining consent, the patient was placed in the supine position. Ground and reference electrodes were applied. The anterior neck was cleaned with an alcohol swab.  Using a 27-gauge, unipolar electromyography needle, the larynx was entered through the cricothyroid space.  0.13 units of botulinum A toxin was injected into each thyroarytenoid muscle.  There was a Strong EMG response to phonation on the left side and a Strong EMG response to phonation on the right side.  The total amount of botulinum A toxin delivered today was 0.26 units. An additional 5 units of botulinum A toxin was necessarily wasted in preparation for the injection. she tolerated the procedure well and left the clinic after a short observation period.         The EMG was necessary specifically in this case to identify areas of muscle overactivity as well as to access the thyroarytenoid muscle which is beneath the thyroid cartilage and is not otherwise directly accessible without EMG  guidance.    PLAN: I will have her  follow up on a PRN basis. It is anticipated that repeat injections will be required over the long term.        Again, thank you for allowing me to participate in the care of your patient.      Sincerely,    Jose D Pugh MD

## 2024-02-20 NOTE — PROGRESS NOTES
[place] Nava is a 73 year old female with a history of adductor laryngeal dystonia and laryngeal spasm.  she was last injected on 10/24/2023. she had a good response to the last injection. The last dose given was 0.13 units of Botulinum A toxin into  each thyroarytenoid muscle .  This was a reduction in her dose from 7/11/2023.   After the injection  she had a breathy dysphonia for 1 week. There was no Dysphagia or Dyspnea.  The response lasted for 4 months.   Our plan is to give 0.13 units of Botulinum A toxin into  each thyroarytenoid muscle today.  This will be diluted to a 6.25 units/cc concentration and give 0.02 cc volume.       PROCEDURE: After obtaining consent, the patient was placed in the supine position. Ground and reference electrodes were applied. The anterior neck was cleaned with an alcohol swab.  Using a 27-gauge, unipolar electromyography needle, the larynx was entered through the cricothyroid space.  0.13 units of botulinum A toxin was injected into each thyroarytenoid muscle.  There was a Strong EMG response to phonation on the left side and a Strong EMG response to phonation on the right side.  The total amount of botulinum A toxin delivered today was 0.26 units. An additional 5 units of botulinum A toxin was necessarily wasted in preparation for the injection. she tolerated the procedure well and left the clinic after a short observation period.         The EMG was necessary specifically in this case to identify areas of muscle overactivity as well as to access the thyroarytenoid muscle which is beneath the thyroid cartilage and is not otherwise directly accessible without EMG guidance.    PLAN: I will have her  follow up on a PRN basis. It is anticipated that repeat injections will be required over the long term.

## 2024-02-20 NOTE — PATIENT INSTRUCTIONS
1.  You were seen in the ENT Clinic today by . If you have any questions or concerns after your appointment, please call [phone]. Press option #1 for scheduling related needs. Press option #3 for Nurse advice.    2. Plan is to return to clinic 6/18/24 for repeat botox injection      Savita Loredo LPN  [phone]  [facility] - Otolaryngology

## 2024-05-10 ENCOUNTER — TRANSFERRED RECORDS (OUTPATIENT)
Dept: HEALTH INFORMATION MANAGEMENT | Facility: CLINIC | Age: 74
End: 2024-05-10

## 2024-05-10 ENCOUNTER — LAB REQUISITION (OUTPATIENT)
Dept: LAB | Facility: CLINIC | Age: 74
End: 2024-05-10
Payer: COMMERCIAL

## 2024-05-10 DIAGNOSIS — M85.80 OTHER SPECIFIED DISORDERS OF BONE DENSITY AND STRUCTURE, UNSPECIFIED SITE: ICD-10-CM

## 2024-05-10 DIAGNOSIS — E04.1 NONTOXIC SINGLE THYROID NODULE: ICD-10-CM

## 2024-05-10 DIAGNOSIS — I48.91 UNSPECIFIED ATRIAL FIBRILLATION (H): ICD-10-CM

## 2024-05-10 DIAGNOSIS — E78.00 PURE HYPERCHOLESTEROLEMIA, UNSPECIFIED: ICD-10-CM

## 2024-05-10 PROCEDURE — 80061 LIPID PANEL: CPT | Mod: ORL | Performed by: FAMILY MEDICINE

## 2024-05-10 PROCEDURE — 84443 ASSAY THYROID STIM HORMONE: CPT | Mod: ORL | Performed by: FAMILY MEDICINE

## 2024-05-10 PROCEDURE — 82306 VITAMIN D 25 HYDROXY: CPT | Mod: ORL | Performed by: FAMILY MEDICINE

## 2024-05-10 PROCEDURE — 80048 BASIC METABOLIC PNL TOTAL CA: CPT | Mod: ORL | Performed by: FAMILY MEDICINE

## 2024-05-11 LAB
ANION GAP SERPL CALCULATED.3IONS-SCNC: 12 MMOL/L (ref 7–15)
BUN SERPL-MCNC: 23.4 MG/DL (ref 8–23)
CALCIUM SERPL-MCNC: 9.3 MG/DL (ref 8.8–10.2)
CHLORIDE SERPL-SCNC: 103 MMOL/L (ref 98–107)
CHOLEST SERPL-MCNC: 227 MG/DL
CREAT SERPL-MCNC: 0.87 MG/DL (ref 0.51–0.95)
DEPRECATED HCO3 PLAS-SCNC: 25 MMOL/L (ref 22–29)
EGFRCR SERPLBLD CKD-EPI 2021: 69 ML/MIN/1.73M2
FASTING STATUS PATIENT QL REPORTED: ABNORMAL
FASTING STATUS PATIENT QL REPORTED: ABNORMAL
GLUCOSE SERPL-MCNC: 95 MG/DL (ref 70–99)
HDLC SERPL-MCNC: 63 MG/DL
LDLC SERPL CALC-MCNC: 146 MG/DL
NONHDLC SERPL-MCNC: 164 MG/DL
POTASSIUM SERPL-SCNC: 4.1 MMOL/L (ref 3.4–5.3)
SODIUM SERPL-SCNC: 140 MMOL/L (ref 135–145)
TRIGL SERPL-MCNC: 90 MG/DL
TSH SERPL DL<=0.005 MIU/L-ACNC: 0.55 UIU/ML (ref 0.3–4.2)
VIT D+METAB SERPL-MCNC: 51 NG/ML (ref 20–50)

## 2024-05-21 ENCOUNTER — HOSPITAL ENCOUNTER (OUTPATIENT)
Dept: MAMMOGRAPHY | Facility: CLINIC | Age: 74
Discharge: HOME | End: 2024-05-21
Attending: FAMILY MEDICINE | Admitting: FAMILY MEDICINE
Payer: COMMERCIAL

## 2024-05-21 DIAGNOSIS — Z12.31 VISIT FOR SCREENING MAMMOGRAM: ICD-10-CM

## 2024-05-21 PROCEDURE — 77063 BREAST TOMOSYNTHESIS BI: CPT

## 2024-05-22 DIAGNOSIS — J38.5 LARYNGEAL SPASM: Primary | ICD-10-CM

## 2024-05-24 NOTE — TELEPHONE ENCOUNTER
Patient:  JULIETA LITTLE    MRN:  [id number]    Chanel Request ID:  [id number]    Level of care reserved:  Home Health Agency    [facility] [place] [address] [phone]    Clinical needs requested:    Geography searched:  [id number]    Start of Service:    Request sent:  12:08pm EDT on 5/24/2024 by Polly Roman    Partner reserved:  12:20pm EDT on 5/24/2024 by Polly Roman    Choice list shared:  12:20pm EDT on 5/24/2024 by Polly Roman   Pt. Informed refill proposal has been sent to Dr. Lewis ( in [employer] chart ) for review.   Carrie Montilla on 6/18/2020 at 1:49 PM

## 2024-06-18 ENCOUNTER — OFFICE VISIT (OUTPATIENT)
Dept: OTOLARYNGOLOGY | Facility: CLINIC | Age: 74
End: 2024-06-18
Payer: COMMERCIAL

## 2024-06-18 VITALS — BODY MASS INDEX: 26.5 KG/M2 | WEIGHT: 135 LBS | HEIGHT: 60 IN

## 2024-06-18 DIAGNOSIS — J38.3 OTHER DISEASES OF VOCAL CORDS: ICD-10-CM

## 2024-06-18 DIAGNOSIS — J38.5 LARYNGEAL SPASM: Primary | ICD-10-CM

## 2024-06-18 PROCEDURE — 64617 CHEMODENER MUSCLE LARYNX EMG: CPT | Mod: 50 | Performed by: OTOLARYNGOLOGY

## 2024-06-18 NOTE — LETTER
6/18/2024       RE: [place] Nava  [address] [place] [address]     Dear Colleague,    Thank you for referring your patient, [place] Nava, to the [facility] EAR NOSE AND THROAT CLINIC [place] at [facility]. Please see a copy of my visit note below.    [place] Nava is a 74 year old female with a history of adductor laryngeal dystonia and laryngeal spasm.  she was last injected on 2/20/2024. she had a good response to the last injection. The last dose given was 0.13 units into each thyroarytenoid muscle.  After the injection  she had a breathy dysphonia for 0 weeks.There was no Dysphagia or Dyspnea.  The response lasted for 3.5 months.   Our plan is to give 0.13 units of Botulinum A toxin into  each thyroarytenoid muscle today.This will be diluted to a 6.25 units/cc concentration and give 0.02 cc volume.       PROCEDURE: After obtaining consent, the patient was placed in the supine position. Ground and reference electrodes were applied. The anterior neck was cleaned with an alcohol swab.  Using a 27-gauge, unipolar electromyography needle, the larynx was entered through the cricothyroid space.  0.13 units of botulinum A toxin was injected into each thyroarytenoid muscle.  There was a Strong EMG response to phonation on the left side and a Strong EMG response to phonation on the right side.  The total amount of botulinum A toxin delivered today was 0.26 units. An additional 99.49 units of botulinum A toxin was necessarily wasted in preparation for the injection. she tolerated the procedure well and left the clinic after a short observation period.         The EMG was necessary specifically in this case to identify areas of muscle overactivity as well as to access the thyroarytenoid muscle which is beneath the thyroid cartilage and is not otherwise directly accessible without EMG guidance.    PLAN: I will have her  follow up on a PRN basis. It is  anticipated that repeat injections will be required over the long term.    Sincerely,    Jose D Pugh MD

## 2024-06-18 NOTE — PROGRESS NOTES
[place] Nava is a 74 year old female with a history of adductor laryngeal dystonia and laryngeal spasm.  she was last injected on 2/20/2024. she had a good response to the last injection. The last dose given was 0.13 units into each thyroarytenoid muscle.  After the injection  she had a breathy dysphonia for 0 weeks.There was no Dysphagia or Dyspnea.  The response lasted for 3.5 months.   Our plan is to give 0.13 units of Botulinum A toxin into  each thyroarytenoid muscle today.This will be diluted to a 6.25 units/cc concentration and give 0.02 cc volume.       PROCEDURE: After obtaining consent, the patient was placed in the supine position. Ground and reference electrodes were applied. The anterior neck was cleaned with an alcohol swab.  Using a 27-gauge, unipolar electromyography needle, the larynx was entered through the cricothyroid space.  0.13 units of botulinum A toxin was injected into each thyroarytenoid muscle.  There was a Strong EMG response to phonation on the left side and a Strong EMG response to phonation on the right side.  The total amount of botulinum A toxin delivered today was 0.26 units. An additional 99.49 units of botulinum A toxin was necessarily wasted in preparation for the injection. she tolerated the procedure well and left the clinic after a short observation period.         The EMG was necessary specifically in this case to identify areas of muscle overactivity as well as to access the thyroarytenoid muscle which is beneath the thyroid cartilage and is not otherwise directly accessible without EMG guidance.    PLAN: I will have her  follow up on a PRN basis. It is anticipated that repeat injections will be required over the long term.

## 2024-06-18 NOTE — PATIENT INSTRUCTIONS
1.  You were seen in the ENT Clinic today by . If you have any questions or concerns after your appointment, please call [phone]. Press option #1 for scheduling related needs. Press option #3 for Nurse advice.    2. Plan is to return to clinic 9/17/24 for repeat botox injection      Savita Loredo LPN  [phone]  [facility] - Otolaryngology

## 2024-07-14 ENCOUNTER — HEALTH MAINTENANCE LETTER (OUTPATIENT)
Age: 74
End: 2024-07-14

## 2024-08-20 ENCOUNTER — OFFICE VISIT (OUTPATIENT)
Dept: OTOLARYNGOLOGY | Facility: CLINIC | Age: 74
End: 2024-08-20
Payer: COMMERCIAL

## 2024-08-20 VITALS — WEIGHT: 135 LBS | BODY MASS INDEX: 26.5 KG/M2 | HEIGHT: 60 IN

## 2024-08-20 DIAGNOSIS — J38.5 LARYNGEAL SPASM: Primary | ICD-10-CM

## 2024-08-20 DIAGNOSIS — J38.3 OTHER DISEASES OF VOCAL CORDS: ICD-10-CM

## 2024-08-20 PROCEDURE — 64617 CHEMODENER MUSCLE LARYNX EMG: CPT | Mod: 50 | Performed by: OTOLARYNGOLOGY

## 2024-08-20 NOTE — LETTER
8/20/2024       RE: Flroida Nava  [address] [place] [address]     Dear Colleague,    Thank you for referring your patient, [place] Nava, to the [facility] EAR NOSE AND THROAT CLINIC [place] at [facility]. Please see a copy of my visit note below.    [place] Nava is a 74 year old female with a history of adductor laryngeal dystonia and laryngeal spasm.  she was last injected on 6/18/2024. she had a fair to good response to the last injection. The last dose given was 0.13 units into each thyroarytenoid muscle.  After the injection  she had a breathy dysphonia for 0.5 weeks.There was no Dysphagia or Dyspnea.  The response lasted for 2 months.  Because she has done well with this dosage in the past and does not want excessive breathiness we will keep her at the same dosage level.  Our plan is to give 0.13 units of Botulinum A toxin into  each thyroarytenoid muscle today.This will be diluted to a 6.25 units/cc concentration and give 0.02 cc volume.       PROCEDURE: After obtaining consent, the patient was placed in the supine position. Ground and reference electrodes were applied. The anterior neck was cleaned with an alcohol swab.  Using a 27-gauge, unipolar electromyography needle, the larynx was entered through the cricothyroid space.  0.13 units of botulinum A toxin was injected into each thyroarytenoid muscle.  There was a Strong EMG response to phonation on the left side and a Strong EMG response to phonation on the right side.  The total amount of botulinum A toxin delivered today was 0.26 units. An additional 99.74 units of botulinum A toxin was necessarily wasted in preparation for the injection. she tolerated the procedure well and left the clinic after a short observation period.         The EMG was necessary specifically in this case to identify areas of muscle overactivity as well as to access the thyroarytenoid muscle which is beneath  the thyroid cartilage and is not otherwise directly accessible without EMG guidance.    PLAN: I will have her  follow up on a PRN basis. It is anticipated that repeat injections will be required over the long term.        Again, thank you for allowing me to participate in the care of your patient.      Sincerely,    Jose D Pugh MD

## 2024-08-20 NOTE — PATIENT INSTRUCTIONS
1.  You were seen in the ENT Clinic today by . If you have any questions or concerns after your appointment, please call [phone]. Press option #1 for scheduling related needs. Press option #3 for Nurse advice.     2. Plan is to return to clinic 10/22/2024 at 9:15 am for repeat botox injection.        Savita Loredo LPN  [phone]  [facility] - Otolaryngology

## 2024-08-20 NOTE — NURSING NOTE
Invasive Procedure Safety Checklist  Procedure:  Botox    Responsible person(s):  Complete sections as appropriate and electronically sign and date below.    Staff/Provider  Consent documentation on chart:  YES  H&P is not applicable (when straight local anesthesia is used).    Procedure Team  Completed by comparing informed consent documentation, information on the patient record and/or the marked surgical site, and discussion with the patient/guardian.     Verified:  (Select all that apply)  Patient identification (two indicators)  Procedure to be performed  Procedure site and /or laterality and/or level  Consent  Procedure site:  Site can not be marked due to location.  Provider Daniels - Site/Laterality/Level:  No Level or Structure  Staff/Provider:  No images    Procedure Team:  *Pause for the Cause* verbal and active participation of team members- verify:  Patient name:  YES  Procedure to be performed:  YES  Site, laterality and level, noting patient position:  YES    Above steps completed as applicable (Electronic Signature, Title, Date):    VICKEY HERNÁNDEZ LPN on 8/20/2024 at 1:14 PM      Note:  Any incidents of wrong patient, wrong procedure, or wrong site are reported using the Occurrence Process already in place.  The occurrence form is required to be completed immediately with this type of event.

## 2024-08-20 NOTE — PROGRESS NOTES
[place] Nava is a 74 year old female with a history of adductor laryngeal dystonia and laryngeal spasm.  she was last injected on 6/18/2024. she had a fair to good response to the last injection. The last dose given was 0.13 units into each thyroarytenoid muscle.  After the injection  she had a breathy dysphonia for 0.5 weeks.There was no Dysphagia or Dyspnea.  The response lasted for 2 months.  Because she has done well with this dosage in the past and does not want excessive breathiness we will keep her at the same dosage level.  Our plan is to give 0.13 units of Botulinum A toxin into  each thyroarytenoid muscle today.This will be diluted to a 6.25 units/cc concentration and give 0.02 cc volume.       PROCEDURE: After obtaining consent, the patient was placed in the supine position. Ground and reference electrodes were applied. The anterior neck was cleaned with an alcohol swab.  Using a 27-gauge, unipolar electromyography needle, the larynx was entered through the cricothyroid space.  0.13 units of botulinum A toxin was injected into each thyroarytenoid muscle.  There was a Strong EMG response to phonation on the left side and a Strong EMG response to phonation on the right side.  The total amount of botulinum A toxin delivered today was 0.26 units. An additional 99.74 units of botulinum A toxin was necessarily wasted in preparation for the injection. she tolerated the procedure well and left the clinic after a short observation period.         The EMG was necessary specifically in this case to identify areas of muscle overactivity as well as to access the thyroarytenoid muscle which is beneath the thyroid cartilage and is not otherwise directly accessible without EMG guidance.    PLAN: I will have her  follow up on a PRN basis. It is anticipated that repeat injections will be required over the long term.

## 2024-10-22 ENCOUNTER — OFFICE VISIT (OUTPATIENT)
Dept: OTOLARYNGOLOGY | Facility: CLINIC | Age: 74
End: 2024-10-22
Payer: COMMERCIAL

## 2024-10-22 DIAGNOSIS — J38.3 OTHER DISEASES OF VOCAL CORDS: ICD-10-CM

## 2024-10-22 DIAGNOSIS — J38.5 LARYNGEAL SPASM: Primary | ICD-10-CM

## 2024-10-22 PROCEDURE — 64617 CHEMODENER MUSCLE LARYNX EMG: CPT | Mod: 50 | Performed by: OTOLARYNGOLOGY

## 2024-10-22 NOTE — NURSING NOTE
Invasive Procedure Safety Checklist  Procedure:  Botox    Responsible person(s):  Complete sections as appropriate and electronically sign and date below.    Staff/Provider  Consent documentation on chart:  YES  H&P is not applicable (when straight local anesthesia is used).    Procedure Team  Completed by comparing informed consent documentation, information on the patient record and/or the marked surgical site, and discussion with the patient/guardian.     Verified:  (Select all that apply)  Patient identification (two indicators)  Procedure to be performed  Procedure site and /or laterality and/or level  Consent  Procedure site:  Site can not be marked due to location.  Provider Daniels - Site/Laterality/Level:  No Level or Structure  Staff/Provider:  No images    Procedure Team:  *Pause for the Cause* verbal and active participation of team members- verify:  Patient name:  YES  Procedure to be performed:  YES  Site, laterality and level, noting patient position:  YES    Above steps completed as applicable (Electronic Signature, Title, Date):    VICKEY HERNÁNDEZ LPN on 10/22/2024 at 11:06 AM      Note:  Any incidents of wrong patient, wrong procedure, or wrong site are reported using the Occurrence Process already in place.  The occurrence form is required to be completed immediately with this type of event.

## 2024-10-22 NOTE — PATIENT INSTRUCTIONS
Laryngopharyngeal Reflux Disorder (LPRD)     Reflux is a term that describes stomach contents that back up above the stomach. The stomach contents range depending on what is in the stomach at the time of this happening from very acidic to non-acidic. When the stomach contents backup happens to the level of the esophagus - this can cause indigestion, stomach upset, pain, heartburn, regurgitation. This is known as Gastroesophageal Reflux Disorder (GERD). When the stomach contents back up even higher to your throat - to the level of the entrance of your esophagus and your voice box or larynx - this is called extraesophageal reflux - atypical reflux or laryngopharyngeal reflux and can cause what is known as Laryngopharyngeal Reflux Disorder (LPRD).      Gastroesophageal Reflux Disorder (GERD) is a well-known problem in this country, with advertisements for the many medications used to treat it commonly seen on TV.  GERD occurs when stomach acid makes its way back up the esophagus, causing indigestion, stomach upset, and, especially, heartburn.  The acid may travel all the way up the esophagus, and spill over onto the larynx.  This is especially common during sleep, when the individual is lying down, and acid does not have to fight gravity to move up the esophagus.  When symptoms of acid reflux are more apparent in the larynx or pharynx, then the disorder may be called Laryngopharyngeal Reflux Disorder (LPRD).    Some of the symptoms include:     a dry, choking sensation, especially during the night  voice quality that is worst in the morning  a raw, burning sensation in the throat  pain in the throat, neck, or running from the back of the chin along the neck  frequent coughing, or desire to clear the throat, or mucus sensation  a rough, gritty voice quality  decline in voice quality, or comfort, with continued voice use  a sour taste in your mouth upon waking up  a lump in the throat or globus sensation   postnasal  drip, without other nasal and allergic symptoms   sensation of difficulty inhaling - like paradoxical vocal fold motion or laryngospasm      Interestingly, many of the patients in the [facility] who have laryngeal symptoms (LPRD) do not have classic symptoms of GERD, such as stomach discomfort and heartburn.     Why does LPRD cause throat symptoms:   In the case of LPRD, when the stomach content spills over onto the larynx, it irritates the lining of the throat and the vocal folds and creates inflammation, which causes the vocal folds to vibrate unevenly.  Coughing and throat-clearing from the irritation can make the inflammation worse.  The resulting voice disorder is often related to the poor vibratory quality of the inflamed vocal folds and the muscle tension created by effortful attempts at compensation.     Anti-reflux medication and dietary precautions are the first line of treatment for GERD/LPRD.  Discuss medications with your doctor.  Some medications may have side effects if taken for a long time, and it will be important for you to consider these.  Functional voice therapy is useful to teach techniques for reducing effortful compensation and instruct the individual in improved laryngeal hygiene.     At the [facility], we do not hand out a list of foods and beverages that must be avoided.  Rather, we educate about types of foods and beverages known to cause reflux, and we will encourage you to systematically investigate which foods stimulate your own reflux.  See the back of this page for dietary and lifestyle precautions for GERD/LPRD.  Also, you are encouraged to manage reflux under the care of a gastrointestinal specialist.     Lifestyle changes that may help reduce symptoms of GERD/LPRD  eat smaller meals more frequently throughout the day, rather than three large meals  elevate the head of your bed 2-3 inches (don't just use extra pillows for your head)  avoid clothes that fit tightly  around the waist  avoid exercising or lying down within 2-3 hours of eating (don't eat dinner late at night)  Types of foods known to trigger increased stomach acid  spicy food (such as chili or jalapeño peppers, Georgian or Szechuan spices)  acidic foods such as tomato products or citrus products  greasy foods  caffeine  alcohol  carbonation  roughage, such as popcorn and peanuts, or raw vegetables  dairy products   strong mint such as peppermint candies  chocolate     IMPORTANT NOTE!  Before you read the information in the box and think you'll only be able to eat bread and oatmeal for the rest of your life, remember that reflux varies person to person.  One person may not have problems with coffee but is bothered by red wine, while another person may not be affected by red wine but can't have tomato sauce.  If you choose to restrict with your diet, try changing things one group at a time to see what makes the difference for you.       Regarding the use of anti-reflux medications, one of the most common varieties is called a Proton Pump Inhibitor or PPI.  This class of medication includes brand name drugs like Prilosec and its generic form Omeprazole.  For most PPI to get the best benefit from this type of medication it should be taken on an empty stomach 30-60 minutes before you eat.  As with any medication, speak to your pharmacist. Y if you want to be sure you are taking it correctly, and that it does not interact with any of your home medications - especially antiplatelet medication (like plavix) or thyroid replacement medication (like synthroid). ( speak to your pharmacist)         List of Food Items for the Low-Acid Diet:  Agave  Aloe vera   Apple  Artifical sweetener (max 2 tsp per day)  Avocado   Bagels and (non-fruit) low-fat muffins  Banana  Beans (black, red, lima, lentils, etc)  Bread (especially whole grain and rye)  Caramel (maximum 4 tablespoons per work)  Celery  Chamomile tea (most other herbal teas  are not acceptable)  Chicken (grilled, broiled, baked, or steamed; no skin)  Chicken stock or bouillon Coffee (max 1 cup per day; best with milk)  Egg whites  Fennel  Fish (grilled, broiled, baked, or steamed)  Sarah (sarah root, powdered, or preserved)  Luis Alberto crackers  Herbs (excluding all peppers, citrus, garlic, and mustard)  Honey  Melon (honeydew, cantaloupe, watermelon)  Mushrooms (raw or cooked)  Oatmeal (all whole-grain cereals)  Olive oil (maximum 2 tablespoons per day)  Parsley  Pasta (with nonacidic sauce)  Popcorn (plain or salted, no butter)  Potatoes (all of the root vegetables except onions)  Rice (healthy, especially brown rice, a staple during induction)  Skim milk (soy or Lactaid skim milk)  Soups (great homemade with noodles and vegetables)  Tofu  Turkey breast (organic, no skin)  Turnip  Vegetables (raw or cooked, but no onion, tomato, or peppers)  Vinaigrette (maximum 1 tablespoon per day; toss salads)  Whole-grain breads, crackers, and breakfast cereals      1.  You were seen in the ENT Clinic today by . If you have any questions or concerns after your appointment, please call [phone]. Press option #1 for scheduling related needs. Press option #3 for Nurse advice.     2. Plan is to return to clinic 2/4/2025 at 9:45 am for repeat botox injection.        Savita Loredo LPN  [phone]  [facility] - Otolaryngology

## 2024-10-22 NOTE — LETTER
10/22/2024       RE: Florida Nava  [address] [place] [address]     Dear Colleague,    Thank you for referring your patient, [place] Nava, to the [facility] EAR NOSE AND THROAT CLINIC [place] at [facility]. Please see a copy of my visit note below.    [place] Nava is a 74 year old female with a history of adductor laryngeal dystonia and laryngeal spasm.  she was last injected on 8/20/2024. she had a good response to the last injection. The last dose given was 0.13 units into each thyroarytenoid muscle.  After the injection  she had a breathy dysphonia for 0 weeks.There was no Dysphagia or Dyspnea.  The response lasted for 2 months. Our plan is to give 0.13 units of Botulinum A toxin into  each thyroarytenoid muscle today.This will be diluted to a 6.25 units/cc concentration and give 0.02 cc volume.         PROCEDURE: After obtaining consent, the patient was placed in the supine position. Ground and reference electrodes were applied. The anterior neck was cleaned with an alcohol swab.  Using a 27-gauge, unipolar electromyography needle, the larynx was entered through the cricothyroid space.  0.13 units of botulinum A toxin was injected into each thyroarytenoid muscle.  There was a Strong EMG response to phonation on the left side and a Strong EMG response to phonation on the right side.  The total amount of botulinum A toxin delivered today was 0.26 units. An additional 99.74 units of botulinum A toxin was necessarily wasted in preparation for the injection. she tolerated the procedure well and left the clinic after a short observation period.         The EMG was necessary specifically in this case to identify areas of muscle overactivity as well as to access the thyroarytenoid muscle which is beneath the thyroid cartilage and is not otherwise directly accessible without EMG guidance.    PLAN: I will have her  follow up on a PRN basis. It is  anticipated that repeat injections will be required over the long term.    Again, thank you for allowing me to participate in the care of your patient.    Sincerely,  Jose D Pugh MD

## 2024-10-22 NOTE — PROGRESS NOTES
[place] Nava is a 74 year old female with a history of adductor laryngeal dystonia and laryngeal spasm.  she was last injected on 8/20/2024. she had a good response to the last injection. The last dose given was 0.13 units into each thyroarytenoid muscle.  After the injection  she had a breathy dysphonia for 0 weeks.There was no Dysphagia or Dyspnea.  The response lasted for 2 months. Our plan is to give 0.13 units of Botulinum A toxin into  each thyroarytenoid muscle today.This will be diluted to a 6.25 units/cc concentration and give 0.02 cc volume.         PROCEDURE: After obtaining consent, the patient was placed in the supine position. Ground and reference electrodes were applied. The anterior neck was cleaned with an alcohol swab.  Using a 27-gauge, unipolar electromyography needle, the larynx was entered through the cricothyroid space.  0.13 units of botulinum A toxin was injected into each thyroarytenoid muscle.  There was a Strong EMG response to phonation on the left side and a Strong EMG response to phonation on the right side.  The total amount of botulinum A toxin delivered today was 0.26 units. An additional 99.74 units of botulinum A toxin was necessarily wasted in preparation for the injection. she tolerated the procedure well and left the clinic after a short observation period.         The EMG was necessary specifically in this case to identify areas of muscle overactivity as well as to access the thyroarytenoid muscle which is beneath the thyroid cartilage and is not otherwise directly accessible without EMG guidance.    PLAN: I will have her  follow up on a PRN basis. It is anticipated that repeat injections will be required over the long term.

## 2025-01-31 ENCOUNTER — HOSPITAL ENCOUNTER (OUTPATIENT)
Dept: RESEARCH | Facility: CLINIC | Age: 75
Discharge: HOME | End: 2025-01-31
Attending: SPEECH-LANGUAGE PATHOLOGIST
Payer: COMMERCIAL

## 2025-01-31 PROCEDURE — 510N000009 HC RESEARCH FACILITY, PER 15 MIN

## 2025-01-31 NOTE — PROGRESS NOTES
This individual participated in a research visit (IRB#[id number]) as a patient with laryngeal dystonia (Primary Dysphonia: Vocal Tremor). Informed consent for participation was completed prior to today's visit. Following audio recording of stimulus sentences, lidocaine HCL 2% with Oxymetazoline HCL 0.025% was administered to the right and left nostril. A high-resolution manometry catheter was passed transasally to the proximal esophagus. Placement was confirmed via visual inspection of the real-time spatiotemporal plot of pressure data. The participant completed the tasks and the catheter was removed. No known complications occurred in association with this procedure.     Research coordinator contact: [phone].   PI contact: [phone]    Sami Mckenna, Ph.D., CCC-SLP  , Otolaryngology  Speech-Language Pathologist-[facility]  [phone]  he/him/his

## 2025-02-04 ENCOUNTER — OFFICE VISIT (OUTPATIENT)
Dept: OTOLARYNGOLOGY | Facility: CLINIC | Age: 75
End: 2025-02-04
Payer: COMMERCIAL

## 2025-02-04 VITALS — WEIGHT: 135 LBS | HEIGHT: 60 IN | BODY MASS INDEX: 26.5 KG/M2

## 2025-02-04 DIAGNOSIS — J38.3 OTHER DISEASES OF VOCAL CORDS: ICD-10-CM

## 2025-02-04 DIAGNOSIS — J38.5 LARYNGEAL SPASM: Primary | ICD-10-CM

## 2025-02-04 PROCEDURE — 64617 CHEMODENER MUSCLE LARYNX EMG: CPT | Mod: 50 | Performed by: OTOLARYNGOLOGY

## 2025-02-04 ASSESSMENT — PAIN SCALES - GENERAL: PAINLEVEL_OUTOF10: NO PAIN (0)

## 2025-02-04 NOTE — NURSING NOTE
Chief Complaint   Patient presents with    Procedure    Height 1.524 m (5'), weight 61.2 kg (135 lb). Jacquelyn Burger LPN

## 2025-02-04 NOTE — LETTER
2/4/2025       RE: Florida Nava  [address] [place] [address]       Dear Colleague,    Thank you for referring your patient, [place] Nava, to the [facility] EAR NOSE AND THROAT CLINIC [place] at [facility]. Please see a copy of my visit note below.    [place] Nava is a 74 year old female with a history of adductor laryngeal dystonia and laryngeal spasm.  she was last injected on 10/22/2024. she had a good response to the last injection. The last dose given was 0.13 units into each thyroarytenoid muscle. This was diluted to a 6.25 units/cc concentration and give 0.02 cc volume.  After the injection  she had a breathy dysphonia for 0 weeks.There was no Dysphagia or Dyspnea.  The response lasted for 2.5 months.   Our plan is to give 0.13 units of Botulinum A toxin into  each thyroarytenoid muscle today. This will be diluted to a 6.25 units/cc concentration and give 0.02 cc volume.     PROCEDURE: After obtaining consent, the patient was placed in the supine position. Ground and reference electrodes were applied. The anterior neck was cleaned with an alcohol swab.  Using a 27-gauge, unipolar electromyography needle, the larynx was entered through the cricothyroid space.  0.13 units of botulinum A toxin was injected into each thyroarytenoid muscle.  There was a Strong EMG response to phonation on the left side and a Strong EMG response to phonation on the right side.  The total amount of botulinum A toxin delivered today was 0.26 units. An additional 99.74 units of botulinum A toxin was necessarily wasted in preparation for the injection. she tolerated the procedure well and left the clinic after a short observation period.       The EMG was necessary specifically in this case to identify areas of muscle overactivity as well as to access the thyroarytenoid muscle which is beneath the thyroid cartilage and is not otherwise directly accessible without  EMG guidance.    PLAN: I will have her  follow up on a PRN basis. It is anticipated that repeat injections will be required over the long term.              Again, thank you for allowing me to participate in the care of your patient.      Sincerely,    Jose D Pugh MD

## 2025-02-04 NOTE — PROGRESS NOTES
[place] Nava is a 74 year old female with a history of adductor laryngeal dystonia and laryngeal spasm.  she was last injected on 10/22/2024. she had a good response to the last injection. The last dose given was 0.13 units into each thyroarytenoid muscle. This was diluted to a 6.25 units/cc concentration and give 0.02 cc volume.  After the injection  she had a breathy dysphonia for 0 weeks.There was no Dysphagia or Dyspnea.  The response lasted for 2.5 months.   Our plan is to give 0.13 units of Botulinum A toxin into  each thyroarytenoid muscle today. This will be diluted to a 6.25 units/cc concentration and give 0.02 cc volume.       PROCEDURE: After obtaining consent, the patient was placed in the supine position. Ground and reference electrodes were applied. The anterior neck was cleaned with an alcohol swab.  Using a 27-gauge, unipolar electromyography needle, the larynx was entered through the cricothyroid space.  0.13 units of botulinum A toxin was injected into each thyroarytenoid muscle.  There was a Strong EMG response to phonation on the left side and a Strong EMG response to phonation on the right side.  The total amount of botulinum A toxin delivered today was 0.26 units. An additional 99.74 units of botulinum A toxin was necessarily wasted in preparation for the injection. she tolerated the procedure well and left the clinic after a short observation period.         The EMG was necessary specifically in this case to identify areas of muscle overactivity as well as to access the thyroarytenoid muscle which is beneath the thyroid cartilage and is not otherwise directly accessible without EMG guidance.    PLAN: I will have her  follow up on a PRN basis. It is anticipated that repeat injections will be required over the long term.

## 2025-02-04 NOTE — PATIENT INSTRUCTIONS
1.  You were seen in the ENT Clinic today by . If you have any questions or concerns after your appointment, please call [phone]. Press option #1 for scheduling related needs. Press option #3 for Nurse advice.    2  Plan is to return to clinic 5/20/25 for repeat botox injection      Savita Loredo LPN  [phone]  [facility] - Otolaryngology

## 2025-02-28 ENCOUNTER — HOSPITAL ENCOUNTER (OUTPATIENT)
Dept: RESEARCH | Facility: CLINIC | Age: 75
Discharge: HOME | End: 2025-02-28
Attending: SPEECH-LANGUAGE PATHOLOGIST | Admitting: SPEECH-LANGUAGE PATHOLOGIST
Payer: COMMERCIAL

## 2025-02-28 PROCEDURE — 510N000009 HC RESEARCH FACILITY, PER 15 MIN

## 2025-04-04 ENCOUNTER — HOSPITAL ENCOUNTER (OUTPATIENT)
Dept: RESEARCH | Facility: CLINIC | Age: 75
Discharge: HOME | End: 2025-04-04
Attending: SPEECH-LANGUAGE PATHOLOGIST | Admitting: SPEECH-LANGUAGE PATHOLOGIST
Payer: COMMERCIAL

## 2025-04-04 PROCEDURE — 510N000009 HC RESEARCH FACILITY, PER 15 MIN

## 2025-04-04 NOTE — PROGRESS NOTES
This individual participated in a research visit (IRB#[id number]) as a participant with laryngeal dystonia. Today is her third visit. Informed consent for participation was completed prior today's visit. Following audio recording of stimulus sentences, lidocaine HCL 2% with Oxymetazoline HCL 0.025% was administered to the right and left nostril. A high-resolution manometry catheter was passed transasally to the proximal esophagus. Placement was confirmed via visual inspection of the real-time spatiotemporal plot of pressure data. The participant completed the tasks and the catheter was removed. No known complications occurred in association with this procedure.     Research coordinator contact: [phone].   PI contact: [phone]    Sami Mckenna, Ph.D., CCC-SLP  , Otolaryngology  Speech-Language Pathologist-[facility]  [phone]  he/him/his

## 2025-04-05 ENCOUNTER — HEALTH MAINTENANCE LETTER (OUTPATIENT)
Age: 75
End: 2025-04-05

## 2025-05-13 ENCOUNTER — LAB REQUISITION (OUTPATIENT)
Dept: LAB | Facility: CLINIC | Age: 75
End: 2025-05-13
Payer: COMMERCIAL

## 2025-05-13 DIAGNOSIS — E78.5 HYPERLIPIDEMIA, UNSPECIFIED: ICD-10-CM

## 2025-05-13 DIAGNOSIS — I48.91 UNSPECIFIED ATRIAL FIBRILLATION (H): ICD-10-CM

## 2025-05-13 DIAGNOSIS — E04.1 NONTOXIC SINGLE THYROID NODULE: ICD-10-CM

## 2025-05-13 DIAGNOSIS — M85.80 OTHER SPECIFIED DISORDERS OF BONE DENSITY AND STRUCTURE, UNSPECIFIED SITE: ICD-10-CM

## 2025-05-13 DIAGNOSIS — Z13.1 ENCOUNTER FOR SCREENING FOR DIABETES MELLITUS: ICD-10-CM

## 2025-05-13 LAB
ANION GAP SERPL CALCULATED.3IONS-SCNC: 13 MMOL/L (ref 7–15)
BUN SERPL-MCNC: 17 MG/DL (ref 8–23)
CALCIUM SERPL-MCNC: 9.3 MG/DL (ref 8.8–10.4)
CHLORIDE SERPL-SCNC: 103 MMOL/L (ref 98–107)
CHOLEST SERPL-MCNC: 238 MG/DL
CREAT SERPL-MCNC: 0.86 MG/DL (ref 0.51–0.95)
EGFRCR SERPLBLD CKD-EPI 2021: 70 ML/MIN/1.73M2
FASTING STATUS PATIENT QL REPORTED: YES
GLUCOSE SERPL-MCNC: 100 MG/DL (ref 70–99)
HCO3 SERPL-SCNC: 23 MMOL/L (ref 22–29)
HDLC SERPL-MCNC: 56 MG/DL
LDLC SERPL CALC-MCNC: 148 MG/DL
NONHDLC SERPL-MCNC: 182 MG/DL
POTASSIUM SERPL-SCNC: 4.4 MMOL/L (ref 3.4–5.3)
SODIUM SERPL-SCNC: 139 MMOL/L (ref 135–145)
TRIGL SERPL-MCNC: 171 MG/DL
TSH SERPL DL<=0.005 MIU/L-ACNC: 0.81 UIU/ML (ref 0.3–4.2)
VIT D+METAB SERPL-MCNC: 35 NG/ML (ref 20–50)

## 2025-05-16 ENCOUNTER — HOSPITAL ENCOUNTER (OUTPATIENT)
Dept: RESEARCH | Facility: CLINIC | Age: 75
Discharge: HOME | End: 2025-05-16
Attending: SPEECH-LANGUAGE PATHOLOGIST | Admitting: SPEECH-LANGUAGE PATHOLOGIST
Payer: COMMERCIAL

## 2025-05-16 PROCEDURE — 510N000009 HC RESEARCH FACILITY, PER 15 MIN

## 2025-05-16 NOTE — PROGRESS NOTES
This individual participated in a research visit (IRB#[id number]) as a participant with laryngeal dystonia and tremor. Informed consent for participation was completed prior to today's visit. Today was her fourth visit. Following audio recording of stimulus sentences, lidocaine HCL 2% with Oxymetazoline HCL 0.025% was administered to the right and left nostril. A high-resolution manometry catheter was passed transasally to the proximal esophagus. Placement was confirmed via visual inspection of the real-time spatiotemporal plot of pressure data. The participant completed the tasks and the catheter was removed. No known complications occurred in association with this procedure.     Research coordinator contact: [phone].   PI contact: [phone]    Sami Mckenna, Ph.D., CCC-SLP  , Otolaryngology  Speech-Language Pathologist-[facility]  [phone]  he/him/his

## 2025-05-20 ENCOUNTER — OFFICE VISIT (OUTPATIENT)
Dept: OTOLARYNGOLOGY | Facility: CLINIC | Age: 75
End: 2025-05-20
Payer: COMMERCIAL

## 2025-05-20 DIAGNOSIS — J38.5 LARYNGEAL SPASM: Primary | ICD-10-CM

## 2025-05-20 DIAGNOSIS — J38.3 OTHER DISEASES OF VOCAL CORDS: ICD-10-CM

## 2025-05-20 PROCEDURE — 1126F AMNT PAIN NOTED NONE PRSNT: CPT | Performed by: OTOLARYNGOLOGY

## 2025-05-20 PROCEDURE — 64617 CHEMODENER MUSCLE LARYNX EMG: CPT | Mod: 50 | Performed by: OTOLARYNGOLOGY

## 2025-05-20 ASSESSMENT — PAIN SCALES - GENERAL: PAINLEVEL_OUTOF10: NO PAIN (0)

## 2025-05-20 NOTE — LETTER
5/20/2025       RE: Florida Nava  [address] [place] [address]     Dear Colleague,    Thank you for referring your patient, [place] Nava, to the [facility] EAR NOSE AND THROAT CLINIC [place] at [facility]. Please see a copy of my visit note below.    [place] Nava is a 74 year old female with a history of adductor laryngeal dystonia and laryngeal spasm.  she was last injected on 2/4/2024. she had a good response to the last injection. The last dose given was 0.13 units into each thyroarytenoid muscle.  This was diluted to a 6.25 units/cc concentration and 0.02 cc volume given.  After the injection  she had a breathy dysphonia for 0.5 weeks.There was no Dysphagia or Dyspnea.  The response lasted for 3 months.   Our plan is to give 0.13 units of Botulinum A toxin into  each thyroarytenoid muscle today.This will be diluted to a 6.25 units/cc concentration and give 0.02 cc volume.       PROCEDURE: After obtaining consent, the patient was placed in the supine position. Ground and reference electrodes were applied. The anterior neck was cleaned with an alcohol swab.  Using a 27-gauge, unipolar electromyography needle, the larynx was entered through the cricothyroid space.  0.13 units of botulinum A toxin was injected into each thyroarytenoid muscle.  There was a Moderate EMG response to phonation on the left side and a Strong EMG response to phonation on the right side.  The total amount of botulinum A toxin delivered today was 0.26 units. An additional 99.74 units of botulinum A toxin was necessarily wasted in preparation for the injection. she tolerated the procedure well and left the clinic after a short observation period.         The EMG was necessary specifically in this case to identify areas of muscle overactivity as well as to access the thyroarytenoid muscle which is beneath the thyroid cartilage and is not otherwise directly accessible  without EMG guidance.    PLAN: I will have her  follow up on a PRN basis. It is anticipated that repeat injections will be required over the long term.        Again, thank you for allowing me to participate in the care of your patient.      Sincerely,    Jose D Pugh MD

## 2025-05-20 NOTE — PATIENT INSTRUCTIONS
1.  You were seen in the ENT Clinic today by . If you have any questions or concerns after your appointment, please call [phone]. Press option #1 for scheduling related needs. Press option #3 for Nurse advice.    2.   has recommended the following:   - follow up with  for next injection    3.  Plan is to return to clinic 8/18/25      Savita Loredo LPN  [phone]  [facility] - Otolaryngology

## 2025-05-20 NOTE — PROGRESS NOTES
[place] Nava is a 74 year old female with a history of adductor laryngeal dystonia and laryngeal spasm.  she was last injected on 2/4/2024. she had a good response to the last injection. The last dose given was 0.13 units into each thyroarytenoid muscle.  This was diluted to a 6.25 units/cc concentration and 0.02 cc volume given.  After the injection  she had a breathy dysphonia for 0.5 weeks.There was no Dysphagia or Dyspnea.  The response lasted for 3 months.   Our plan is to give 0.13 units of Botulinum A toxin into  each thyroarytenoid muscle today.This will be diluted to a 6.25 units/cc concentration and give 0.02 cc volume.       PROCEDURE: After obtaining consent, the patient was placed in the supine position. Ground and reference electrodes were applied. The anterior neck was cleaned with an alcohol swab.  Using a 27-gauge, unipolar electromyography needle, the larynx was entered through the cricothyroid space.  0.13 units of botulinum A toxin was injected into each thyroarytenoid muscle.  There was a Moderate EMG response to phonation on the left side and a Strong EMG response to phonation on the right side.  The total amount of botulinum A toxin delivered today was 0.26 units. An additional 99.74 units of botulinum A toxin was necessarily wasted in preparation for the injection. she tolerated the procedure well and left the clinic after a short observation period.         The EMG was necessary specifically in this case to identify areas of muscle overactivity as well as to access the thyroarytenoid muscle which is beneath the thyroid cartilage and is not otherwise directly accessible without EMG guidance.    PLAN: I will have her  follow up on a PRN basis. It is anticipated that repeat injections will be required over the long term.

## 2025-06-04 ENCOUNTER — TRANSCRIBE ORDERS (OUTPATIENT)
Dept: OTHER | Age: 75
End: 2025-06-04

## 2025-06-04 DIAGNOSIS — G25.0 ESSENTIAL TREMOR: Primary | ICD-10-CM

## 2025-06-05 ENCOUNTER — PATIENT OUTREACH (OUTPATIENT)
Dept: CARE COORDINATION | Facility: CLINIC | Age: 75
End: 2025-06-05
Payer: COMMERCIAL

## 2025-06-09 ENCOUNTER — PATIENT OUTREACH (OUTPATIENT)
Dept: CARE COORDINATION | Facility: CLINIC | Age: 75
End: 2025-06-09
Payer: COMMERCIAL

## 2025-07-19 ENCOUNTER — HEALTH MAINTENANCE LETTER (OUTPATIENT)
Age: 75
End: 2025-07-19

## 2025-08-18 ENCOUNTER — OFFICE VISIT (OUTPATIENT)
Dept: OTOLARYNGOLOGY | Facility: CLINIC | Age: 75
End: 2025-08-18
Payer: COMMERCIAL

## 2025-08-18 VITALS — BODY MASS INDEX: 24.59 KG/M2 | HEIGHT: 64 IN | WEIGHT: 144 LBS

## 2025-08-18 DIAGNOSIS — R49.0 DYSPHONIA: ICD-10-CM

## 2025-08-18 DIAGNOSIS — J38.5 LARYNGEAL SPASM: ICD-10-CM

## 2025-08-18 DIAGNOSIS — G24.9 DYSTONIA: Primary | ICD-10-CM
